# Patient Record
Sex: FEMALE | Race: BLACK OR AFRICAN AMERICAN | NOT HISPANIC OR LATINO | Employment: FULL TIME | URBAN - METROPOLITAN AREA
[De-identification: names, ages, dates, MRNs, and addresses within clinical notes are randomized per-mention and may not be internally consistent; named-entity substitution may affect disease eponyms.]

---

## 2017-01-06 ENCOUNTER — OFFICE VISIT (OUTPATIENT)
Dept: OBSTETRICS AND GYNECOLOGY | Facility: CLINIC | Age: 42
End: 2017-01-06
Payer: COMMERCIAL

## 2017-01-06 VITALS
SYSTOLIC BLOOD PRESSURE: 142 MMHG | DIASTOLIC BLOOD PRESSURE: 90 MMHG | BODY MASS INDEX: 36.67 KG/M2 | WEIGHT: 228.19 LBS | HEIGHT: 66 IN

## 2017-01-06 DIAGNOSIS — D50.0 IRON DEFICIENCY ANEMIA DUE TO CHRONIC BLOOD LOSS: ICD-10-CM

## 2017-01-06 DIAGNOSIS — Z30.09 STERILIZATION CONSULT: ICD-10-CM

## 2017-01-06 DIAGNOSIS — N92.0 MENORRHAGIA WITH REGULAR CYCLE: Primary | ICD-10-CM

## 2017-01-06 PROCEDURE — 99999 PR PBB SHADOW E&M-EST. PATIENT-LVL III: CPT | Mod: PBBFAC,,, | Performed by: OBSTETRICS & GYNECOLOGY

## 2017-01-06 PROCEDURE — 3077F SYST BP >= 140 MM HG: CPT | Mod: S$GLB,,, | Performed by: OBSTETRICS & GYNECOLOGY

## 2017-01-06 PROCEDURE — 3080F DIAST BP >= 90 MM HG: CPT | Mod: S$GLB,,, | Performed by: OBSTETRICS & GYNECOLOGY

## 2017-01-06 PROCEDURE — 88305 TISSUE EXAM BY PATHOLOGIST: CPT | Mod: 26,,, | Performed by: PATHOLOGY

## 2017-01-06 PROCEDURE — 1159F MED LIST DOCD IN RCRD: CPT | Mod: S$GLB,,, | Performed by: OBSTETRICS & GYNECOLOGY

## 2017-01-06 PROCEDURE — 88305 TISSUE EXAM BY PATHOLOGIST: CPT | Performed by: PATHOLOGY

## 2017-01-06 PROCEDURE — 99213 OFFICE O/P EST LOW 20 MIN: CPT | Mod: 25,S$GLB,, | Performed by: OBSTETRICS & GYNECOLOGY

## 2017-01-06 PROCEDURE — 58100 BIOPSY OF UTERUS LINING: CPT | Mod: S$GLB,,, | Performed by: OBSTETRICS & GYNECOLOGY

## 2017-01-06 NOTE — MR AVS SNAPSHOT
[facility]  [address]  [place] [address]  Phone: [phone]  Fax: [phone]                  Adeel Gordon   2017 2:30 PM   Office Visit    Description:  Female : 1975   Provider:  Gloria Martínez MD   Department:  [facility]           Reason for Visit     Sterilization           Diagnoses this Visit        Comments    Menorrhagia with regular cycle    -  Primary     Iron deficiency anemia due to chronic blood loss                To Do List           Future Appointments        Provider Department Dept Phone    2017 2:00 PM ONLH US1 [facility] [phone]      Goals (5 Years of Data)     None      Follow-Up and Disposition     Release results to MyChart [facility]     [facility] Nurse Care Line -  Assistance  Registered nurses in the [facility] provide clinical advisement, health education, appointment booking, and other advisory services.  Call for this free service at [phone].             Medications           Message regarding Medications     Verify the changes and/or additions to your medication regime listed below are the same as discussed with your clinician today.  If any of these changes or additions are incorrect, please notify your healthcare provider.             Verify that the below list of medications is an accurate representation of the medications you are currently taking.  If none reported, the list may be blank. If incorrect, please contact your healthcare provider. Carry this list with you in case of emergency.           Current Medications     amlodipine-benazepril 5-10 mg (LOTREL) 5-10 mg per capsule Take 1 capsule by mouth once daily.    ferrous sulfate 325 mg (65 mg iron) Tab tablet Take 1 tablet (325 mg total) by mouth 2 (two) times daily.           Clinical Reference Information           Vital Signs - Last Recorded  Most recent update: 2017  2:31 PM by  "Aleksey Howell MA    BP Ht Wt LMP BMI    (!) 142/90 5' 6" (1.676 m) 103.5 kg (228 lb 2.8 oz) 01/02/2017 (Exact Date) 36.83 kg/m2      Blood Pressure          Most Recent Value    BP  (!)  142/90      Allergies as of 1/6/2017     No Known Allergies      Immunizations Administered on Date of Encounter - 1/6/2017     None      Orders Placed During Today's Visit      Normal Orders This Visit    Tissue Specimen To Pathology, Obstetrics/Gynecology     Future Labs/Procedures Expected by Expires    US Pelvis Complete Non OB  1/6/2017 1/6/2018      Smoking Cessation     If you would like to quit smoking:   You may be eligible for free services if you are a [place] resident and started smoking cigarettes before September 1, 1988.  Call the Smoking Cessation Trust (SCT) toll free at (684) 313-9892 or (060) [phone].   Call 0-523-QUIT-NOW if you do not meet the above criteria.            "

## 2017-01-06 NOTE — PROGRESS NOTES
HPI:  41 y.o. female  presents today with complaint of longstanding menorrhagia.  She has regular menses each month that are very heavy, with clots and flooding.  She has to change protection every 2-3 hours on heavy days.  She also has anemia, and feels weak and run down.  No pain.  She also desires sterilization for contraception and is sure she does not desire any further pregnancies.  No other complaints.  Recently had annual exam with Lorie Olvera.      REVIEW OF SYSTEMS:  GENERAL:  No fever, chills, or weight loss  ABDOMEN:  Normal appetite, no weight loss or abdominal pain  URINARY:  No flank pain, dysuria, or hematuria  REPRODUCTIVE:  No abnormal vaginal discharge  BREASTS:  No tenderness, masses, or nipple discharge noted of breasts    PHYSICAL EXAM:    APPEARANCE:  Well nourished, well developed, in no acute distress  ABDOMEN:  Soft, no tenderness or masses, no distension noted  PELVIC:  VULVA:  No lesions.  Normal female genitalia  URETHRAL MEATUS:  Normal size and location.  No lesions.  No prolapse  URETHRA:  No masses, tenderness, prolapse, or scarring  VAGINA:  No lesions or discharge.  No significant cystocoele or rectocoele  CERVIX:  No lesions.  Normal diameter, no cervical motion tenderness.  UTERUS:  10 week size, regular shape, mobile, non-tender, normal position, good support  ADNEXA:  No masses or tenderness  ANUS AND PERINEUM:  No lesions.  No external hemorrhoids      PROCEDURE NOTE:  ENDOMETRIAL BIOPSPY    PRE ENDOMETRIAL BIOPSY COUNSELING:  The patient was informed of the risk of bleeding, infection, uterine perforation and pain and that the test will rule-out endometrial cancer with accuracy greater than 95%. She was counseled on the alternatives to endometrial biopsy and agrees to proceed.    TIME OUT PERFORMED.  The cervix was visualized with a speculum.  A single tooth tenaculum was placed on the anterior lip of the cervix prior to the biopsy.  A sterile endometrial pipelle was  passed without difficulty to a depth of 8 cm.  Moderate amount of endometrial tissue was obtained.    The specimen was placed in formalyn and sent to Pathology for histology evaluation. The patient tolerated the procedure well    POST ENDOMETRIAL BIOPSY COUNSELING:  Manage post biopsy cramping with NSAIDs or Tylenol.  Expect spotting or light bleeding for a few days.  Report bleeding heavier than a period, fever > 101.0 F, worsening pain or a foul smelling vaginal discharge.    Counseling lasted approximately 15 minutes and all her questions were answered    ASSESSMENT:  1. Menorrhagia with regular cycle  US Pelvis Complete Non OB    Tissue Specimen To Pathology, Obstetrics/Gynecology   2. Iron deficiency anemia due to chronic blood loss           PLAN:  Patient counseled regarding treatment options for her situation.  She desires to proceed with Novasure ablation and Lap BTL.  Will proceed with scheduling this, as long as uterine size on ultrasound is within required parameters.    Discussed with the patient and all questioned fully answered. She will call me if any problems arise.    The patient indicates understanding of these issues and agrees with the plan.

## 2017-01-11 ENCOUNTER — TELEPHONE (OUTPATIENT)
Dept: RADIOLOGY | Facility: HOSPITAL | Age: 42
End: 2017-01-11

## 2017-01-12 ENCOUNTER — HOSPITAL ENCOUNTER (OUTPATIENT)
Dept: RADIOLOGY | Facility: HOSPITAL | Age: 42
Discharge: HOME | End: 2017-01-12
Attending: OBSTETRICS & GYNECOLOGY
Payer: COMMERCIAL

## 2017-01-12 DIAGNOSIS — N92.0 MENORRHAGIA WITH REGULAR CYCLE: ICD-10-CM

## 2017-01-12 PROCEDURE — 76856 US EXAM PELVIC COMPLETE: CPT | Mod: 26,,, | Performed by: RADIOLOGY

## 2017-01-12 PROCEDURE — 76856 US EXAM PELVIC COMPLETE: CPT | Mod: TC

## 2017-01-13 ENCOUNTER — TELEPHONE (OUTPATIENT)
Dept: OBSTETRICS AND GYNECOLOGY | Facility: CLINIC | Age: 42
End: 2017-01-13

## 2017-01-13 NOTE — TELEPHONE ENCOUNTER
Please notify patient that her EMB was negative and pelvic ultrasound showed small fibroids.  I would recommend that she proceed with Novasure ablation and Lap BTL, as we discussed.  Please schedule.

## 2017-01-18 NOTE — TELEPHONE ENCOUNTER
Patient notified of negative EMB and US results.  Patient states she will call back when she is ready to schedule her Novasure Ablation and Lap BTL.  She is waiting for a day off from work to be scheduled.

## 2017-01-25 ENCOUNTER — TELEPHONE (OUTPATIENT)
Dept: OBSTETRICS AND GYNECOLOGY | Facility: CLINIC | Age: 42
End: 2017-01-25

## 2017-01-25 DIAGNOSIS — N92.0 MENORRHAGIA WITH REGULAR CYCLE: Primary | ICD-10-CM

## 2017-01-25 DIAGNOSIS — Z30.2 ENCOUNTER FOR FEMALE STERILIZATION PROCEDURE: ICD-10-CM

## 2017-02-07 ENCOUNTER — TELEPHONE (OUTPATIENT)
Dept: OBSTETRICS AND GYNECOLOGY | Facility: CLINIC | Age: 42
End: 2017-02-07

## 2017-02-07 NOTE — TELEPHONE ENCOUNTER
Patient would like to talk to you, she is scheduled for her pre op on 02/23/2017 at 200pm,  and she would like a TL also.  tf

## 2017-02-07 NOTE — TELEPHONE ENCOUNTER
----- Message from Margaux Gee sent at 1/25/2017  8:19 AM CST -----  Contact: pt  Pt calling to speak to Angely in regards to scheduling surgery for tubal ligation....please adv/call pt back at [phone]///thx jw

## 2017-02-10 ENCOUNTER — CLINICAL SUPPORT (OUTPATIENT)
Dept: CARDIOLOGY | Facility: CLINIC | Age: 42
End: 2017-02-10
Payer: COMMERCIAL

## 2017-02-10 ENCOUNTER — HOSPITAL ENCOUNTER (OUTPATIENT)
Dept: PREADMISSION TESTING | Facility: HOSPITAL | Age: 42
Discharge: HOME | End: 2017-02-10
Attending: OBSTETRICS & GYNECOLOGY
Payer: COMMERCIAL

## 2017-02-10 ENCOUNTER — OFFICE VISIT (OUTPATIENT)
Dept: OBSTETRICS AND GYNECOLOGY | Facility: CLINIC | Age: 42
End: 2017-02-10
Payer: COMMERCIAL

## 2017-02-10 VITALS
DIASTOLIC BLOOD PRESSURE: 88 MMHG | WEIGHT: 221.56 LBS | BODY MASS INDEX: 35.61 KG/M2 | SYSTOLIC BLOOD PRESSURE: 130 MMHG | HEIGHT: 66 IN

## 2017-02-10 VITALS — BODY MASS INDEX: 35.36 KG/M2 | HEIGHT: 66 IN | WEIGHT: 220 LBS

## 2017-02-10 DIAGNOSIS — Z01.818 PREOP EXAMINATION: Primary | ICD-10-CM

## 2017-02-10 DIAGNOSIS — Z01.818 PRE-OP TESTING: ICD-10-CM

## 2017-02-10 DIAGNOSIS — Z01.818 PRE-OP TESTING: Primary | ICD-10-CM

## 2017-02-10 PROCEDURE — 93000 ELECTROCARDIOGRAM COMPLETE: CPT | Mod: S$GLB,,, | Performed by: NUCLEAR MEDICINE

## 2017-02-10 PROCEDURE — 99499 UNLISTED E&M SERVICE: CPT | Mod: S$GLB,,, | Performed by: OBSTETRICS & GYNECOLOGY

## 2017-02-10 PROCEDURE — 99999 PR PBB SHADOW E&M-EST. PATIENT-LVL II: CPT | Mod: PBBFAC,,, | Performed by: OBSTETRICS & GYNECOLOGY

## 2017-02-10 NOTE — PROGRESS NOTES
Patient presents today for preoperative visit.  She is scheduled for D&C, hysteroscopy, lap BTL, and Novasure ablation on 2/28/17.  Procedure was explained to the patient and procedure specific consent form reviewed with the patient and signed by her.  All questions were answered to the patient's satisfaction.  Patient seems to understand the procedure, as well as risks and benefits associated with it.  Will plan to proceed with surgery as planned.

## 2017-02-10 NOTE — DISCHARGE INSTRUCTIONS
Pre op instructions reviewed with patient per phone:    To confirm, Your surgeon has instructed you:  Surgery is scheduled 2/28/17 at 1:00pm.      Please report to [facility] 1st floor main lobby by 11:30am Pre admit nurse will call day prior to verify arrival time.      INSTRUCTIONS IMPORTANT!!!  ¨ Do not eat, drink, or smoke after 12 midnight. You may have water or apple juice until 3 hours prior to surgery. OK to brush teeth, no gum, candy or mints!    ¨ Take only these medicines with a small swallow of water-morning of surgery.  Lotrel    ____  Do not wear makeup, including mascara.  ____  No powder, lotions or creams to surgical area.  ____  Please remove all jewelry, including piercings and leave at home.  ____  No money or valuables needed. Please leave at home.  ____  Please bring identification and insurance information to hospital.  ____  If going home the same day, arrange for a ride home. You will not be able to   drive if Anesthesia was used.  ____  Children, under 12 years old, must remain in the waiting room with an adult.  They are not allowed in patient areas.  ____  Wear loose fitting clothing. Allow for dressings, bandages.  ____  Stop Aspirin, Ibuprofen, Motrin and Aleve at least 5-7 days before surgery, unless otherwise instructed by your doctor, or the nurse.   You MAY use Tylenol/acetaminophen until day of surgery.  ____  If you take diabetic medication, do not take am of surgery unless instructed by   Doctor.  ____ Stop taking any Fish Oil supplement or any Vitamins that contain Vitamin E at least 5 days prior to surgery.          Bathing Instructions-- The night before surgery and the morning prior to coming to the hospital:   -Do not shave the surgical area.   -Shower and wash your hair and body as usual with your regular soap and shampoo.   -Rinse your hair and body completely.   -Use one packet of hibiclens to wash the surgical site (using your hand) gently for 5  minutes.  Do not scrub you skin too hard.   -Do not use hibiclens on your head, face, or genitals.   -Do not wash with regular soap after you use the hibiclens.   -Rinse your body thoroughly.   -Dry with clean, soft towel.  Do not use lotion, cream, deodorant, or powders on   the surgical site.    Use antibacterial soap in place of hibiclens if your surgery is on the head, face or genitals.         Surgical Site Infection    Prevention of surgical site infections:     -Keep incisions clean and dry.   -Do not soak/submerge incisions in water until completely healed.   -Do not apply lotions, powders, creams, or deodorants to site.   -Always make sure hands are cleaned with antibacterial soap/ alcohol-based   prior to touching the surgical site.  (This includes doctors, nurses, staff, and yourself.)    Signs and symptoms:   -Redness and pain around the area where you had surgery   -Drainage of cloudy fluid from your surgical wound   -Fever over 100.4  I have read or had read and explained to me, and understand the above information.

## 2017-02-10 NOTE — IP AVS SNAPSHOT
[facility]  [id number] [place] [address] [address] [place] [address]           Patient Discharge Instructions    Our goal is to set you up for success. This packet includes information on your condition, medications, and your home care. It will help you to care for yourself so you don't get sicker.     Please ask your nurse if you have any questions.        There are many details to remember when preparing for your surgery. Here is what you will need to do, please ask your nurse if there are more specific instructions and if you have any questions:    1. 24 hours before procedure Do not smoke or drink alcoholic beverages 24 hours prior to your procedure    2. Eating before procedure Do not eat or drink anything 8 hours before your procedure - this includes gum, mints, and candy.     3. Day of procedure Please remove all jewelry for the procedure. If you wear contact lenses, dentures, hearing aids or glasses, bring a container to put them in during your surgery and give to a family member for safekeeping.  If your doctor has scheduled you for an overnight stay, bring a small overnight bag with any personal items that you need.    4. After procedure Make arrangements in advance for transportation home by a responsible adult. It is not safe to drive a vehicle during the 24 hours following surgery.     PLEASE NOTE: You may be contacted the day before your surgery to confirm your surgery date and arrival time. The Surgery schedule has many variables which may affect the time of your surgery case. Family members should be available if your surgery time changes.                [facility]  Unless otherwise directed by your provider, please contact [facility], our nurse care line that is available for 24/7 assistance.     [phone] (toll-free)    Registered nurses in the [facility] provide clinical advisement, health education, appointment booking, and other advisory services.                     ** Verify the list of medication(s) below is accurate and up to date. Carry this with you in case of emergency. If your medications have changed, please notify your healthcare provider.             Medication List      TAKE these medications        Additional Info                      amlodipine-benazepril 5-10 mg 5-10 mg per capsule   Commonly known as:  LOTREL   Quantity:  30 capsule   Refills:  6   Dose:  1 capsule    Instructions:  Take 1 capsule by mouth once daily.     Begin Date    AM    Noon    PM    Bedtime       ferrous sulfate 325 mg (65 mg iron) Tab tablet   Quantity:  60 tablet   Refills:  3   Dose:  325 mg    Instructions:  Take 1 tablet (325 mg total) by mouth 2 (two) times daily.     Begin Date    AM    Noon    PM    Bedtime                  Please bring to all follow up appointments:    1. A copy of your discharge instructions.  2. All medicines you are currently taking in their original bottles.  3. Identification and insurance card.    Please arrive 15 minutes ahead of scheduled appointment time.    Please call 24 hours in advance if you must reschedule your appointment and/or time.        Your Scheduled Appointments     Feb 10, 2017  3:20 PM CST   Non-Fasting Lab with LABORATORY, [address] [facility] ([address] [id number] [phone]            Mar 30, 2017  1:30 PM CDT   Post OP with Gloria Martínez MD   [facility] ([address] [id number] [phone]              Your Future Surgeries/Procedures     Feb 28, 2017   Surgery with Gloria Martínez MD   [facility])    [id number] [place] [id number] [phone]                  Discharge Instructions       Pre op instructions reviewed with patient per phone:    To confirm, Your surgeon has instructed you:  Surgery is scheduled 2/28/17 at  1:00pm.      Please report to [facility] 1st floor main lobby by 11:30am Pre admit nurse will call day prior to verify arrival time.      INSTRUCTIONS IMPORTANT!!!  ¨ Do not eat, drink, or smoke after 12 midnight. You may have water or apple juice until 3 hours prior to surgery. OK to brush teeth, no gum, candy or mints!    ¨ Take only these medicines with a small swallow of water-morning of surgery.  Lotrel    ____  Do not wear makeup, including mascara.  ____  No powder, lotions or creams to surgical area.  ____  Please remove all jewelry, including piercings and leave at home.  ____  No money or valuables needed. Please leave at home.  ____  Please bring identification and insurance information to hospital.  ____  If going home the same day, arrange for a ride home. You will not be able to   drive if Anesthesia was used.  ____  Children, under 12 years old, must remain in the waiting room with an adult.  They are not allowed in patient areas.  ____  Wear loose fitting clothing. Allow for dressings, bandages.  ____  Stop Aspirin, Ibuprofen, Motrin and Aleve at least 5-7 days before surgery, unless otherwise instructed by your doctor, or the nurse.   You MAY use Tylenol/acetaminophen until day of surgery.  ____  If you take diabetic medication, do not take am of surgery unless instructed by   Doctor.  ____ Stop taking any Fish Oil supplement or any Vitamins that contain Vitamin E at least 5 days prior to surgery.          Bathing Instructions-- The night before surgery and the morning prior to coming to the hospital:   -Do not shave the surgical area.   -Shower and wash your hair and body as usual with your regular soap and shampoo.   -Rinse your hair and body completely.   -Use one packet of hibiclens to wash the surgical site (using your hand) gently for 5 minutes.  Do not scrub you skin too hard.   -Do not use hibiclens on your head, face, or genitals.   -Do not wash with regular soap  "after you use the hibiclens.   -Rinse your body thoroughly.   -Dry with clean, soft towel.  Do not use lotion, cream, deodorant, or powders on   the surgical site.    Use antibacterial soap in place of hibiclens if your surgery is on the head, face or genitals.         Surgical Site Infection    Prevention of surgical site infections:     -Keep incisions clean and dry.   -Do not soak/submerge incisions in water until completely healed.   -Do not apply lotions, powders, creams, or deodorants to site.   -Always make sure hands are cleaned with antibacterial soap/ alcohol-based   prior to touching the surgical site.  (This includes doctors, nurses, staff, and yourself.)    Signs and symptoms:   -Redness and pain around the area where you had surgery   -Drainage of cloudy fluid from your surgical wound   -Fever over 100.4  I have read or had read and explained to me, and understand the above information.          Discharge References/Attachments     ABLATION, ENDOMETRIAL (ENGLISH)    TUBAL STERILIZATION PROCEDURE, YOUR LAPAROSCOPIC (ENGLISH)    ANESTHESIA: GENERAL ANESTHESIA (ENGLISH)    POSTSURGICAL DEEP BREATHING, DISCHARGE INSTRUCTIONS (ENGLISH)        Admission Information     Date & Time Provider Department CSN    2/10/2017  2:30 PM Gloria Martínez MD [facility] [id number]      Care Providers     Provider Role Specialty Primary office phone    Gloria Martínez MD Attending Provider Obstetrics [phone]      Your Vitals Were     Height Weight Last Period BMI       5' 6" (1.676 m) 99.8 kg (220 lb) 01/29/2017 (Exact Date) 35.51 kg/m2       Recent Lab Values     No lab values to display.      Allergies as of 2/10/2017     No Known Allergies      Advance Directives     An advance directive is a document which, in the event you are no longer able to make decisions for yourself, tells your healthcare team what kind of treatment you do or do not want to receive, or who you would " like to make those decisions for you.  If you do not currently have an advance directive, [facility] encourages you to create one.  For more information call:  [phone]), [phone] ([phone]),  or log on to [email or web address].        Smoking Cessation     If you would like to quit smoking:   You may be eligible for free services if you are a [place] resident and started smoking cigarettes before September 1, 1988.  Call the Smoking Cessation Trust (SCT) toll free at (518) 238-1023 or (024) 340-2739.   Call 8-592-QUIT-NOW if you do not meet the above criteria.            Language Assistance Services     ATTENTION: Language assistance services are available, free of charge. Please call [phone].      ATENCIÓN: Si habla jaqueline, tiene a toscano disposición servicios gratuitos de asistencia lingüística. Llame al [phone].     CHÚ Ý: N?u b?n nói Ti?ng Vi?t, có các d?ch v? h? tr? ngôn ng? mi?n phí dành cho b?n. G?i s? [phone].         [facility] complies with applicable Federal civil rights laws and does not discriminate on the basis of race, color, national origin, age, disability, or sex.

## 2017-02-21 ENCOUNTER — TELEPHONE (OUTPATIENT)
Dept: OBSTETRICS AND GYNECOLOGY | Facility: CLINIC | Age: 42
End: 2017-02-21

## 2017-02-21 NOTE — TELEPHONE ENCOUNTER
----- Message from Camille Rojas sent at 2/21/2017  1:17 PM CST -----  Contact: pt  Pt requests nurse to call her at [phone] (xaeu) regarding a letter for surgery for her employer.

## 2017-02-23 ENCOUNTER — PATIENT MESSAGE (OUTPATIENT)
Dept: OBSTETRICS AND GYNECOLOGY | Facility: CLINIC | Age: 42
End: 2017-02-23

## 2017-02-27 ENCOUNTER — TELEPHONE (OUTPATIENT)
Dept: OBSTETRICS AND GYNECOLOGY | Facility: CLINIC | Age: 42
End: 2017-02-27

## 2017-02-27 ENCOUNTER — ANESTHESIA EVENT (OUTPATIENT)
Dept: SURGERY | Facility: HOSPITAL | Age: 42
End: 2017-02-27

## 2017-02-27 NOTE — TELEPHONE ENCOUNTER
----- Message from Nithya Hanley sent at 2/27/2017  9:33 AM CST -----  Patient is scheduled for surgery tomorrow.  She said she left messages all last week and no one called her back.  She needs a letter for her employer stating that the surgery she is having is necessary or she will not be able to take off.  She would like for you to fax it to her at [phone].  Call her at [phone].                                                  stubbs

## 2017-02-27 NOTE — TELEPHONE ENCOUNTER
Spoke to patient and let her know that I had attempted to reach her several times last week leaving messages and that I sent a patient portal message as well.  Patient stated that she needs a letter sent to her employer stating that she is having surgery tomorrow and type of surgery, that it is medically necessary and that she will need time off for recouping.  I told patient this message would be forwarded to Dr. Martínez and that I would fax it today as soon as possible.

## 2017-02-28 ENCOUNTER — SURGERY (OUTPATIENT)
Age: 42
End: 2017-02-28

## 2017-02-28 ENCOUNTER — ANESTHESIA (OUTPATIENT)
Dept: SURGERY | Facility: HOSPITAL | Age: 42
End: 2017-02-28

## 2017-02-28 ENCOUNTER — HOSPITAL ENCOUNTER (OUTPATIENT)
Facility: HOSPITAL | Age: 42
Discharge: HOME | End: 2017-02-28
Attending: OBSTETRICS & GYNECOLOGY | Admitting: OBSTETRICS & GYNECOLOGY

## 2017-02-28 VITALS
WEIGHT: 220 LBS | SYSTOLIC BLOOD PRESSURE: 137 MMHG | RESPIRATION RATE: 23 BRPM | BODY MASS INDEX: 35.36 KG/M2 | HEART RATE: 83 BPM | HEIGHT: 66 IN | TEMPERATURE: 98 F | OXYGEN SATURATION: 96 % | DIASTOLIC BLOOD PRESSURE: 90 MMHG

## 2017-02-28 DIAGNOSIS — N92.0 MENORRHAGIA WITH REGULAR CYCLE: Primary | ICD-10-CM

## 2017-02-28 DIAGNOSIS — D50.0 IRON DEFICIENCY ANEMIA DUE TO CHRONIC BLOOD LOSS: ICD-10-CM

## 2017-02-28 DIAGNOSIS — N92.0 MENORRHAGIA: ICD-10-CM

## 2017-02-28 DIAGNOSIS — G89.18 POST-OP PAIN: ICD-10-CM

## 2017-02-28 LAB
B-HCG UR QL: NEGATIVE
CTP QC/QA: YES

## 2017-02-28 PROCEDURE — 37000009 HC ANESTHESIA EA ADD 15 MINS: Performed by: OBSTETRICS & GYNECOLOGY

## 2017-02-28 PROCEDURE — 27201423 OPTIME MED/SURG SUP & DEVICES STERILE SUPPLY: Performed by: OBSTETRICS & GYNECOLOGY

## 2017-02-28 PROCEDURE — 36000709 HC OR TIME LEV III EA ADD 15 MIN: Performed by: OBSTETRICS & GYNECOLOGY

## 2017-02-28 PROCEDURE — 58563 HYSTEROSCOPY ABLATION: CPT | Mod: ,,, | Performed by: OBSTETRICS & GYNECOLOGY

## 2017-02-28 PROCEDURE — 58671 LAPAROSCOPY TUBAL BLOCK: CPT | Mod: 51,,, | Performed by: OBSTETRICS & GYNECOLOGY

## 2017-02-28 PROCEDURE — 25000003 PHARM REV CODE 250: Performed by: NURSE ANESTHETIST, CERTIFIED REGISTERED

## 2017-02-28 PROCEDURE — 27800903 OPTIME MED/SURG SUP & DEVICES OTHER IMPLANTS: Performed by: OBSTETRICS & GYNECOLOGY

## 2017-02-28 PROCEDURE — 88305 TISSUE EXAM BY PATHOLOGIST: CPT | Mod: 26,,, | Performed by: PATHOLOGY

## 2017-02-28 PROCEDURE — 71000015 HC POSTOP RECOV 1ST HR: Performed by: OBSTETRICS & GYNECOLOGY

## 2017-02-28 PROCEDURE — 37000008 HC ANESTHESIA 1ST 15 MINUTES: Performed by: OBSTETRICS & GYNECOLOGY

## 2017-02-28 PROCEDURE — 63600175 PHARM REV CODE 636 W HCPCS: Performed by: OBSTETRICS & GYNECOLOGY

## 2017-02-28 PROCEDURE — 63600175 PHARM REV CODE 636 W HCPCS: Performed by: NURSE ANESTHETIST, CERTIFIED REGISTERED

## 2017-02-28 PROCEDURE — 36000708 HC OR TIME LEV III 1ST 15 MIN: Performed by: OBSTETRICS & GYNECOLOGY

## 2017-02-28 PROCEDURE — 81025 URINE PREGNANCY TEST: CPT | Performed by: OBSTETRICS & GYNECOLOGY

## 2017-02-28 PROCEDURE — 71000039 HC RECOVERY, EACH ADD'L HOUR: Performed by: OBSTETRICS & GYNECOLOGY

## 2017-02-28 PROCEDURE — 63600175 PHARM REV CODE 636 W HCPCS: Performed by: ANESTHESIOLOGY

## 2017-02-28 PROCEDURE — 88305 TISSUE EXAM BY PATHOLOGIST: CPT | Performed by: PATHOLOGY

## 2017-02-28 PROCEDURE — 71000033 HC RECOVERY, INTIAL HOUR: Performed by: OBSTETRICS & GYNECOLOGY

## 2017-02-28 DEVICE — RING FALOPIAN RING: Type: IMPLANTABLE DEVICE | Site: FALLOPIAN TUBE | Status: FUNCTIONAL

## 2017-02-28 RX ORDER — SODIUM CHLORIDE, SODIUM LACTATE, POTASSIUM CHLORIDE, CALCIUM CHLORIDE 600; 310; 30; 20 MG/100ML; MG/100ML; MG/100ML; MG/100ML
INJECTION, SOLUTION INTRAVENOUS CONTINUOUS
Status: DISCONTINUED | OUTPATIENT
Start: 2017-02-28 | End: 2017-02-28 | Stop reason: HOSPADM

## 2017-02-28 RX ORDER — ACETAMINOPHEN 10 MG/ML
1000 INJECTION, SOLUTION INTRAVENOUS ONCE
Status: DISCONTINUED | OUTPATIENT
Start: 2017-02-28 | End: 2017-02-28 | Stop reason: HOSPADM

## 2017-02-28 RX ORDER — PROPOFOL 10 MG/ML
VIAL (ML) INTRAVENOUS
Status: DISCONTINUED | OUTPATIENT
Start: 2017-02-28 | End: 2017-02-28

## 2017-02-28 RX ORDER — FENTANYL CITRATE 50 UG/ML
INJECTION, SOLUTION INTRAMUSCULAR; INTRAVENOUS
Status: DISCONTINUED | OUTPATIENT
Start: 2017-02-28 | End: 2017-02-28

## 2017-02-28 RX ORDER — ROCURONIUM BROMIDE 10 MG/ML
INJECTION, SOLUTION INTRAVENOUS
Status: DISCONTINUED | OUTPATIENT
Start: 2017-02-28 | End: 2017-02-28

## 2017-02-28 RX ORDER — MIDAZOLAM HYDROCHLORIDE 1 MG/ML
INJECTION, SOLUTION INTRAMUSCULAR; INTRAVENOUS
Status: DISCONTINUED | OUTPATIENT
Start: 2017-02-28 | End: 2017-02-28

## 2017-02-28 RX ORDER — IBUPROFEN 800 MG/1
800 TABLET ORAL EVERY 6 HOURS PRN
Qty: 30 TABLET | Refills: 1 | Status: SHIPPED | OUTPATIENT
Start: 2017-02-28 | End: 2018-02-28

## 2017-02-28 RX ORDER — ONDANSETRON 2 MG/ML
4 INJECTION INTRAMUSCULAR; INTRAVENOUS DAILY PRN
Status: DISCONTINUED | OUTPATIENT
Start: 2017-02-28 | End: 2017-02-28 | Stop reason: HOSPADM

## 2017-02-28 RX ORDER — SODIUM CHLORIDE 9 MG/ML
3 INJECTION, SOLUTION INTRAMUSCULAR; INTRAVENOUS; SUBCUTANEOUS EVERY 8 HOURS
Status: DISCONTINUED | OUTPATIENT
Start: 2017-02-28 | End: 2017-02-28 | Stop reason: HOSPADM

## 2017-02-28 RX ORDER — SODIUM CHLORIDE, SODIUM LACTATE, POTASSIUM CHLORIDE, CALCIUM CHLORIDE 600; 310; 30; 20 MG/100ML; MG/100ML; MG/100ML; MG/100ML
INJECTION, SOLUTION INTRAVENOUS CONTINUOUS PRN
Status: DISCONTINUED | OUTPATIENT
Start: 2017-02-28 | End: 2017-02-28

## 2017-02-28 RX ORDER — MORPHINE SULFATE 10 MG/ML
2 INJECTION INTRAMUSCULAR; INTRAVENOUS; SUBCUTANEOUS EVERY 5 MIN PRN
Status: DISCONTINUED | OUTPATIENT
Start: 2017-02-28 | End: 2017-02-28 | Stop reason: HOSPADM

## 2017-02-28 RX ORDER — HYDROCODONE BITARTRATE AND ACETAMINOPHEN 5; 325 MG/1; MG/1
1 TABLET ORAL EVERY 6 HOURS PRN
Qty: 30 TABLET | Refills: 0 | Status: SHIPPED | OUTPATIENT
Start: 2017-02-28 | End: 2017-03-10

## 2017-02-28 RX ORDER — ACETAMINOPHEN 10 MG/ML
1000 INJECTION, SOLUTION INTRAVENOUS
Status: COMPLETED | OUTPATIENT
Start: 2017-02-28 | End: 2017-02-28

## 2017-02-28 RX ORDER — HYDROMORPHONE HYDROCHLORIDE 2 MG/ML
0.2 INJECTION, SOLUTION INTRAMUSCULAR; INTRAVENOUS; SUBCUTANEOUS EVERY 5 MIN PRN
Status: DISCONTINUED | OUTPATIENT
Start: 2017-02-28 | End: 2017-02-28 | Stop reason: HOSPADM

## 2017-02-28 RX ORDER — LIDOCAINE HYDROCHLORIDE 10 MG/ML
INJECTION INFILTRATION; PERINEURAL
Status: DISCONTINUED | OUTPATIENT
Start: 2017-02-28 | End: 2017-02-28

## 2017-02-28 RX ORDER — SODIUM CHLORIDE 9 MG/ML
3 INJECTION, SOLUTION INTRAMUSCULAR; INTRAVENOUS; SUBCUTANEOUS
Status: DISCONTINUED | OUTPATIENT
Start: 2017-02-28 | End: 2017-02-28 | Stop reason: HOSPADM

## 2017-02-28 RX ADMIN — PROPOFOL 200 MG: 10 INJECTION, EMULSION INTRAVENOUS at 01:02

## 2017-02-28 RX ADMIN — MIDAZOLAM HYDROCHLORIDE 2 MG: 1 INJECTION, SOLUTION INTRAMUSCULAR; INTRAVENOUS at 01:02

## 2017-02-28 RX ADMIN — ACETAMINOPHEN 1000 MG: 10 INJECTION, SOLUTION INTRAVENOUS at 12:02

## 2017-02-28 RX ADMIN — LIDOCAINE HYDROCHLORIDE 80 MG: 10 INJECTION, SOLUTION INFILTRATION; PERINEURAL at 01:02

## 2017-02-28 RX ADMIN — MORPHINE SULFATE 2 MG: 10 INJECTION, SOLUTION INTRAMUSCULAR; INTRAVENOUS at 03:02

## 2017-02-28 RX ADMIN — FENTANYL CITRATE 100 MCG: 50 INJECTION, SOLUTION INTRAMUSCULAR; INTRAVENOUS at 01:02

## 2017-02-28 RX ADMIN — ROCURONIUM BROMIDE 50 MG: 10 INJECTION, SOLUTION INTRAVENOUS at 01:02

## 2017-02-28 RX ADMIN — SODIUM CHLORIDE, SODIUM LACTATE, POTASSIUM CHLORIDE, AND CALCIUM CHLORIDE: 600; 310; 30; 20 INJECTION, SOLUTION INTRAVENOUS at 01:02

## 2017-02-28 NOTE — H&P
42 y.o. female  with longstanding menorrhagia and anemia.  Patient also desires BTL for permanent sterilization.  Ultrasound shows 10 cm uterus with 2 small fibroids.      Patient Active Problem List   Diagnosis    Hypertension    Obesity, Class II, BMI 35-39.9, with comorbidity    Menorrhagia with regular cycle    Iron deficiency anemia due to chronic blood loss       Review of patient's allergies indicates:  No Known Allergies    No current facility-administered medications for this encounter.      Current Outpatient Prescriptions   Medication Sig Dispense Refill    amlodipine-benazepril 5-10 mg (LOTREL) 5-10 mg per capsule Take 1 capsule by mouth once daily. 30 capsule 6    ferrous sulfate 325 mg (65 mg iron) Tab tablet Take 1 tablet (325 mg total) by mouth 2 (two) times daily. 60 tablet 3       Past Medical History:   Diagnosis Date    Depression     not taking meds     Hypertension     Obesity     Tobacco use disorder        Past Surgical History:   Procedure Laterality Date    VAGINAL DELIVERY      x 3       Family History   Problem Relation Age of Onset    Kidney disease Son     Cancer Father      THROAT    Hypertension Father        OB History    Para Term  AB SAB TAB Ectopic Multiple Living   3 3 3       3      # Outcome Date GA Lbr Brett/2nd Weight Sex Delivery Anes PTL Lv   3 Term      Vag-Spont   Y   2 Term      Vag-Spont   Y   1 Term      Vag-Spont   Y              Review of Systems:  Constitutional:  Negative for fatigue and unexpected weight change  Breasts:  Negative for masses, tenderness, or nipple discharge  Gastrointestinal:  Negative for nausea, abdominal pain, and abdominal distension  Genitourinary:  Negative for dysuria, frequency, or urgency  Gyn:  Negative for abnormal pelvic pain, or vaginal discharge    Physical Exam:    LMP 2017 (Exact Date)    General - no acute distress  HEENT - WNL  Lungs - normal respiratory effort  Heart - RRR  Abdomen - soft,  nontender and nondistended  Ext - nontender  Pelvic - deferred    ASSESSMENT:  Menorrhagia, anemia, desires BTL      PLAN:  Proceed with lap BTL, D&C, hysteroscopy, and Novasure ablation as planned.    The patient indicates understanding of these issues and agrees with the plan.

## 2017-02-28 NOTE — ANESTHESIA POSTPROCEDURE EVALUATION
"Anesthesia Post Evaluation    Patient: Adeel Gordon    Procedure(s) Performed: Procedure(s) (LRB):  ABLATION ENDOMETRIAL THERMAL - NOVASURE (N/A)  [id number] (Bilateral)    Final Anesthesia Type: general  Patient location during evaluation: PACU  Patient participation: Yes- Able to Participate  Level of consciousness: awake and alert  Post-procedure vital signs: reviewed and stable  Pain management: adequate  Airway patency: patent  PONV status at discharge: No PONV  Anesthetic complications: no      Cardiovascular status: blood pressure returned to baseline  Respiratory status: unassisted  Hydration status: euvolemic  Follow-up not needed.        Visit Vitals    BP (!) 143/95    Pulse 89    Temp 36.7 °C (98.1 °F) (Temporal)    Resp 14    Ht 5' 6" (1.676 m)    Wt 99.8 kg (220 lb)    LMP 01/29/2017 (Exact Date)    SpO2 98%    Breastfeeding No    BMI 35.51 kg/m2       Pain/Stefan Score: Pain Assessment Performed: Yes (2/28/2017 12:24 PM)  Presence of Pain: complains of pain/discomfort (2/28/2017 12:24 PM)  Pain Rating Prior to Med Admin: 4 (2/28/2017 12:43 PM)      "

## 2017-02-28 NOTE — TRANSFER OF CARE
"Anesthesia Transfer of Care Note    Patient: Adeel Gordon    Procedure(s) Performed: Procedure(s) (LRB):  ABLATION ENDOMETRIAL THERMAL - NOVASURE (N/A)  [id number] (Bilateral)    Patient location: PACU    Anesthesia Type: general    Transport from OR: Transported from OR on room air with adequate spontaneous ventilation    Post pain: adequate analgesia    Post assessment: no apparent anesthetic complications    Post vital signs: stable    Level of consciousness: awake    Nausea/Vomiting: no nausea/vomiting    Complications: none          Last vitals:   Visit Vitals    BP (!) 141/100 (BP Location: Right arm, Patient Position: Lying, BP Method: Automatic)    Pulse 78    Temp 37.1 °C (98.7 °F) (Oral)    Resp 18    Ht 5' 6" (1.676 m)    Wt 99.8 kg (220 lb)    LMP 01/29/2017 (Exact Date)    SpO2 97%    Breastfeeding No    BMI 35.51 kg/m2     "

## 2017-02-28 NOTE — IP AVS SNAPSHOT
[address] [place] [address]           Patient Discharge Instructions     Our goal is to set you up for success. This packet includes information on your condition, medications, and your home care. It will help you to care for yourself so you don't get sicker and need to go back to the hospital.     Please ask your nurse if you have any questions.        There are many details to remember when preparing to leave the hospital. Here is what you will need to do:    1. Take your medicine. If you are prescribed medications, review your Medication List in the following pages. You may have new medications to  at the pharmacy and others that you'll need to stop taking. Review the instructions for how and when to take your medications. Talk with your doctor or nurses if you are unsure of what to do.     2. Go to your follow-up appointments. Specific follow-up information is listed in the following pages. Your may be contacted by a transition nurse or clinical provider about future appointments. Be sure we have all of the phone numbers to reach you, if needed. Please contact your provider's office if you are unable to make an appointment.     3. Watch for warning signs. Your doctor or nurse will give you detailed warning signs to watch for and when to call for assistance. These instructions may also include educational information about your condition. If you experience any of warning signs to your health, call your doctor.               ** Verify the list of medication(s) below is accurate and up to date. Carry this with you in case of emergency. If your medications have changed, please notify your healthcare provider.             Medication List      START taking these medications        Additional Info                      hydrocodone-acetaminophen 5-325mg 5-325 mg per tablet   Commonly known as:  NORCO   Quantity:  30 tablet   Refills:  0   Dose:  1 tablet     Instructions:  Take 1 tablet by mouth every 6 (six) hours as needed for Pain.     Begin Date    AM    Noon    PM    Bedtime       ibuprofen 800 MG tablet   Commonly known as:  ADVIL,MOTRIN   Quantity:  30 tablet   Refills:  1   Dose:  800 mg    Instructions:  Take 1 tablet (800 mg total) by mouth every 6 (six) hours as needed for Pain.     Begin Date    AM    Noon    PM    Bedtime         CONTINUE taking these medications        Additional Info                      amlodipine-benazepril 5-10 mg 5-10 mg per capsule   Commonly known as:  LOTREL   Quantity:  30 capsule   Refills:  6   Dose:  1 capsule    Instructions:  Take 1 capsule by mouth once daily.     Begin Date    AM    Noon    PM    Bedtime       ferrous sulfate 325 mg (65 mg iron) Tab tablet   Quantity:  60 tablet   Refills:  3   Dose:  325 mg    Instructions:  Take 1 tablet (325 mg total) by mouth 2 (two) times daily.     Begin Date    AM    Noon    PM    Bedtime            Where to Get Your Medications      These medications were sent to [facility] [address] [place] [address] [address] [facility]  [address] [facility] [address]     Phone:  [phone]     hydrocodone-acetaminophen 5-325mg 5-325 mg per tablet    ibuprofen 800 MG tablet                  Please bring to all follow up appointments:    1. A copy of your discharge instructions.  2. All medicines you are currently taking in their original bottles.  3. Identification and insurance card.    Please arrive 15 minutes ahead of scheduled appointment time.    Please call 24 hours in advance if you must reschedule your appointment and/or time.        Your Scheduled Appointments     Mar 30, 2017  1:30 PM CDT   Post OP with Gloria Martínez MD   [facility]    [address] [facility] [address] [phone]              Follow-up Information     Follow up with Gloria Martínez MD In 4 weeks.    Specialties:  Obstetrics, Obstetrics and Gynecology     Why:  Postop check    Contact information:    [address] [place] [address]  [phone]          Discharge Instructions     Future Orders    Activity as tolerated     Call MD for:  difficulty breathing or increased cough     Call MD for:  persistent dizziness, light-headedness, or visual disturbances     Call MD for:  persistent nausea and vomiting or diarrhea     Call MD for:  redness, tenderness, or signs of infection (pain, swelling, redness, odor or green/yellow discharge around incision site)     Call MD for:  severe uncontrolled pain     Call MD for:  temperature >100.4     Diet general     Questions:    Total calories:      Fat restriction, if any:      Protein restriction, if any:      Na restriction, if any:      Fluid restriction:      Additional restrictions:      Other restrictions (specify):     Comments:    Pelvic rest x 2 weeks        Discharge Instructions         Discharge Instruction for Laparoscopic Fallopian Tube Ligation  Your doctor did a sterilization procedure to prevent any future pregnancies. This is a permanent form of birth control. Several different methods of surgical sterilization can be used to block the fallopian tubes. They all stop the egg from entering the womb (uterus) and sperm from traveling up to fertilize the egg. You had a laparoscopic procedure. The incisions on your abdomen may be tender or sore. You may also have pain in your upper back or shoulders. This is from the gas used to enlarge the abdomen to allow your doctor to see inside your pelvis and do the procedure. This pain usually goes away in a day or two.  Here's what you can do to speed your recovery after surgery.   Home care  · Take it easy. Stay quiet and rest for 2 days.  · Return to your normal activities after 48 hours. You may also return to work at that time.  · Eat a normal diet.  · If needed, take an over-the-counter pain reliever for pain.  · Don't lift anything heavier than 10 pounds  for 1 week after the procedure.  · Don't drive for at least 24 hours after the procedure and until pain is minimal without taking opioids if prescribed  · Don't have sex for 2 weeks after surgery.  Follow-up care  Make a follow-up appointment as directed by our staff.     When to call your healthcare provider  Call your healthcare provider right away if you have any of the following:  · Fever above 100.4°F (38°C) or higher, or as directed by your healthcare provider  · Chills  · Dizziness or fainting  · Abdominal pain and swelling that get worse  · Nausea and vomiting  · Signs of infection. These include drainage, pus, warmth, or redness at your incision site.  · Sudden chest pain or shortness of breath   Date Last Reviewed: 5/19/2015 © 2000-2016 [employer]. [address] [place] [address], [place] [address]. All rights reserved. This information is not intended as a substitute for professional medical care. Always follow your healthcare professional's instructions.        Endometrial Ablation  Endometrial ablation is an outpatient surgery that can reduce or stop heavy menstrual bleeding. Ablation destroys the lining of the uterus. This surgery is for women who do not want to have any more children and who have not yet entered menopause. It should not be used by women with endometrial hyperplasia or cancer of the uterus.  Treatment takes less than an hour, and you can go home later that day.  Preparing for surgery  · You may be given medicine by mouth or injection for a few weeks or months before your ablation. This thins the lining of the uterus and reduces bleeding.  · Your health care provider may recommend other procedures to check the inside of your uterus before the ablation is done.   · The day before surgery, you may be given medicine or a special substance (laminaria) may be put into your cervix (the opening to the uterus). This widens the opening.  · To help prevent problems with anesthesia, do not  eat or drink anything 10 hours before surgery.  Your surgery     Destroying the lining with heat, freezing, or electric current prevents the lining from growing back.      · Youll be given anesthesia so you stay comfortable and relaxed and feel no pain during surgery.  · Then, your uterus may be filled with fluid. This puts pressure on the lining to help reduce bleeding. It also allows your health care provider to see inside your uterus.  · Next your health care provider puts a small telescope-like instrument through the cervix. This scope may be connected to a video monitor. This helps your health care provider see and control the ablation process. At the end of the scope, a device using heat, freezing, or electric current destroys the uterine lining. Instead of the scope, your health care provider may use a device that both expands and destroys the uterine lining. After being inserted into your uterus, it also uses heat or other energy to destroy the lining. Your health care provider will choose the device thats best for you.  Your recovery  · You may have cramping or aching in your abdomen after surgery. Your health care provider can give you pain medicine.  · You may also have a bloody or watery discharge or bleeding for days or weeks. Use sanitary pads, not tampons.  · Dont have sexual intercourse or play active sports for 2 weeks after surgery.  · You can likely return to work in 2 days.  · Ask your health care provider about using contraception after an ablation.  · Your health care provider will see you in about 6 weeks to be sure youre healing well.  Call your health care provider if you have any of the following after surgery:  · Persistent or increased abdominal pain  · Shortness of breath  · Heavy vaginal bleeding  · Fever over 100.4°F (38°C) or chills  · Nausea  · Frequent urination for 24 hours   Date Last Reviewed: 5/10/2015  © 9818-0540 [employer]. [address] [place] [address]  [place] [address]. All rights reserved. This information is not intended as a substitute for professional medical care. Always follow your healthcare professional's instructions.        General Information:    1.  Do not drink alcoholic beverages including beer for 24 hours or as long as you are on pain medication..  2.  Do not drive a motor vehicle, operate machinery or power tools, or signs legal papers for 24 hours or as long as you are on pain medication.   3.  You may experience light-headedness, dizziness, and sleepiness following surgery. Please do not stay alone. A responsible adult should be with you for this 24 hour period.  4.  Go home and rest.    5. Progress slowly to a normal diet unless instructed.  Otherwise, begin with liquids such as soft drinks, then soup and crackers working up to solid foods. Drink plenty of nonalcoholic fluids.  6.  Certain anesthetics and pain medications produce nausea and vomiting in certain       individuals. If nausea becomes a problem at home, call you doctor.    7. A nurse will be calling you sometime after surgery. Do not be alarmed. This is our way of finding out how you are doing.    8. Several times every hour while you are awake, take 2-3 deep breaths and cough. If you had stomach surgery hold a pillow or rolled towel firmly against your stomach before you cough. This will help with any pain the cough might cause.  9. Several times every hour while you are awake, pump and flex your feet 5-6 times and do foot circles. This will help prevent blood clots.    10.Call your doctor for severe pain, bleeding, fever, or signs or symptoms of infection (pain, swelling, redness, foul odor, drainage).    11.You can contact your doctor anytime by [phone] for the [facility] (at [facility]) or [phone] for the [facility] on [address].   [email or web address] is another way to contact your doctor if you are an active participant online with [facility].        Acetaminophen; Hydrocodone tablets or capsules  What is this medicine?  ACETAMINOPHEN; HYDROCODONE (a set a MARIA L alana fen; melany droe KOE done) is a pain reliever. It is used to treat moderate to severe pain.  How should I use this medicine?  Take this medicine by mouth with a glass of water. Follow the directions on the prescription label. You can take it with or without food. If it upsets your stomach, take it with food. Do not take your medicine more often than directed.  A special MedGuide will be given to you by the pharmacist with each prescription and refill. Be sure to read this information carefully each time.  Talk to your pediatrician regarding the use of this medicine in children. Special care may be needed.  What side effects may I notice from receiving this medicine?  Side effects that you should report to your doctor or health care professional as soon as possible:  · allergic reactions like skin rash, itching or hives, swelling of the face, lips, or tongue  · breathing problems  · confusion  · redness, blistering, peeling or loosening of the skin, including inside the mouth  · signs and symptoms of low blood pressure like dizziness; feeling faint or lightheaded, falls; unusually weak or tired  · trouble passing urine or change in the amount of urine  · yellowing of the eyes or skin  Side effects that usually do not require medical attention (report to your doctor or health care professional if they continue or are bothersome):  · constipation  · dry mouth  · nausea, vomiting  · tiredness  What may interact with this medicine?  This medicine may interact with the following medications:  · alcohol  · antiviral medicines for HIV or AIDS  · atropine  · antihistamines for allergy, cough and cold  · certain antibiotics like erythromycin, clarithromycin  · certain medicines for anxiety or sleep  · certain medicines for bladder problems like oxybutynin, tolterodine  · certain medicines for  depression like amitriptyline, fluoxetine, sertraline  · certain medicines for fungal infections like ketoconazole and itraconazole  · certain medicines for Parkinson's disease like benztropine, trihexyphenidyl  · certain medicines for seizures like carbamazepine, phenobarbital, phenytoin, primidone  · certain medicines for stomach problems like dicyclomine, hyoscyamine  · certain medicines for travel sickness like scopolamine  · general anesthetics like halothane, isoflurane, methoxyflurane, propofol  · ipratropium  · local anesthetics like lidocaine, pramoxine, tetracaine  · MAOIs like Carbex, Eldepryl, Marplan, Nardil, and Parnate  · medicines that relax muscles for surgery  · other medicines with acetaminophen  · other narcotic medicines for pain or cough  · phenothiazines like chlorpromazine, mesoridazine, prochlorperazine, thioridazine  · rifampin  What if I miss a dose?  If you miss a dose, take it as soon as you can. If it is almost time for your next dose, take only that dose. Do not take double or extra doses.  Where should I keep my medicine?  Keep out of the reach of children. This medicine can be abused. Keep your medicine in a safe place to protect it from theft. Do not share this medicine with anyone. Selling or giving away this medicine is dangerous and against the law.  This medicine may cause accidental overdose and death if it taken by other adults, children, or pets. Mix any unused medicine with a substance like cat litter or coffee grounds. Then throw the medicine away in a sealed container like a sealed bag or a coffee can with a lid. Do not use the medicine after the expiration date.  Store at room temperature between 15 and 30 degrees C (59 and 86 degrees F).  What should I tell my health care provider before I take this medicine?  They need to know if you have any of these conditions:  · brain tumor  · Crohn's disease, inflammatory bowel disease, or ulcerative colitis  · drug abuse or  addiction  · head injury  · heart or circulation problems  · if you often drink alcohol  · kidney disease or problems going to the bathroom  · liver disease  · lung disease, asthma, or breathing problems  · an unusual or allergic reaction to acetaminophen, hydrocodone, other opioid analgesics, other medicines, foods, dyes, or preservatives  · pregnant or trying to get pregnant  · breast-feeding  What should I watch for while using this medicine?  Tell your doctor or health care professional if your pain does not go away, if it gets worse, or if you have new or a different type of pain. You may develop tolerance to the medicine. Tolerance means that you will need a higher dose of the medicine for pain relief. Tolerance is normal and is expected if you take the medicine for a long time.  Do not suddenly stop taking your medicine because you may develop a severe reaction. Your body becomes used to the medicine. This does NOT mean you are addicted. Addiction is a behavior related to getting and using a drug for a non-medical reason. If you have pain, you have a medical reason to take pain medicine. Your doctor will tell you how much medicine to take. If your doctor wants you to stop the medicine, the dose will be slowly lowered over time to avoid any side effects.  There are different types of narcotic medicines (opiates). If you take more than one type at the same time or if you are taking another medicine that also causes drowsiness, you may have more side effects. Give your health care provider a list of all medicines you use. Your doctor will tell you how much medicine to take. Do not take more medicine than directed. Call emergency for help if you have problems breathing or unusual sleepiness.  Do not take other medicines that contain acetaminophen with this medicine. Always read labels carefully. If you have questions, ask your doctor or pharmacist.  If you take too much acetaminophen get medical help right away.  Too much acetaminophen can be very dangerous and cause liver damage. Even if you do not have symptoms, it is important to get help right away.  You may get drowsy or dizzy. Do not drive, use machinery, or do anything that needs mental alertness until you know how this medicine affects you. Do not stand or sit up quickly, especially if you are an older patient. This reduces the risk of dizzy or fainting spells. Alcohol may interfere with the effect of this medicine. Avoid alcoholic drinks.  The medicine will cause constipation. Try to have a bowel movement at least every 2 to 3 days. If you do not have a bowel movement for 3 days, call your doctor or health care professional.  Your mouth may get dry. Chewing sugarless gum or sucking hard candy, and drinking plenty of water may help. Contact your doctor if the problem does not go away or is severe.  Date Last Reviewed:   NOTE:This sheet is a summary. It may not cover all possible information. If you have questions about this medicine, talk to your doctor, pharmacist, or health care provider. Copyright© 2016 [employer]            Primary Diagnosis     Your primary diagnosis was:  Heavy Periods      Admission Information     Date & Time Provider Department CSN    2/28/2017 11:01 AM Gloria Martínez MD [facility] [id number]      Care Providers     Provider Role Specialty Primary office phone    Gloria Martínez MD Attending Provider Obstetrics [phone]    Gloria Martínez MD Surgeon  Obstetrics [phone]      Your Vitals Were     BP                   143/99           Recent Lab Values     No lab values to display.      Pending Labs     Order Current Status    Specimen to Pathology - Surgery Collected (02/28/17 1430)      Allergies as of 2/28/2017     No Known Allergies      [facility]     [facility] Nurse Care Line - 24/7 Assistance  Unless otherwise directed by your provider, please contact [facility], our  nurse care line that is available for 24/7 assistance.     Registered nurses in the [facility] provide clinical advisement, health education, appointment booking, and other advisory services.  Call for this free service at [phone].        Advance Directives     An advance directive is a document which, in the event you are no longer able to make decisions for yourself, tells your healthcare team what kind of treatment you do or do not want to receive, or who you would like to make those decisions for you.  If you do not currently have an advance directive, [facility] encourages you to create one.  For more information call:  [phone]), [phone] ([phone]),  or log on to [email or web address].        Smoking Cessation     If you would like to quit smoking:   You may be eligible for free services if you are a [place] resident and started smoking cigarettes before September 1, 1988.  Call the Smoking Cessation Trust (SCT) toll free at (169) 861-6690 or (543) 622-2916.   Call 2-008-QUIT-NOW if you do not meet the above criteria.            Language Assistance Services     ATTENTION: Language assistance services are available, free of charge. Please call [phone].      ATENCIÓN: Si habla español, tiene a toscano disposición servicios gratuitos de asistencia lingüística. Llame al [phone].     [facility] Ý: N?u b?n nói Ti?ng Vi?t, có các d?ch v? h? tr? ngôn ng? mi?n phí dành cho b?n. G?i s? [phone].         [facility] complies with applicable Federal civil rights laws and does not discriminate on the basis of race, color, national origin, age, disability, or sex.

## 2017-02-28 NOTE — DISCHARGE INSTRUCTIONS
Discharge Instruction for Laparoscopic Fallopian Tube Ligation  Your doctor did a sterilization procedure to prevent any future pregnancies. This is a permanent form of birth control. Several different methods of surgical sterilization can be used to block the fallopian tubes. They all stop the egg from entering the womb (uterus) and sperm from traveling up to fertilize the egg. You had a laparoscopic procedure. The incisions on your abdomen may be tender or sore. You may also have pain in your upper back or shoulders. This is from the gas used to enlarge the abdomen to allow your doctor to see inside your pelvis and do the procedure. This pain usually goes away in a day or two.  Here's what you can do to speed your recovery after surgery.   Home care  · Take it easy. Stay quiet and rest for 2 days.  · Return to your normal activities after 48 hours. You may also return to work at that time.  · Eat a normal diet.  · If needed, take an over-the-counter pain reliever for pain.  · Don't lift anything heavier than 10 pounds for 1 week after the procedure.  · Don't drive for at least 24 hours after the procedure and until pain is minimal without taking opioids if prescribed  · Don't have sex for 2 weeks after surgery.  Follow-up care  Make a follow-up appointment as directed by our staff.     When to call your healthcare provider  Call your healthcare provider right away if you have any of the following:  · Fever above 100.4°F (38°C) or higher, or as directed by your healthcare provider  · Chills  · Dizziness or fainting  · Abdominal pain and swelling that get worse  · Nausea and vomiting  · Signs of infection. These include drainage, pus, warmth, or redness at your incision site.  · Sudden chest pain or shortness of breath   Date Last Reviewed: 5/19/2015  © 4231-1834 [employer]. [address] [place] [address], [place] [address]. All rights reserved. This information is not intended as a substitute for  professional medical care. Always follow your healthcare professional's instructions.        Endometrial Ablation  Endometrial ablation is an outpatient surgery that can reduce or stop heavy menstrual bleeding. Ablation destroys the lining of the uterus. This surgery is for women who do not want to have any more children and who have not yet entered menopause. It should not be used by women with endometrial hyperplasia or cancer of the uterus.  Treatment takes less than an hour, and you can go home later that day.  Preparing for surgery  · You may be given medicine by mouth or injection for a few weeks or months before your ablation. This thins the lining of the uterus and reduces bleeding.  · Your health care provider may recommend other procedures to check the inside of your uterus before the ablation is done.   · The day before surgery, you may be given medicine or a special substance (laminaria) may be put into your cervix (the opening to the uterus). This widens the opening.  · To help prevent problems with anesthesia, do not eat or drink anything 10 hours before surgery.  Your surgery     Destroying the lining with heat, freezing, or electric current prevents the lining from growing back.      · Youll be given anesthesia so you stay comfortable and relaxed and feel no pain during surgery.  · Then, your uterus may be filled with fluid. This puts pressure on the lining to help reduce bleeding. It also allows your health care provider to see inside your uterus.  · Next your health care provider puts a small telescope-like instrument through the cervix. This scope may be connected to a video monitor. This helps your health care provider see and control the ablation process. At the end of the scope, a device using heat, freezing, or electric current destroys the uterine lining. Instead of the scope, your health care provider may use a device that both expands and destroys the uterine lining. After being inserted  into your uterus, it also uses heat or other energy to destroy the lining. Your health care provider will choose the device thats best for you.  Your recovery  · You may have cramping or aching in your abdomen after surgery. Your health care provider can give you pain medicine.  · You may also have a bloody or watery discharge or bleeding for days or weeks. Use sanitary pads, not tampons.  · Dont have sexual intercourse or play active sports for 2 weeks after surgery.  · You can likely return to work in 2 days.  · Ask your health care provider about using contraception after an ablation.  · Your health care provider will see you in about 6 weeks to be sure youre healing well.  Call your health care provider if you have any of the following after surgery:  · Persistent or increased abdominal pain  · Shortness of breath  · Heavy vaginal bleeding  · Fever over 100.4°F (38°C) or chills  · Nausea  · Frequent urination for 24 hours   Date Last Reviewed: 5/10/2015  © 2780-8155 [employer]. [address] [place] [address]. All rights reserved. This information is not intended as a substitute for professional medical care. Always follow your healthcare professional's instructions.        General Information:    1.  Do not drink alcoholic beverages including beer for 24 hours or as long as you are on pain medication..  2.  Do not drive a motor vehicle, operate machinery or power tools, or signs legal papers for 24 hours or as long as you are on pain medication.   3.  You may experience light-headedness, dizziness, and sleepiness following surgery. Please do not stay alone. A responsible adult should be with you for this 24 hour period.  4.  Go home and rest.    5. Progress slowly to a normal diet unless instructed.  Otherwise, begin with liquids such as soft drinks, then soup and crackers working up to solid foods. Drink plenty of nonalcoholic fluids.  6.  Certain anesthetics and pain medications  produce nausea and vomiting in certain       individuals. If nausea becomes a problem at home, call you doctor.    7. A nurse will be calling you sometime after surgery. Do not be alarmed. This is our way of finding out how you are doing.    8. Several times every hour while you are awake, take 2-3 deep breaths and cough. If you had stomach surgery hold a pillow or rolled towel firmly against your stomach before you cough. This will help with any pain the cough might cause.  9. Several times every hour while you are awake, pump and flex your feet 5-6 times and do foot circles. This will help prevent blood clots.    10.Call your doctor for severe pain, bleeding, fever, or signs or symptoms of infection (pain, swelling, redness, foul odor, drainage).    11.You can contact your doctor anytime by [phone] for the [facility] (at [facility]) or [phone] for the [facility] on [facility].   [email or web address] is another way to contact your doctor if you are an active participant online with [employer].        Acetaminophen; Hydrocodone tablets or capsules  What is this medicine?  ACETAMINOPHEN; HYDROCODONE (a set a MARIA L alana fen; melany droe KOE done) is a pain reliever. It is used to treat moderate to severe pain.  How should I use this medicine?  Take this medicine by mouth with a glass of water. Follow the directions on the prescription label. You can take it with or without food. If it upsets your stomach, take it with food. Do not take your medicine more often than directed.  A special MedGuide will be given to you by the pharmacist with each prescription and refill. Be sure to read this information carefully each time.  Talk to your pediatrician regarding the use of this medicine in children. Special care may be needed.  What side effects may I notice from receiving this medicine?  Side effects that you should report to your doctor or health care professional as soon as possible:  · allergic  reactions like skin rash, itching or hives, swelling of the face, lips, or tongue  · breathing problems  · confusion  · redness, blistering, peeling or loosening of the skin, including inside the mouth  · signs and symptoms of low blood pressure like dizziness; feeling faint or lightheaded, falls; unusually weak or tired  · trouble passing urine or change in the amount of urine  · yellowing of the eyes or skin  Side effects that usually do not require medical attention (report to your doctor or health care professional if they continue or are bothersome):  · constipation  · dry mouth  · nausea, vomiting  · tiredness  What may interact with this medicine?  This medicine may interact with the following medications:  · alcohol  · antiviral medicines for HIV or AIDS  · atropine  · antihistamines for allergy, cough and cold  · certain antibiotics like erythromycin, clarithromycin  · certain medicines for anxiety or sleep  · certain medicines for bladder problems like oxybutynin, tolterodine  · certain medicines for depression like amitriptyline, fluoxetine, sertraline  · certain medicines for fungal infections like ketoconazole and itraconazole  · certain medicines for Parkinson's disease like benztropine, trihexyphenidyl  · certain medicines for seizures like carbamazepine, phenobarbital, phenytoin, primidone  · certain medicines for stomach problems like dicyclomine, hyoscyamine  · certain medicines for travel sickness like scopolamine  · general anesthetics like halothane, isoflurane, methoxyflurane, propofol  · ipratropium  · local anesthetics like lidocaine, pramoxine, tetracaine  · MAOIs like Carbex, Eldepryl, Marplan, Nardil, and Parnate  · medicines that relax muscles for surgery  · other medicines with acetaminophen  · other narcotic medicines for pain or cough  · phenothiazines like chlorpromazine, mesoridazine, prochlorperazine, thioridazine  · rifampin  What if I miss a dose?  If you miss a dose, take it as  soon as you can. If it is almost time for your next dose, take only that dose. Do not take double or extra doses.  Where should I keep my medicine?  Keep out of the reach of children. This medicine can be abused. Keep your medicine in a safe place to protect it from theft. Do not share this medicine with anyone. Selling or giving away this medicine is dangerous and against the law.  This medicine may cause accidental overdose and death if it taken by other adults, children, or pets. Mix any unused medicine with a substance like cat litter or coffee grounds. Then throw the medicine away in a sealed container like a sealed bag or a coffee can with a lid. Do not use the medicine after the expiration date.  Store at room temperature between 15 and 30 degrees C (59 and 86 degrees F).  What should I tell my health care provider before I take this medicine?  They need to know if you have any of these conditions:  · brain tumor  · Crohn's disease, inflammatory bowel disease, or ulcerative colitis  · drug abuse or addiction  · head injury  · heart or circulation problems  · if you often drink alcohol  · kidney disease or problems going to the bathroom  · liver disease  · lung disease, asthma, or breathing problems  · an unusual or allergic reaction to acetaminophen, hydrocodone, other opioid analgesics, other medicines, foods, dyes, or preservatives  · pregnant or trying to get pregnant  · breast-feeding  What should I watch for while using this medicine?  Tell your doctor or health care professional if your pain does not go away, if it gets worse, or if you have new or a different type of pain. You may develop tolerance to the medicine. Tolerance means that you will need a higher dose of the medicine for pain relief. Tolerance is normal and is expected if you take the medicine for a long time.  Do not suddenly stop taking your medicine because you may develop a severe reaction. Your body becomes used to the medicine. This  does NOT mean you are addicted. Addiction is a behavior related to getting and using a drug for a non-medical reason. If you have pain, you have a medical reason to take pain medicine. Your doctor will tell you how much medicine to take. If your doctor wants you to stop the medicine, the dose will be slowly lowered over time to avoid any side effects.  There are different types of narcotic medicines (opiates). If you take more than one type at the same time or if you are taking another medicine that also causes drowsiness, you may have more side effects. Give your health care provider a list of all medicines you use. Your doctor will tell you how much medicine to take. Do not take more medicine than directed. Call emergency for help if you have problems breathing or unusual sleepiness.  Do not take other medicines that contain acetaminophen with this medicine. Always read labels carefully. If you have questions, ask your doctor or pharmacist.  If you take too much acetaminophen get medical help right away. Too much acetaminophen can be very dangerous and cause liver damage. Even if you do not have symptoms, it is important to get help right away.  You may get drowsy or dizzy. Do not drive, use machinery, or do anything that needs mental alertness until you know how this medicine affects you. Do not stand or sit up quickly, especially if you are an older patient. This reduces the risk of dizzy or fainting spells. Alcohol may interfere with the effect of this medicine. Avoid alcoholic drinks.  The medicine will cause constipation. Try to have a bowel movement at least every 2 to 3 days. If you do not have a bowel movement for 3 days, call your doctor or health care professional.  Your mouth may get dry. Chewing sugarless gum or sucking hard candy, and drinking plenty of water may help. Contact your doctor if the problem does not go away or is severe.  Date Last Reviewed:   NOTE:This sheet is a summary. It may not  cover all possible information. If you have questions about this medicine, talk to your doctor, pharmacist, or health care provider. Copyright© 2016 [employer]

## 2017-02-28 NOTE — OP NOTE
OPERATIVE NOTE      PREOPERATIVE DIAGNOSIS:    1.  Desires Permanent Sterility  2.  Menorrhagia    POSTOPERATIVE DIAGNOSIS:  Same    OPERATIVE PROCEDURE:    1.  Laparoscopic Bilateral Tubal Ligation with Fallope Rings  2.  Dilation and Curettage  3.  Hysteroscopy  4.  Novasure endometrial ablation      SURGEON:  Gloria Martínez MD    ASSISTANT:  Angely Shultz CST    ANESTHESIA:  General Endotracheal    ESTIMATED BLOOD LOSS:  50 ml    FINDINGS:  Normal uterus, tubes, and ovaries.  Normal pelvis.  Normal appearing endometrial cavity.    COMPLICATIONS:  None    PROCEDURE IN DETAIL:    The procedure was explained to the patient and informed consent was obtained.  The patient was brought to the operating room where general anesthesia was administered.  A cid catheter was placed as well as a Zumi uterine manipulator, and she was prepped and draped in the usual sterile manner.  A time out was performed.   While tenting up on the abdominal skin with towel clips, a veress needle was placed through the umbilicus into the peritoneal cavity.  A saline drop test was noted to be normal.  The abdomen was insufflated with carbon dioxide to reach a pressure of 15 mm of mercury.  The veress needle was then removed.  A small intraumbilical stab incision was made with the scalpel.  A 5 mm trocar and sleeve were placed without difficulty.  Laparoscopic confirmation of location was achieved.  The upper abdomen was visualized and appeared normal.  The pelvis was visualized with the findings noted above.  An 8 mm suprapubic incision was made with the scalpel, and an 8 mm trocar and sleeve were placed under direct visualization.  The patient was placed in the trendelenburg position.  The fallopian tubes were identified and followed out to the fimbriated ends.  A fallope ring applicator was then applied to the left fallopian tube, approximately 3 cm from the cornua.  A good knuckle of tube was noted to be within the fallope ring, and good  blanching was noted.  The same procedure was done on the right fallopian tube.  Again good blanching of the tube was noted and a good knuckle of tube was noted to be within the ring.  The pelvis was inspected again with no abnormalities noted.  At this time the procedure was concluded.  The ports were removed without difficulty.  The skin incisions were closed with 4-0 vicryl suture in a subcuticular manner.  All instruments were removed without difficulty.  All counts were correct x 3.      At this time the patient was placed in the dorsal lithotomy position for the D&C/Novasure.  A weighted speculum was placed in the vagina and the anterior lip of the cervix was grasped with a single tooth tenaculum.  The uterus was sounded to 9 cm.  Cervical length was noted to be 2.5 cm.  The cervix was dilated to a number 8 Hegar dilator without difficulty.  A 5 mm diagnostic hysteroscope was then gently advanced to the uterine fundus with normal saline used as the distending medium.  The entire endometrial cavity was well visualized with no abnormalities noted.  The hysteroscope was then removed.  A sharp curettage was then performed with a moderate amount of tissue removed.  The tissue removed was sent to pathology.  At this time a Novasure ablation catheter was inserted into the uterus.  Uterine width was noted to be 4.3 cm.  The Novasure ablation cycle was then completed without difficulty.  Following the ablation, the endometrial cavity was visualized again with the hysteroscope.  The entire endometrial cavity appeared well cauterized.    At this time all instruments were removed, and the patient was awakened from anesthesia and brought to the recovery room in stable condition.  The patient tolerated the procedure well.  All counts were correct x 3.

## 2017-02-28 NOTE — ANESTHESIA PREPROCEDURE EVALUATION
02/28/2017  Adeel Gordon is a 42 y.o., female.    [facility] Anesthesia Evaluation    I have reviewed the Patient Summary Reports.    I have reviewed the Nursing Notes.   I have reviewed the Medications.     Review of Systems  Anesthesia Hx:  No previous Anesthesia  Neg history of prior surgery. Denies Family Hx of Anesthesia complications.   Denies Personal Hx of Anesthesia complications.   Social:  Smoker    Hematology/Oncology:     Oncology Normal     EENT/Dental:EENT/Dental Normal   Cardiovascular:   Hypertension ECG has been reviewed.    Pulmonary:  Pulmonary Normal    Hepatic/GI:  Hepatic/GI Normal    Musculoskeletal:  Musculoskeletal Normal    Neurological:  Neurology Normal    Endocrine:  Endocrine Normal    Dermatological:  Skin Normal    Psych:   Psychiatric History depression          Physical Exam  General:  Obesity    Airway/Jaw/Neck:  Airway Findings: Mouth Opening: Normal Tongue: Normal  General Airway Assessment: Adult  Mallampati: II  TM Distance: Normal, at least 6 cm          Chest/Lungs:  Chest/Lungs Findings: Normal Respiratory Rate     Heart/Vascular:  Heart Findings: Rate: Normal             Anesthesia Plan  Type of Anesthesia, risks & benefits discussed:  Anesthesia Type:  general  Patient's Preference:   Intra-op Monitoring Plan:   Intra-op Monitoring Plan Comments:   Post Op Pain Control Plan:   Post Op Pain Control Plan Comments:   Induction:   IV  Beta Blocker:  Patient is not currently on a Beta-Blocker (No further documentation required).       Informed Consent: Patient understands risks and agrees with Anesthesia plan.  Questions answered. Anesthesia consent signed with patient.  ASA Score: 2     Day of Surgery Review of History & Physical:    H&P update referred to the surgeon.         Ready For Surgery From Anesthesia Perspective.

## 2017-03-15 ENCOUNTER — TELEPHONE (OUTPATIENT)
Dept: OBSTETRICS AND GYNECOLOGY | Facility: CLINIC | Age: 42
End: 2017-03-15

## 2017-03-15 NOTE — TELEPHONE ENCOUNTER
----- Message from Margaux Esposito sent at 3/15/2017  9:06 AM CDT -----  Contact: Pt  Pt is requesting to speak to the nurse regarding some issues that she's having. Pt had surgery on 02/28. Pls call pt back at [phone].

## 2017-03-15 NOTE — TELEPHONE ENCOUNTER
Patient calls today asking if her stitches were dissolvable because she can still see at stitch.  Patient is advised and reassured that her stitches are dissolvable but it is not unusual or problematic to see a stitch sticking out of the incision for a little longer.  Patient verbalized understanding.

## 2017-03-23 ENCOUNTER — TELEPHONE (OUTPATIENT)
Dept: OBSTETRICS AND GYNECOLOGY | Facility: CLINIC | Age: 42
End: 2017-03-23

## 2017-03-23 NOTE — TELEPHONE ENCOUNTER
Spoke to patient and let her know since she is coming in for post op she should be following up with Dr. Martínez and not DAISY Navarro.  Patient stated the only week she is off in April is 4-7 and Dr. Martínez is out that week.  I will check with Dr. Martínez to see what she wants me to do.

## 2017-03-24 ENCOUNTER — TELEPHONE (OUTPATIENT)
Dept: OBSTETRICS AND GYNECOLOGY | Facility: CLINIC | Age: 42
End: 2017-03-24

## 2017-03-24 NOTE — TELEPHONE ENCOUNTER
----- Message from Paula Ervin sent at 3/24/2017 10:38 AM CDT -----  Contact: Patient   Patient request a call back at [phone], Regards to her appointment.    Thanks  Td

## 2017-03-24 NOTE — TELEPHONE ENCOUNTER
Patient stated that she has already received a call and talk to someone. I didn't see anything in her chart where she had spoken with someone within the office .

## 2017-06-06 ENCOUNTER — OFFICE VISIT (OUTPATIENT)
Dept: INTERNAL MEDICINE | Facility: CLINIC | Age: 42
End: 2017-06-06

## 2017-06-06 ENCOUNTER — HOSPITAL ENCOUNTER (OUTPATIENT)
Dept: RADIOLOGY | Facility: HOSPITAL | Age: 42
Discharge: HOME | End: 2017-06-06
Attending: FAMILY MEDICINE

## 2017-06-06 VITALS
WEIGHT: 196.63 LBS | BODY MASS INDEX: 31.6 KG/M2 | SYSTOLIC BLOOD PRESSURE: 150 MMHG | TEMPERATURE: 99 F | HEIGHT: 66 IN | DIASTOLIC BLOOD PRESSURE: 95 MMHG

## 2017-06-06 DIAGNOSIS — I10 BENIGN ESSENTIAL HYPERTENSION: ICD-10-CM

## 2017-06-06 DIAGNOSIS — K21.9 GASTRO-ESOPHAGEAL REFLUX DISEASE WITHOUT ESOPHAGITIS: Chronic | ICD-10-CM

## 2017-06-06 DIAGNOSIS — D50.0 IRON DEFICIENCY ANEMIA DUE TO CHRONIC BLOOD LOSS: ICD-10-CM

## 2017-06-06 DIAGNOSIS — R10.84 ABDOMINAL PAIN, GENERALIZED: Primary | ICD-10-CM

## 2017-06-06 DIAGNOSIS — R10.84 ABDOMINAL PAIN, GENERALIZED: ICD-10-CM

## 2017-06-06 PROCEDURE — 99213 OFFICE O/P EST LOW 20 MIN: CPT | Mod: PBBFAC,25,PO | Performed by: FAMILY MEDICINE

## 2017-06-06 PROCEDURE — 99214 OFFICE O/P EST MOD 30 MIN: CPT | Mod: S$PBB,,, | Performed by: FAMILY MEDICINE

## 2017-06-06 PROCEDURE — 74000 XR ABDOMEN AP 1 VIEW: CPT | Mod: TC,PO

## 2017-06-06 PROCEDURE — 74000 XR ABDOMEN AP 1 VIEW: CPT | Mod: 26,,, | Performed by: RADIOLOGY

## 2017-06-06 PROCEDURE — 99999 PR PBB SHADOW E&M-EST. PATIENT-LVL III: CPT | Mod: PBBFAC,,, | Performed by: FAMILY MEDICINE

## 2017-06-06 RX ORDER — AMLODIPINE AND BENAZEPRIL HYDROCHLORIDE 10; 20 MG/1; MG/1
1 CAPSULE ORAL DAILY
Qty: 90 CAPSULE | Refills: 3 | Status: SHIPPED | OUTPATIENT
Start: 2017-06-06 | End: 2018-06-14 | Stop reason: SDUPTHER

## 2017-06-06 RX ORDER — PANTOPRAZOLE SODIUM 40 MG/1
40 TABLET, DELAYED RELEASE ORAL DAILY
Qty: 30 TABLET | Refills: 0 | Status: SHIPPED | OUTPATIENT
Start: 2017-06-06 | End: 2017-09-20

## 2017-06-06 NOTE — ASSESSMENT & PLAN NOTE
This problem is NEW TO ME. Based on the report of the patient and information available to me at present, this condition does not require further work-up at this point. We will re-assess if the condition worsens or fails to improve as expected. Totally unrelated to her current complaints of abdominal pain, she also reports chronic indigestion characterized as burning and bloating sensation in the epigastric region with radiation upward. It is EXACERBATED by certain foods and recumbent positioning. She has not tried taking any antacids or medications for gastroesophageal reflux. She says that this problem has been going on for months.

## 2017-06-06 NOTE — ASSESSMENT & PLAN NOTE
This problem is NEW TO ME.  Based on the report of the patient and information available to me at present, this condition appears to be POORLY CONTROLLED. She has chronic hypertension, asymptomatic, without angina or angina equivalent symptoms. She reports that her home blood pressure readings are consistently greater than 140 systolic and consistently greater than 90 diastolic. She reports compliance with her amlodipine/benazepril 5/10 mg daily, and she tolerates the medication well without adverse effect.

## 2017-06-06 NOTE — ASSESSMENT & PLAN NOTE
This problem is NEW TO ME. Based on the report of the patient and information available to me at present, this condition appears to be COMPLICATED and REQUIRES FURTHER WORK-UP.

## 2017-06-06 NOTE — ASSESSMENT & PLAN NOTE
This problem is NEW TO ME.  Based on the report of the patient and information available to me at present, this condition appears to be STABLE/COMPENSATED. It appears that the source of her chronic blood loss was heavy frequent irregular menstrual cycles, but these have essentially ceased since her endometrial ablation.

## 2017-06-06 NOTE — ASSESSMENT & PLAN NOTE
This problem is NEW TO ME. Based on the report of the patient and information available to me at present, this condition REQUIRES FURTHER WORK-UP, EVALUATION, and TREATMENT.  QUALITY described as crampy. ONSET was over the last 1 to 2 days. ASSOCIATED SYMPTOMS include abdominal bloating and belching. LOCATION is generalized. SEVERITY described as MODERATE. TIMING described as intermittent. She cannot identify EXACERBATING or ALLEVIATING FACTORS. She denies possibility of pregnancy, having had bilateral to ligation and endometrial ablation performed February 28, 2017. She denies fever, vomiting, dark tarry stool, or blood in her stool. She reports history of chronic constipation, although she also says she typically has a bowel movement most every day. Last bowel movement was yesterday and it was somewhat loose but not watery. Her abdomen has MILD diffuse tenderness throughout, more so in the epigastric region and upper quadrants, but without focal tenderness and without guarding or rebound tenderness. Abdomen is MODERATELY distended. Bowel sounds are present. I discussed the differential diagnosis with her, and she agreed to evaluate this further with abdominal x-ray and treat it conservatively with bland diet, ensuring adequate fluid hydration, and return for reevaluation tomorrow. She understands that if this problem worsens or if she develops NEW PROBLEMS, she needs to seek prompt medical attention for reevaluation, either at urgent care or emergency department.

## 2017-06-06 NOTE — PATIENT INSTRUCTIONS
Abdominal Pain    Abdominal pain is pain in the stomach or belly area. Everyone has this pain from time to time. In many cases it goes away on its own. But abdominal pain can sometimes be due to a serious problem, such as appendicitis. So its important to know when to seek help.  Causes of abdominal pain  There are many possible causes of abdominal pain. Common causes in adults include:  · Constipation, diarrhea, or gas  · Stomach acid flowing back up into the esophagus (acid reflux or heartburn)  · Severe acid reflux, called GERD (gastroesophageal reflux disease)  · A sore in the lining of the stomach or small intestine (peptic ulcer)  · Inflammation of the gallbladder, liver, or pancreas  · Gallstones or kidney stones  · Appendicitis   · Intestinal blockage   · An internal organ pushing through a muscle or other tissue (hernia)  · Urinary tract infections  · In women, menstrual cramps, fibroids, or endometriosis  · Inflammation or infection of the intestines  Diagnosing the cause of abdominal pain  Your healthcare provider will do a physical exam help find the cause of your pain. If needed, tests will be ordered. Belly pain has many possible causes. So it can be hard to find the reason for your pain. Giving details about your pain can help. Tell your provider where and when you feel the pain, and what makes it better or worse. Also let your provider know if you have other symptoms such as:  · Fever  · Tiredness  · Upset stomach (nausea)  · Vomiting  · Changes in bathroom habits  Treating abdominal pain  Some causes of pain need emergency medical treatment right away. These include appendicitis or a bowel blockage. Other problems can be treated with rest, fluids, or medicines. Your healthcare provider can give you specific instructions for treatment or self-care based on what is causing your pain.  If you have vomiting or diarrhea, sip water or other clear fluids. When you are ready to eat solid foods again,  start with small amounts of easy-to-digest, low-fat foods. These include apple sauce, toast, or crackers.   When to seek medical care  Call 911 or go to the hospital right away if you:  · Cant pass stool and are vomiting  · Are vomiting blood or have bloody diarrhea or black, tarry diarrhea  · Have chest, neck, or shoulder pain  · Feel like you might pass out  · Have pain in your shoulder blades with nausea  · Have sudden, severe belly pain  · Have new, severe pain unlike any you have felt before  · Have a belly that is rigid, hard, and tender to touch  Call your healthcare provider if you have:  · Pain for more than 5 days  · Bloating for more than 2 days  · Diarrhea for more than 5 days  · A fever of 100.4°F (38.0°C) or higher, or as directed by your provider  · Pain that gets worse  · Weight loss for no reason  · Continued lack of appetite  · Blood in your stool  How to prevent abdominal pain  Here are some tips to help prevent abdominal pain:  · Eat smaller amounts of food at one time.  · Avoid greasy, fried, or other high-fat foods.  · Avoid foods that give you gas.  · Exercise regularly.  · Drink plenty of fluids.  To help prevent GERD symptoms:  · Quit smoking.  · Reduce alcohol and certain foods that increase stomach acid.  · Avoid aspirin and over-the-counter pain and fever medicines (NSAIDS or nonsteroidal anti-inflammatory drugs), if possible  · Lose extra weight.  · Finish eating at least 2 hours before you go to bed or lie down.  · Raise the head of your bed.  Date Last Reviewed: 7/1/2016  © 5074-6212 [employer]. [address] [place] [address], [place] [address]. All rights reserved. This information is not intended as a substitute for professional medical care. Always follow your healthcare professional's instructions.        Unknown Causes of Abdominal Pain (Female)    The exact cause of your abdominal (stomach) pain is not clear. This does not mean that this is something to worry about.  Everyone likes to know the exact cause of the problem, but sometimes with abdominal pain, there is no clear-cut cause, and this could be a good thing. The good news is that your symptoms can be treated, and you will feel better.   Your condition does not seem serious now; however, sometimes the signs of a serious problem may take more time to appear. For this reason, it is important for you to watch for any new symptoms, problems, or worsening of your condition.  Over the next few days, the abdominal pain may come and go, or be continuous. Other common symptoms can include nausea and vomiting. Sometimes it can be difficult to tell if you feel nauseous, you may just feel bad and not associate that feeling with nausea. Constipation, diarrhea, and a fever may go along with the pain.  The pain may continue even if treated correctly over the following days. Depending on how things go, sometimes the cause can become clear and may require further or different treatment. Additional evaluations, medications, or tests may also be needed.  Home care  Your healthcare provider may prescribe medicine for pain, symptoms, or an infection.  Follow the healthcare provider's instructions for taking these medicines.  General care  · Rest as much as you can until your next exam. No strenuous activities.  · Try to find positions that ease discomfort. A small pillow placed on the abdomen may help relieve pain.  · Something warm on your abdomen (such as a heating pad) may help, but be careful not to burn yourself.  Diet  · Do not force yourself to eat, especially if having cramps, vomiting, or diarrhea.  · Water is important so you do not get dehydrated. Soup may also be good. Sports drinks may also help, especially if they are not too acidic. Make sure you don't drink sugary drinks as this can make things worse. Take liquids in small amounts. Do not guzzle them.  · Caffeine sometimes makes the pain and cramping worse.  · Avoid dairy  products if you have vomiting or diarrhea.  · Don't eat large amounts at a time. Wait a few minutes between bites.  · Eat a diet low in fiber (called a low-residue diet). Foods allowed include refined breads, white rice, fruit and vegetable juices without pulp, tender meats. These foods will pass more easily through the intestine.  · Avoid whole-grain foods, whole fruits and vegetables, meats, seeds and nuts, fried or fatty foods, dairy, alcohol and spicy foods until your symptoms go away.  Follow-up care  Follow up with your healthcare provider, or as advised, if your pain does not begin to improve in the next 24 hours.  Call 911  Call 911 if any of these occur:  · Trouble breathing  · Confusion  · Fainting or loss of consciousness  · Rapid heart rate  · Seizure  When to seek medical advice  Call your healthcare provider right away if any of these occur:  · Pain gets worse or moves to the right lower abdomen  · New or worsening vomiting or diarrhea  · Swelling of the abdomen  · Unable to pass stool for more than 3 days  · Fever of 100.4ºF (38ºC) or higher, or as directed by your healthcare provider.  · Blood in vomit or bowel movements (dark red or black color)  · Jaundice (yellow color of eyes and skin)  · Weakness, dizziness  · Chest, arm, back, neck or jaw pain  · Unexpected vaginal bleeding or missed period  · Can't keep down liquids or water and are getting dehydrated  Date Last Reviewed: 12/30/2015  © 6282-1711 [employer]. [address] [place] [address], [place] [address]. All rights reserved. This information is not intended as a substitute for professional medical care. Always follow your healthcare professional's instructions.

## 2017-06-06 NOTE — PROGRESS NOTES
"NOTE: Documentation entered by me for this encounter was done in part using speech-recognition technology. Although I have made an effort to ensure accuracy, malapropisms may exist and should be interpreted in context.  -DAISY Leigh MD    Patient ID: Adeel Gordon is a 42 y.o. female.    CHIEF COMPLAINT   Hypertension and Abdominal Pain      HISTORY OF PRESENT ILLNESS:   NOTE: Additional description of problem(s) or condition(s) may be included in a separate paragraph labeled "NARRATIVE HISTORY OF PRESENT ILLNESS".  -DAISY Leigh MD    Abdominal pain, generalized  This problem is NEW TO ME. Based on the report of the patient and information available to me at present, this condition REQUIRES FURTHER WORK-UP, EVALUATION, and TREATMENT.  QUALITY described as crampy. ONSET was over the last 1 to 2 days. ASSOCIATED SYMPTOMS include abdominal bloating and belching. LOCATION is generalized. SEVERITY described as MODERATE. TIMING described as intermittent. She cannot identify EXACERBATING or ALLEVIATING FACTORS. She denies possibility of pregnancy, having had bilateral to ligation and endometrial ablation performed February 28, 2017. She denies fever, vomiting, dark tarry stool, or blood in her stool. She reports history of chronic constipation, although she also says she typically has a bowel movement most every day. Last bowel movement was yesterday and it was somewhat loose but not watery. Her abdomen has MILD diffuse tenderness throughout, more so in the epigastric region and upper quadrants, but without focal tenderness and without guarding or rebound tenderness. Abdomen is MODERATELY distended. Bowel sounds are present. I discussed the differential diagnosis with her, and she agreed to evaluate this further with abdominal x-ray and treat it conservatively with bland diet, ensuring adequate fluid hydration, and return for reevaluation tomorrow. She understands that if this problem worsens or if she " develops NEW PROBLEMS, she needs to seek prompt medical attention for reevaluation, either at urgent care or emergency department.     Benign essential hypertension  This problem is NEW TO ME.  Based on the report of the patient and information available to me at present, this condition appears to be POORLY CONTROLLED. She has chronic hypertension, asymptomatic, without angina or angina equivalent symptoms. She reports that her home blood pressure readings are consistently greater than 140 systolic and consistently greater than 90 diastolic. She reports compliance with her amlodipine/benazepril 5/10 mg daily, and she tolerates the medication well without adverse effect.     Gastro-esophageal reflux disease without esophagitis  This problem is NEW TO ME. Based on the report of the patient and information available to me at present, this condition does not require further work-up at this point. We will re-assess if the condition worsens or fails to improve as expected. Totally unrelated to her current complaints of abdominal pain, she also reports chronic indigestion characterized as burning and bloating sensation in the epigastric region with radiation upward. It is EXACERBATED by certain foods and recumbent positioning. She has not tried taking any antacids or medications for gastroesophageal reflux. She says that this problem has been going on for months.    Iron deficiency anemia due to chronic blood loss  This problem is NEW TO ME.  Based on the report of the patient and information available to me at present, this condition appears to be STABLE/COMPENSATED. It appears that the source of her chronic blood loss was heavy frequent irregular menstrual cycles, but these have essentially ceased since her endometrial ablation.      REVIEW OF SYSTEMS:   CONSTITUTIONAL: No fever reported.  CARDIOVASCULAR: No chest pain reported.  PULMONARY: No trouble breathing reported.  PSYCHIATRIC: No mood instability reported.  "  GENITOURINARY: No dysuria or hematuria reported.   GASTROINTESTINAL: No hematemesis or melena reported.     PHYSICAL EXAM:   CONSTITUTIONAL: Vital signs (BP, P, T, RR, et al) noted. No apparent distress. Does not appear acutely ill or septic. Appears adequately hydrated.  HEENT: External ENT grossly unremarkable. Hearing grossly intact. Oropharynx moist.  NECK: Neck supple without meningismus. Trachea midline.  PULM: Lungs clear. Breathing unlabored.  CV: Heart regular. No jugular venous distention. No carotid bruit.  GI: Her abdomen has MILD diffuse tenderness throughout, more so in the epigastric region and upper quadrants, but without focal tenderness and without guarding or rebound tenderness. Abdomen is MODERATELY distended. Bowel sounds are present. No appreciable organomegaly or intra-abdominal mass.  DERM: Skin warm and moist with normal turgor.   NEURO: Strength is reasonably symmetric without gross focal motor deficits or gross deficits of cranial nerve III-XII.  PSYCH: Alert and oriented x 3. Mood is grossly neutral. Affect appropriate. Judgment and insight not grossly compromised.  MSK: Grossly normal stance and gait.     ASSESSMENT:       1. Abdominal pain, generalized    2. Benign essential hypertension    3. Iron deficiency anemia due to chronic blood loss    4. Gastro-esophageal reflux disease without esophagitis           PLAN:   NOTE: Unless specified otherwise, the PLAN for a listed ASSESSMENT is to continue present treatment as prescribed. The planned follow-up and additional "Patient Instructions" may also be found in the After Visit Summary printed and provided to the patient.    Abdominal pain, generalized  -     X-Ray Abdomen AP 1 View; Future; Expected date: 06/06/2017    Benign essential hypertension  -     amlodipine-benazepril 10-20mg (LOTREL) 10-20 mg per capsule; Take 1 capsule by mouth once daily.  Dispense: 90 capsule; Refill: 3    Iron deficiency anemia due to chronic " blood loss    Gastro-esophageal reflux disease without esophagitis  -     pantoprazole (PROTONIX) 40 MG tablet; Take 1 tablet (40 mg total) by mouth once daily.  Dispense: 30 tablet; Refill: 0    RETURN FOR REEVALUATION: Tomorrow    Medications Discontinued During This Encounter   Medication Reason    amlodipine-benazepril 5-10 mg (LOTREL) 5-10 mg per capsule Dose adjustment

## 2017-06-07 ENCOUNTER — OFFICE VISIT (OUTPATIENT)
Dept: INTERNAL MEDICINE | Facility: CLINIC | Age: 42
End: 2017-06-07

## 2017-06-07 VITALS
DIASTOLIC BLOOD PRESSURE: 88 MMHG | TEMPERATURE: 98 F | OXYGEN SATURATION: 97 % | HEIGHT: 66 IN | BODY MASS INDEX: 34.58 KG/M2 | SYSTOLIC BLOOD PRESSURE: 124 MMHG | WEIGHT: 215.19 LBS | HEART RATE: 92 BPM

## 2017-06-07 DIAGNOSIS — K21.9 GASTRO-ESOPHAGEAL REFLUX DISEASE WITHOUT ESOPHAGITIS: Chronic | ICD-10-CM

## 2017-06-07 DIAGNOSIS — I10 BENIGN ESSENTIAL HYPERTENSION: ICD-10-CM

## 2017-06-07 DIAGNOSIS — R10.84 ABDOMINAL PAIN, GENERALIZED: Primary | ICD-10-CM

## 2017-06-07 PROCEDURE — 99999 PR PBB SHADOW E&M-EST. PATIENT-LVL III: CPT | Mod: PBBFAC,,, | Performed by: FAMILY MEDICINE

## 2017-06-07 PROCEDURE — 99213 OFFICE O/P EST LOW 20 MIN: CPT | Mod: PBBFAC,PO | Performed by: FAMILY MEDICINE

## 2017-06-07 PROCEDURE — 99213 OFFICE O/P EST LOW 20 MIN: CPT | Mod: S$PBB,,, | Performed by: FAMILY MEDICINE

## 2017-06-07 NOTE — PROGRESS NOTES
NOTE: Documentation entered by me for this encounter was done in part using speech-recognition technology. Although I have made an effort to ensure accuracy, malapropisms may exist and should be interpreted in context.  GELACIO Leigh MD    Patient ID: Adeel Gordon is a 42 y.o. female.    CHIEF COMPLAINT   Follow-up  blood pressure, etc.    CURRENT MEDICATION LIST REVIEWED AND UPDATED:     Current Outpatient Prescriptions:     amlodipine-benazepril 10-20mg (LOTREL) 10-20 mg per capsule, Take 1 capsule by mouth once daily., Disp: 90 capsule, Rfl: 3    ferrous sulfate 325 mg (65 mg iron) Tab tablet, Take 1 tablet (325 mg total) by mouth 2 (two) times daily., Disp: 60 tablet, Rfl: 3    ibuprofen (ADVIL,MOTRIN) 800 MG tablet, Take 1 tablet (800 mg total) by mouth every 6 (six) hours as needed for Pain., Disp: 30 tablet, Rfl: 1    pantoprazole (PROTONIX) 40 MG tablet, Take 1 tablet (40 mg total) by mouth once daily., Disp: 30 tablet, Rfl: 0    MEDICAL HISTORY REVIEWED AND UPDATED:   She  has a past medical history of Depression; Hypertension; Obesity; and Tobacco use disorder.    SURGICAL HISTORY REVIEWED AND UPDATED:   She  has a past surgical history that includes Vaginal delivery; Tubal ligation (02/28/2017); and Endometrial ablation (02/28/2017).    SOCIAL HISTORY REVIEWED AND UPDATED:   She  reports that she has been smoking.  She has a 10.00 pack-year smoking history. She has never used smokeless tobacco. She reports that she does not drink alcohol or use drugs.    HISTORY OF PRESENT ILLNESS:      The following condition(s) were addressed during this encounter. The listed order is one of convenience and does not necessarily reflect the chronology of the appointment, nor the relative importance of a condition. It is possible that additional DESCRIPTION/STATUS details about condition(s) may be found elsewhere in the documentation for today's encounter.    PROBLEM/CONDITION: She returns for  reevaluation of her previously reported g eneralized abdominal pain. She looks dramatically better today, and she says that she feels dramatically better today, at least 60% improved. She says that she is eating modest amounts of food, able to drink ample fluids without difficulty. She reports no nausea or vomiting. She has not had a bowel movement since yesterday, but she is passing gas, and she says that her abdomen feels much less distended and she is not experiencing the bloating sensation nearly as bad. I reviewed her abdominal x-ray results with her, reflecting nonspecific bowel gas pattern. I explained to her that at this point I could not rule out an early ileus, and I emphasized importance of her treating her digestive tract very gently, ensuring adequate fluid intake, and consuming low-fat, easy to digest foods (bananas, rice, applesauce, toast, crackers, chicken noodle soup, etc.). I told her to ex pect continued gradual improvement over the next few days, at which time she could slowly start reintroducing normal foods. I told her that if I did not hear from her, I would assume she was continuing to get better and this problem will have completely resolved by early next week. If not, she is to return for reevaluation.     PROBLEM/CONDITION: Her hypertension is much improved on the higher dose amlodipine/benazepril, which she is t olerating without adverse effect.     PROBLEM/CONDITION: She has not yet started the pantoprazole, but she says that she has not had her previously described gastroesophageal reflux disease symptoms since her last appointment here.     PROBLEM/CONDITION: She is due for a number of age-appropriate health maintenance interventions. She does not have insurance and she says that these are presently cost prohibitive. She agreed to return w hen she is able and willing to permit me to address these issues.    No other complaints or concerns reported.      REVIEW OF SYSTEMS:  "  CONSTITUTIONAL: No fever reported.  CARDIOVASCULAR: No chest pain reported.  PULMONARY: No trouble breathing reported.  PSYCHIATRIC: No mood instability reported.     PHYSICAL EXAM:   CONSTITUTIONAL: Vital signs (BP, P, T, RR, et al) noted. No apparent distress. Does not appear acutely ill or septic. Appears adequately hydrated.  HEENT: External ENT grossly unremarkable. Hearing grossly intact. Oropharynx moist.  PULM: Lungs clear. Breathing unlabored.  CV: Heart regular. No jugular venous distention. No carotid bruit.  ABDOMEN: Soft with MINIMAL diffuse tenderness, but without focal tenderness, guarding, or rebound tenderness. Bowel sounds normal. No appreciable organomegaly or abdominal mass on palpation. Abdominal aorta nonpalpable. No abdominal bruit.   DERM: Skin warm and moist with normal turgor.  NEURO: Strength is grossly normal and reasonably symmetric in major muscle groups bilaterally. There are no gross focal motor deficits or gross deficits of cranial nerves III-XII.  PSYCHIATRIC: Alert and oriented x 3. Mood is grossly neutral. Affect appropriate. Judgment and insight not grossly compromised.     ASSESSMENT:     1. Abdominal pain, generalized    2. Benign essential hypertension    3. Gastro-esophageal reflux disease without esophagitis        PLAN:   NOTE: Unless specified otherwise, the PLAN for a listed ASSESSMENT is to continue present treatment as prescribed. The planned follow-up and additional "Patient Instructions" may also be found in the After Visit Summary printed and provided to the patient.    Abdominal pain, generalized    Benign essential hypertension    Gastro-esophageal reflux disease without esophagitis        There are no discontinued medications.     "

## 2017-06-07 NOTE — LETTER
June 7, 2017       [facility]  [address] [facility] [place] [address]  Phone: [phone]  Fax: [phone]   Patient: Adeel Gordon   MR Number: [id number]   YOB: 1975   Date of Visit: 6/7/2017     TO WHOM IT MAY CONCERN:     Adeel was seen in my office yesterday and today for treatment of an illness.    Assuming she continues to improve, Adeel is anticipated to be able to return to work on Monday, June 12, 2017.    Please extend to her all due courtesies and considerations and excuse her absences.    Sincerely,     DAISY Leigh MD

## 2017-09-19 ENCOUNTER — NURSE TRIAGE (OUTPATIENT)
Dept: ADMINISTRATIVE | Facility: CLINIC | Age: 42
End: 2017-09-19

## 2017-09-19 NOTE — TELEPHONE ENCOUNTER
"    Reason for Disposition   Taking a medicine that could cause dizziness (e.g., blood pressure medications, diuretics)    Protocols used: ST DIZZINESS-A-OH    Pt c/o "eyes going crusseed, blurred vision,dizzy and wanted to fait once Sunday and  once today." pt states she does not feel like that now. States when it came it lasted about 5 minutes and she felt weak afterwards. Care advice given. Please call pt to advise.   "

## 2017-09-20 ENCOUNTER — OFFICE VISIT (OUTPATIENT)
Dept: INTERNAL MEDICINE | Facility: CLINIC | Age: 42
End: 2017-09-20

## 2017-09-20 VITALS
TEMPERATURE: 98 F | DIASTOLIC BLOOD PRESSURE: 88 MMHG | BODY MASS INDEX: 35.29 KG/M2 | HEIGHT: 66 IN | HEART RATE: 95 BPM | SYSTOLIC BLOOD PRESSURE: 126 MMHG | OXYGEN SATURATION: 96 % | WEIGHT: 219.56 LBS

## 2017-09-20 DIAGNOSIS — H53.9 VISUAL DISTURBANCE: Primary | ICD-10-CM

## 2017-09-20 PROCEDURE — 99999 PR PBB SHADOW E&M-EST. PATIENT-LVL IV: CPT | Mod: PBBFAC,,, | Performed by: FAMILY MEDICINE

## 2017-09-20 PROCEDURE — 99213 OFFICE O/P EST LOW 20 MIN: CPT | Mod: S$PBB,,, | Performed by: FAMILY MEDICINE

## 2017-09-20 PROCEDURE — 99214 OFFICE O/P EST MOD 30 MIN: CPT | Mod: PBBFAC,PO | Performed by: FAMILY MEDICINE

## 2017-09-20 NOTE — PROGRESS NOTES
"HEALTH MAINTENANCE REVIEW  Health Maintenance   Topic Date Due    Pneumococcal PPSV23 (Medium Risk) (1) 02/23/1993    Mammogram  10/08/2016    Influenza Vaccine  08/01/2017    Pap Smear with HPV Cotest  10/07/2018    TETANUS VACCINE  11/10/2026    Lipid Panel  Completed        HEALTH MAINTENANCE INTERVENTIONS - DUE OR DUE SOON  Health Maintenance Due   Topic Date Due    Pneumococcal PPSV23 (Medium Risk) (1) 02/23/1993    Mammogram  10/08/2016    Influenza Vaccine  08/01/2017       FUTURE APPOINTMENTS  No future appointments.    CHIEF COMPLAINT  Blurred Vision and Headache      HISTORY OF PRESENT ILLNESS  PROBLEM/CONDITION: New complaint is vision problems. QUALITY described as "seeing triple." She says that she is seeing 3 of everything. She says that this is so if she looks with both eyes, or if she covers one eye and just looks with one eye. She reports no recent head injuries. ONSET was over the last few days. She reports no other ASSOCIATED SYMPTOMS. I explained to her that I had no explanation for her symptoms, and she agreed to accept my referral to optometry for further evaluation and treatment.    No other complaints or concerns reported.    Problem List Items Addressed This Visit     Visual disturbance - Primary    Current Assessment & Plan     Intermittent "seeing triple..three of everything"         Relevant Orders    Ambulatory referral to Optometry          REVIEW OF SYSTEMS  CONSTITUTIONAL: No fever reported.  CARDIOVASCULAR: No chest pain reported.  PULMONARY: No trouble breathing reported.   NEUROLOGIC: No seizures or syncope reported.     PHYSICAL EXAM  Vitals:    09/20/17 1116   BP: 126/88   BP Location: Right arm   Patient Position: Sitting   BP Method: Large (Manual)   Pulse: 95   Temp: 97.5 °F (36.4 °C)   TempSrc: Tympanic   SpO2: 96%   Weight: 99.6 kg (219 lb 9.3 oz)   Height: 5' 6" (1.676 m)     CONSTITUTIONAL: Vital signs noted. No apparent distress. Does not appear acutely " ill or septic. Appears adequately hydrated.  EYES: Pupils equal and reactive. Extraocular movements intact. Sclerae anicteric. Lids and conjunctiva unremarkable. Cornea and anterior chamber are unremarkable.   ENT: External ENT unremarkable. Oropharynx moist without lesion, exudate or inflammation. Posterior oropharynx symmetric with midline uvula. Lips and tongue unremarkable. Nasal mucosa pink. Ear canals unremarkable. Tympanic membranes normal. Hearing grossly intact.  PULM: Lungs clear. Breathing unlabored.  CV: Heart regular.  GI: Abdomen soft and nontender.  DERM: Warm and moist.  PSYCHIATRIC: Alert and oriented x 3. Mood is grossly neutral. Affect appropriate. Judgment and insight not grossly compromised.     PAST MEDICAL HISTORY, FAMILY HISTORY, SOCIAL HISTORY, CURRENT MEDICATION LIST, and ALLERGY LIST reviewed by me (DAISY Leigh MD) and are updated consistent with the patient's report.    ASSESSMENT and PLAN  Visual disturbance  -     Ambulatory referral to Optometry        Medication List with Changes/Refills   Current Medications    AMLODIPINE-BENAZEPRIL 10-20MG (LOTREL) 10-20 MG PER CAPSULE    Take 1 capsule by mouth once daily.    FERROUS SULFATE 325 MG (65 MG IRON) TAB TABLET    Take 1 tablet (325 mg total) by mouth 2 (two) times daily.    IBUPROFEN (ADVIL,MOTRIN) 800 MG TABLET    Take 1 tablet (800 mg total) by mouth every 6 (six) hours as needed for Pain.   Changed and/or Refilled Medications    Modified Medication Previous Medication    FERROUS GLUCONATE (FERGON) 324 MG TABLET ferrous gluconate (FERGON) 325 MG Tab       TAKE ONE TABLET BY MOUTH TWICE DAILY WITH MEALS    Take 1 tablet (325 mg total) by mouth 2 (two) times daily with meals.   Discontinued Medications    PANTOPRAZOLE (PROTONIX) 40 MG TABLET    Take 1 tablet (40 mg total) by mouth once daily.       Return if symptoms worsen or fail to improve.    ABOUT THIS DOCUMENTATION:  · The order of the conditions listed in the HPI is  "one of convenience and does not necessarily reflect the chronology of the appointment, nor the relative importance of a condition. It is possible that additional description or status details about condition(s) may be found elsewhere in the documentation for today's encounter.  · Documentation entered by me for this encounter was done in part using speech-recognition technology. Although I have made an effort to ensure accuracy, "sound like" errors may exist and should be interpreted in context.                        -DAISY Leigh MD    There are no Patient Instructions on file for this visit.     "

## 2017-10-20 DIAGNOSIS — N92.0 MENORRHAGIA WITH REGULAR CYCLE: ICD-10-CM

## 2017-10-23 RX ORDER — FERROUS GLUCONATE 324(38)MG
TABLET ORAL
Qty: 60 TABLET | Refills: 12 | Status: SHIPPED | OUTPATIENT
Start: 2017-10-23 | End: 2018-07-13

## 2018-06-14 DIAGNOSIS — I10 BENIGN ESSENTIAL HYPERTENSION: ICD-10-CM

## 2018-06-15 RX ORDER — AMLODIPINE AND BENAZEPRIL HYDROCHLORIDE 10; 20 MG/1; MG/1
1 CAPSULE ORAL DAILY
Qty: 90 CAPSULE | Refills: 3 | Status: SHIPPED | OUTPATIENT
Start: 2018-06-15 | End: 2018-07-13 | Stop reason: SDUPTHER

## 2018-06-15 NOTE — TELEPHONE ENCOUNTER
I send eRx refill of the requested medications. An office visit is required prior to additional refills.    TASK: Contact the patient and have her schedule a timely follow-up appointment with me, anytime prior to needing another refill.

## 2018-06-19 NOTE — TELEPHONE ENCOUNTER
Notified patient she must be seen in office prior to additional refills. Patient verbalized understanding and informed me she would call back to schedule. Ensured she had [phone] desk number. Patient thanked me and ended call.

## 2018-07-13 ENCOUNTER — TELEPHONE (OUTPATIENT)
Dept: PULMONOLOGY | Facility: CLINIC | Age: 43
End: 2018-07-13

## 2018-07-13 ENCOUNTER — LAB VISIT (OUTPATIENT)
Dept: LAB | Facility: HOSPITAL | Age: 43
End: 2018-07-13
Attending: FAMILY MEDICINE
Payer: COMMERCIAL

## 2018-07-13 ENCOUNTER — OFFICE VISIT (OUTPATIENT)
Dept: INTERNAL MEDICINE | Facility: CLINIC | Age: 43
End: 2018-07-13
Payer: COMMERCIAL

## 2018-07-13 VITALS
HEART RATE: 100 BPM | SYSTOLIC BLOOD PRESSURE: 144 MMHG | HEIGHT: 66 IN | TEMPERATURE: 98 F | BODY MASS INDEX: 38.02 KG/M2 | OXYGEN SATURATION: 97 % | WEIGHT: 236.56 LBS | DIASTOLIC BLOOD PRESSURE: 96 MMHG

## 2018-07-13 DIAGNOSIS — F17.290 NICOTINE DEPENDENCE, OTHER TOBACCO PRODUCT, UNCOMPLICATED: ICD-10-CM

## 2018-07-13 DIAGNOSIS — Z12.39 SCREENING FOR BREAST CANCER: ICD-10-CM

## 2018-07-13 DIAGNOSIS — D50.0 IRON DEFICIENCY ANEMIA DUE TO CHRONIC BLOOD LOSS: ICD-10-CM

## 2018-07-13 DIAGNOSIS — E66.01 CLASS 2 SEVERE OBESITY DUE TO EXCESS CALORIES WITH SERIOUS COMORBIDITY AND BODY MASS INDEX (BMI) OF 38.0 TO 38.9 IN ADULT: Chronic | ICD-10-CM

## 2018-07-13 DIAGNOSIS — I10 BENIGN ESSENTIAL HYPERTENSION: ICD-10-CM

## 2018-07-13 DIAGNOSIS — Z23 NEED FOR PNEUMOCOCCAL VACCINATION: ICD-10-CM

## 2018-07-13 DIAGNOSIS — R63.5 WEIGHT GAIN: Chronic | ICD-10-CM

## 2018-07-13 DIAGNOSIS — K21.9 GASTRO-ESOPHAGEAL REFLUX DISEASE WITHOUT ESOPHAGITIS: Chronic | ICD-10-CM

## 2018-07-13 DIAGNOSIS — Z98.890 HISTORY OF ENDOMETRIAL ABLATION: Chronic | ICD-10-CM

## 2018-07-13 DIAGNOSIS — D50.0 IRON DEFICIENCY ANEMIA DUE TO CHRONIC BLOOD LOSS: Primary | ICD-10-CM

## 2018-07-13 DIAGNOSIS — R53.83 OTHER FATIGUE: ICD-10-CM

## 2018-07-13 DIAGNOSIS — Z98.51 S/P TUBAL LIGATION: Chronic | ICD-10-CM

## 2018-07-13 DIAGNOSIS — Z12.4 CERVICAL CANCER SCREENING: ICD-10-CM

## 2018-07-13 PROBLEM — N92.0 MENORRHAGIA WITH REGULAR CYCLE: Status: RESOLVED | Noted: 2017-01-06 | Resolved: 2018-07-13

## 2018-07-13 PROBLEM — R10.84 ABDOMINAL PAIN, GENERALIZED: Status: RESOLVED | Noted: 2017-06-06 | Resolved: 2018-07-13

## 2018-07-13 PROBLEM — H53.9 VISUAL DISTURBANCE: Status: RESOLVED | Noted: 2017-09-20 | Resolved: 2018-07-13

## 2018-07-13 LAB
ALBUMIN SERPL BCP-MCNC: 3.8 G/DL
ALP SERPL-CCNC: 64 U/L
ALT SERPL W/O P-5'-P-CCNC: 12 U/L
ANION GAP SERPL CALC-SCNC: 10 MMOL/L
ANION GAP SERPL CALC-SCNC: 10 MMOL/L
AST SERPL-CCNC: 14 U/L
BASOPHILS # BLD AUTO: 0.07 K/UL
BASOPHILS NFR BLD: 0.7 %
BILIRUB SERPL-MCNC: 0.5 MG/DL
BUN SERPL-MCNC: 8 MG/DL
BUN SERPL-MCNC: 8 MG/DL
CALCIUM SERPL-MCNC: 9.7 MG/DL
CALCIUM SERPL-MCNC: 9.7 MG/DL
CHLORIDE SERPL-SCNC: 109 MMOL/L
CHLORIDE SERPL-SCNC: 109 MMOL/L
CHOLEST SERPL-MCNC: 174 MG/DL
CHOLEST/HDLC SERPL: 5.1 {RATIO}
CO2 SERPL-SCNC: 21 MMOL/L
CO2 SERPL-SCNC: 21 MMOL/L
CREAT SERPL-MCNC: 0.8 MG/DL
CREAT SERPL-MCNC: 0.8 MG/DL
DIFFERENTIAL METHOD: ABNORMAL
EOSINOPHIL # BLD AUTO: 0.2 K/UL
EOSINOPHIL NFR BLD: 2.2 %
ERYTHROCYTE [DISTWIDTH] IN BLOOD BY AUTOMATED COUNT: 14.8 %
EST. GFR  (AFRICAN AMERICAN): >60 ML/MIN/1.73 M^2
EST. GFR  (AFRICAN AMERICAN): >60 ML/MIN/1.73 M^2
EST. GFR  (NON AFRICAN AMERICAN): >60 ML/MIN/1.73 M^2
EST. GFR  (NON AFRICAN AMERICAN): >60 ML/MIN/1.73 M^2
ESTIMATED AVG GLUCOSE: 108 MG/DL
FERRITIN SERPL-MCNC: 66 NG/ML
GLUCOSE SERPL-MCNC: 75 MG/DL
GLUCOSE SERPL-MCNC: 75 MG/DL
HBA1C MFR BLD HPLC: 5.4 %
HCT VFR BLD AUTO: 43.5 %
HDLC SERPL-MCNC: 34 MG/DL
HDLC SERPL: 19.5 %
HGB BLD-MCNC: 14 G/DL
IMM GRANULOCYTES # BLD AUTO: 0.03 K/UL
IMM GRANULOCYTES NFR BLD AUTO: 0.3 %
IRON SERPL-MCNC: 105 UG/DL
LDLC SERPL CALC-MCNC: 101.4 MG/DL
LYMPHOCYTES # BLD AUTO: 2.7 K/UL
LYMPHOCYTES NFR BLD: 28 %
MCH RBC QN AUTO: 32.1 PG
MCHC RBC AUTO-ENTMCNC: 32.2 G/DL
MCV RBC AUTO: 100 FL
MONOCYTES # BLD AUTO: 0.7 K/UL
MONOCYTES NFR BLD: 7.1 %
NEUTROPHILS # BLD AUTO: 5.8 K/UL
NEUTROPHILS NFR BLD: 61.7 %
NONHDLC SERPL-MCNC: 140 MG/DL
NRBC BLD-RTO: 0 /100 WBC
PLATELET # BLD AUTO: 312 K/UL
PMV BLD AUTO: 9.4 FL
POTASSIUM SERPL-SCNC: 4.2 MMOL/L
POTASSIUM SERPL-SCNC: 4.2 MMOL/L
PROT SERPL-MCNC: 7 G/DL
RBC # BLD AUTO: 4.36 M/UL
SATURATED IRON: 31 %
SODIUM SERPL-SCNC: 140 MMOL/L
SODIUM SERPL-SCNC: 140 MMOL/L
TOTAL IRON BINDING CAPACITY: 336 UG/DL
TRANSFERRIN SERPL-MCNC: 227 MG/DL
TRIGL SERPL-MCNC: 193 MG/DL
TSH SERPL DL<=0.005 MIU/L-ACNC: 1.05 UIU/ML
WBC # BLD AUTO: 9.46 K/UL

## 2018-07-13 PROCEDURE — 99214 OFFICE O/P EST MOD 30 MIN: CPT | Mod: S$GLB,,, | Performed by: FAMILY MEDICINE

## 2018-07-13 PROCEDURE — 85025 COMPLETE CBC W/AUTO DIFF WBC: CPT

## 2018-07-13 PROCEDURE — 80053 COMPREHEN METABOLIC PANEL: CPT

## 2018-07-13 PROCEDURE — 99999 PR PBB SHADOW E&M-EST. PATIENT-LVL IV: CPT | Mod: PBBFAC,,, | Performed by: FAMILY MEDICINE

## 2018-07-13 PROCEDURE — 3077F SYST BP >= 140 MM HG: CPT | Mod: CPTII,S$GLB,, | Performed by: FAMILY MEDICINE

## 2018-07-13 PROCEDURE — 80061 LIPID PANEL: CPT

## 2018-07-13 PROCEDURE — 3080F DIAST BP >= 90 MM HG: CPT | Mod: CPTII,S$GLB,, | Performed by: FAMILY MEDICINE

## 2018-07-13 PROCEDURE — 82728 ASSAY OF FERRITIN: CPT

## 2018-07-13 PROCEDURE — 84443 ASSAY THYROID STIM HORMONE: CPT

## 2018-07-13 PROCEDURE — 83540 ASSAY OF IRON: CPT

## 2018-07-13 PROCEDURE — 36415 COLL VENOUS BLD VENIPUNCTURE: CPT | Mod: PO

## 2018-07-13 PROCEDURE — 3008F BODY MASS INDEX DOCD: CPT | Mod: CPTII,S$GLB,, | Performed by: FAMILY MEDICINE

## 2018-07-13 PROCEDURE — 83036 HEMOGLOBIN GLYCOSYLATED A1C: CPT

## 2018-07-13 RX ORDER — PANTOPRAZOLE SODIUM 40 MG/1
40 TABLET, DELAYED RELEASE ORAL DAILY
Qty: 90 TABLET | Refills: 3 | Status: SHIPPED | OUTPATIENT
Start: 2018-07-13 | End: 2019-09-13

## 2018-07-13 RX ORDER — CHLORTHALIDONE 25 MG/1
25 TABLET ORAL DAILY
Qty: 30 TABLET | Refills: 1 | Status: SHIPPED | OUTPATIENT
Start: 2018-07-13 | End: 2018-08-17 | Stop reason: SDUPTHER

## 2018-07-13 RX ORDER — AMLODIPINE AND BENAZEPRIL HYDROCHLORIDE 10; 20 MG/1; MG/1
1 CAPSULE ORAL DAILY
Qty: 90 CAPSULE | Refills: 3 | Status: SHIPPED | OUTPATIENT
Start: 2018-07-13 | End: 2019-08-13 | Stop reason: SDUPTHER

## 2018-07-13 NOTE — PATIENT INSTRUCTIONS
[email or web address]    You can get help with [facility] and [facility]:  by email at  [email or web address]  or by phone at [phone]    The [facility] Patient Support Team is available Monday through Friday from 9 a.m. until 5 p.m.  (After hours, you can leave a message requesting assistance.)    Additional Important Information  · [employer] is NOT to be used for urgent needs.  · Although we try to respond to messages within 2-3 business days, a response can take longer, depending on circumstances.  · For medical emergencies, dial 911.      Quitting tobacco can be difficult, and I want you to have the best chance possible for success.  Call the [place] Tobacco Quitline (1-800-QUIT-NOW) to partner with your personal Quit . The [place] Tobacco Quitline has trained, dedicated Quit Coaches who have helped thousands of people quit using tobacco. Your Quit  will be there to support you through the quit process. You can call anytime, 24 hours a day, 7 days a week.

## 2018-07-13 NOTE — PROGRESS NOTES
CHIEF COMPLAINT  Annual Exam      HISTORY OF PRESENT ILLNESS    Problem List Items Addressed This Visit        Cardiac/Vascular    Benign essential hypertension  ASSESSMENT: This condition appears to be POORLY CONTROLLED and ASYMPTOMATIC.      Relevant Medications    amlodipine-benazepril 10-20mg (LOTREL) 10-20 mg per capsule    chlorthalidone (HYGROTEN) 25 MG Tab    Other Relevant Orders    BASIC METABOLIC PANEL (Completed)       Renal/    S/P tubal ligation (Chronic)    History of endometrial ablation (Chronic)    Relevant Orders    Ambulatory referral to Gynecology       Oncology    Iron deficiency anemia due to chronic blood loss - Primary  ASSESSMENT: This condition appears to be STABLE, due for f/u lab work. No bleeding problems reported.    Relevant Orders    Iron and TIBC (Completed)    FERRITIN (Completed)    CBC W/ AUTO DIFFERENTIAL (Completed)       Endocrine    Weight gain (Chronic)    Current Assessment & Plan     --------------------------------------------------------------------------------  RELEVANT DATA REVIEWED:  Wt Readings from Last 6 Encounters:   07/13/18 107.3 kg (236 lb 8.9 oz)   09/20/17 99.6 kg (219 lb 9.3 oz)   06/07/17 97.6 kg (215 lb 2.7 oz)   06/06/17 89.2 kg (196 lb 10.4 oz)   02/28/17 99.8 kg (220 lb)   02/10/17 99.8 kg (220 lb)     BMI Readings from Last 6 Encounters:   07/13/18 38.18 kg/m²   09/20/17 35.44 kg/m²   06/07/17 34.73 kg/m²   06/06/17 31.74 kg/m²   02/28/17 35.51 kg/m²   02/10/17 35.51 kg/m²   This problem is NEW TO ME. This problem REQUIRES FURTHER WORK-UP.   --------------------------------------------------------------------------------             GI    Gastro-esophageal reflux disease without esophagitis (Chronic)  ASSESSMENT: This condition appears to be CONTROLLED and STABLE.    Relevant Medications    pantoprazole (PROTONIX) 40 MG tablet       Other    Nicotine dependence, other tobacco product, uncomplicated    Overview     Smokes cigars and e-cigarettes.          Current Assessment & Plan     She is currently at the preparation stage of smoking cessation (getting ready to quit). Smoking cessation encouraged.         Other fatigue    Current Assessment & Plan     STOP-BANG questionnaire to assess risk for obstructive sleep apnea (KRISS)  · Snoring: Do you snore loudly? ANSWER: YES  · Tired: Do you often feel tired, fatigued, or sleepy during daytime? ANSWER: YES (Fatigue moreso than hypersomnia; [place] Sleepiness Scale score = 6.)  · Observed: Has anyone observed you stop breathing during your sleep? ANSWER: NO  · Pressure: Do you have or are you being treated for high blood pressure? ANSWER: YES  · BMI: BMI more than 35 kg/m2? ANSWER: YES (BMI = Body mass index is 38.18 kg/m².)   · Age: Age over 50 yr old? ANSWER: NO (Age = 43 y.o.)  · Neck circumference: Neck circumference greater than 40 cm? ANSWER: YES (Neck circumference = 43.5 cm, Mallampati class 3 oropharynx.  · Gender: Gender male? ANSWER: NO (Gender = female)    STOP-BANG Score = 5 (HIGH risk of KRISS)    ADDITIONAL RELEVANT HISTORY: reports non-restorative sleep, reports waking up gasping/choking, reports frequent tension-type headaches and reports involuntary weight-gain           Relevant Orders    Home Sleep Studies    Class 2 severe obesity due to excess calories with serious comorbidity and body mass index (BMI) of 38.0 to 38.9 in adult (Chronic)    Current Assessment & Plan     --------------------------------------------------------------------------------  RELEVANT DATA REVIEWED:  Wt Readings from Last 6 Encounters:   07/13/18 107.3 kg (236 lb 8.9 oz)   09/20/17 99.6 kg (219 lb 9.3 oz)   06/07/17 97.6 kg (215 lb 2.7 oz)   06/06/17 89.2 kg (196 lb 10.4 oz)   02/28/17 99.8 kg (220 lb)   02/10/17 99.8 kg (220 lb)     BMI Readings from Last 6 Encounters:   07/13/18 38.18 kg/m²   09/20/17 35.44 kg/m²   06/07/17 34.73 kg/m²   06/06/17 31.74 kg/m²   02/28/17 35.51 kg/m²   02/10/17 35.51 kg/m²      Lab Results    Component Value Date    CHOL 170 11/10/2016    TRIG 186 (H) 11/10/2016    HDL 32 (L) 11/10/2016    LDLCALC 100.8 11/10/2016    NONHDLCHOL 138 11/10/2016    AST 17 10/07/2015    ALT 12 10/07/2015       This condition appears to be WORSE. Therapeutic lifestyle changes encouraged.  --------------------------------------------------------------------------------          Relevant Orders    TSH (Completed)    Lipid panel (Completed)    COMPREHENSIVE METABOLIC PANEL (Completed)    Hemoglobin A1c (Completed)      Other Visit Diagnoses     Screening for breast cancer        Relevant Orders    Mammo Digital Screening Bilat with CAD    Cervical cancer screening        Relevant Orders    Ambulatory referral to Gynecology    Need for pneumococcal vaccination        Relevant Orders    Pneumococcal Polysaccharide Vaccine (23 Valent) (SQ/IM)          PAST MEDICAL HISTORY, FAMILY HISTORY and SOCIAL HISTORY reviewed by me (DAISY Leigh MD) and are updated consistent with the patient's report.    CURRENT MEDICATION LIST, and ALLERGY LIST reviewed by me (DAISY Leigh MD) and are updated consistent with the patient's report as follows:    Outpatient Medications Prior to Visit   Medication Sig Dispense Refill    ferrous sulfate 325 mg (65 mg iron) Tab tablet Take 1 tablet (325 mg total) by mouth 2 (two) times daily. 60 tablet 3    amlodipine-benazepril 10-20mg (LOTREL) 10-20 mg per capsule Take 1 capsule by mouth once daily. 90 capsule 3    ferrous gluconate (FERGON) 324 MG tablet TAKE ONE TABLET BY MOUTH TWICE DAILY WITH MEALS 60 tablet 12     No facility-administered medications prior to visit.        Allergies as of 07/13/2018    (No Known Allergies)       REVIEW OF SYSTEMS  CONSTITUTIONAL: No fever reported.  CARDIOVASCULAR: No chest pain reported.  PULMONARY: No trouble breathing reported.     PHYSICAL EXAM  Vitals:    07/13/18 0843   BP: (!) 144/96   BP Location: Right arm   Patient Position: Sitting   BP Method:  "Large (Manual)   Pulse: 100   Temp: 97.6 °F (36.4 °C)   TempSrc: Tympanic   SpO2: 97%   Weight: 107.3 kg (236 lb 8.9 oz)   Height: 5' 6" (1.676 m)     CONSTITUTIONAL: Vital signs noted. No apparent distress. Does not appear acutely ill or septic. Appears adequately hydrated.  PULM: Lungs clear. Breathing unlabored.  CV: Heart regular.     ASSESSMENT and PLAN  Iron deficiency anemia due to chronic blood loss  -     Iron and TIBC; Future; Expected date: 07/13/2018  -     FERRITIN; Future; Expected date: 07/13/2018  -     CBC W/ AUTO DIFFERENTIAL; Future; Expected date: 07/13/2018    Class 2 severe obesity due to excess calories with serious comorbidity and body mass index (BMI) of 38.0 to 38.9 in adult  -     TSH; Future; Expected date: 07/13/2018  -     Lipid panel; Future; Expected date: 07/13/2018  -     COMPREHENSIVE METABOLIC PANEL; Future; Expected date: 07/13/2018  -     Hemoglobin A1c; Future; Expected date: 07/13/2018    Benign essential hypertension  -     amlodipine-benazepril 10-20mg (LOTREL) 10-20 mg per capsule; Take 1 capsule by mouth once daily.  Dispense: 90 capsule; Refill: 3  -     chlorthalidone (HYGROTEN) 25 MG Tab; Take 1 tablet (25 mg total) by mouth once daily.  Dispense: 30 tablet; Refill: 1  -     BASIC METABOLIC PANEL; Future; Expected date: 08/13/2018    Gastro-esophageal reflux disease without esophagitis  -     pantoprazole (PROTONIX) 40 MG tablet; Take 1 tablet (40 mg total) by mouth once daily.  Dispense: 90 tablet; Refill: 3    S/P tubal ligation    History of endometrial ablation  -     Ambulatory referral to Gynecology    Weight gain    Nicotine dependence, other tobacco product, uncomplicated    Other fatigue  -     Home Sleep Studies; Future    Screening for breast cancer  -     Mammo Digital Screening Bilat with CAD; Future; Expected date: 07/13/2018    Cervical cancer screening  -     Ambulatory referral to Gynecology    Need for pneumococcal vaccination  -     Pneumococcal " Polysaccharide Vaccine (23 Valent) (SQ/IM)        PRESCRIPTION MEDICATION MANAGEMENT  Except as noted below, CURRENT MEDICATIONS are to remain unchanged from that listed above.    Medications Ordered This Encounter      amlodipine-benazepril 10-20mg (LOTREL) 10-20 mg per capsule          Sig: Take 1 capsule by mouth once daily.          Dispense:  90 capsule          Refill:  3          This REPLACES any prior prescription for this drug. DISCONTINUE any prior prescription for this drug.      chlorthalidone (HYGROTEN) 25 MG Tab          Sig: Take 1 tablet (25 mg total) by mouth once daily.          Dispense:  30 tablet          Refill:  1      pantoprazole (PROTONIX) 40 MG tablet          Sig: Take 1 tablet (40 mg total) by mouth once daily.          Dispense:  90 tablet          Refill:  3  Medications Discontinued During This Encounter   Medication Reason    ferrous gluconate (FERGON) 324 MG tablet Patient no longer taking    amlodipine-benazepril 10-20mg (LOTREL) 10-20 mg per capsule Reorder       Follow-up in about 1 month (around 8/13/2018) for review test results and discuss treatment plan, re-evaluate problem(s) discussed today.    Patient Instructions   [email or web address]    You can get help with [facility] and [facility]:  by email at  [email or web address]  or by phone at [phone]    The [facility] Patient Support Team is available Monday through Friday from 9 a.m. until 5 p.m.  (After hours, you can leave a message requesting assistance.)    Additional Important Information  · [employer] is NOT to be used for urgent needs.  · Although we try to respond to messages within 2-3 business days, a response can take longer, depending on circumstances.  · For medical emergencies, dial 911.      Quitting tobacco can be difficult, and I want you to have the best chance possible for success.  Call the [place] Tobacco Quitline (4-965-QUITNOW) to partner with your personal Quit . The [place]  "Tobacco Quitline has trained, dedicated Quit Coaches who have helped thousands of people quit using tobacco. Your Quit  will be there to support you through the quit process. You can call anytime, 24 hours a day, 7 days a week.      ABOUT THIS DOCUMENTATION:  · The order of the conditions listed in the HPI is one of convenience and does not necessarily reflect the chronology of the appointment, nor the relative importance of a condition.  · Documentation entered by me for this encounter was done in part using speech-recognition technology. Although I have made an effort to ensure accuracy, "sound like" errors may exist and should be interpreted in context.                        -DAISY Leigh MD     "

## 2018-07-13 NOTE — ASSESSMENT & PLAN NOTE
STOP-BANG questionnaire to assess risk for obstructive sleep apnea (KRISS)  · Snoring: Do you snore loudly? ANSWER: YES  · Tired: Do you often feel tired, fatigued, or sleepy during daytime? ANSWER: YES (Fatigue moreso than hypersomnia; [place] Sleepiness Scale score = 6.)  · Observed: Has anyone observed you stop breathing during your sleep? ANSWER: NO  · Pressure: Do you have or are you being treated for high blood pressure? ANSWER: YES  · BMI: BMI more than 35 kg/m2? ANSWER: YES (BMI = Body mass index is 38.18 kg/m².)   · Age: Age over 50 yr old? ANSWER: NO (Age = 43 y.o.)  · Neck circumference: Neck circumference greater than 40 cm? ANSWER: YES (Neck circumference = 43.5 cm, Mallampati class 3 oropharynx.  · Gender: Gender male? ANSWER: NO (Gender = female)    STOP-BANG Score = 5 (HIGH risk of KRISS)    ADDITIONAL RELEVANT HISTORY: reports non-restorative sleep, reports waking up gasping/choking, reports frequent tension-type headaches and reports involuntary weight-gain

## 2018-07-13 NOTE — ASSESSMENT & PLAN NOTE
--------------------------------------------------------------------------------  RELEVANT DATA REVIEWED:  Wt Readings from Last 6 Encounters:   07/13/18 107.3 kg (236 lb 8.9 oz)   09/20/17 99.6 kg (219 lb 9.3 oz)   06/07/17 97.6 kg (215 lb 2.7 oz)   06/06/17 89.2 kg (196 lb 10.4 oz)   02/28/17 99.8 kg (220 lb)   02/10/17 99.8 kg (220 lb)     BMI Readings from Last 6 Encounters:   07/13/18 38.18 kg/m²   09/20/17 35.44 kg/m²   06/07/17 34.73 kg/m²   06/06/17 31.74 kg/m²   02/28/17 35.51 kg/m²   02/10/17 35.51 kg/m²      Lab Results   Component Value Date    CHOL 170 11/10/2016    TRIG 186 (H) 11/10/2016    HDL 32 (L) 11/10/2016    LDLCALC 100.8 11/10/2016    NONHDLCHOL 138 11/10/2016    AST 17 10/07/2015    ALT 12 10/07/2015       This condition appears to be WORSE. Therapeutic lifestyle changes encouraged.  --------------------------------------------------------------------------------

## 2018-07-13 NOTE — ASSESSMENT & PLAN NOTE
--------------------------------------------------------------------------------  RELEVANT DATA REVIEWED:  Wt Readings from Last 6 Encounters:   07/13/18 107.3 kg (236 lb 8.9 oz)   09/20/17 99.6 kg (219 lb 9.3 oz)   06/07/17 97.6 kg (215 lb 2.7 oz)   06/06/17 89.2 kg (196 lb 10.4 oz)   02/28/17 99.8 kg (220 lb)   02/10/17 99.8 kg (220 lb)     BMI Readings from Last 6 Encounters:   07/13/18 38.18 kg/m²   09/20/17 35.44 kg/m²   06/07/17 34.73 kg/m²   06/06/17 31.74 kg/m²   02/28/17 35.51 kg/m²   02/10/17 35.51 kg/m²   This problem is NEW TO ME. This problem REQUIRES FURTHER WORK-UP.   --------------------------------------------------------------------------------

## 2018-07-25 ENCOUNTER — PATIENT MESSAGE (OUTPATIENT)
Dept: INTERNAL MEDICINE | Facility: CLINIC | Age: 43
End: 2018-07-25

## 2018-08-17 ENCOUNTER — OFFICE VISIT (OUTPATIENT)
Dept: OBSTETRICS AND GYNECOLOGY | Facility: CLINIC | Age: 43
End: 2018-08-17
Payer: COMMERCIAL

## 2018-08-17 ENCOUNTER — OFFICE VISIT (OUTPATIENT)
Dept: INTERNAL MEDICINE | Facility: CLINIC | Age: 43
End: 2018-08-17
Payer: COMMERCIAL

## 2018-08-17 ENCOUNTER — HOSPITAL ENCOUNTER (OUTPATIENT)
Dept: RADIOLOGY | Facility: HOSPITAL | Age: 43
Discharge: HOME | End: 2018-08-17
Attending: FAMILY MEDICINE
Payer: COMMERCIAL

## 2018-08-17 VITALS
DIASTOLIC BLOOD PRESSURE: 86 MMHG | HEART RATE: 87 BPM | WEIGHT: 232.13 LBS | DIASTOLIC BLOOD PRESSURE: 86 MMHG | OXYGEN SATURATION: 100 % | SYSTOLIC BLOOD PRESSURE: 124 MMHG | BODY MASS INDEX: 37.29 KG/M2 | HEIGHT: 66 IN | TEMPERATURE: 97 F | WEIGHT: 232.06 LBS | BODY MASS INDEX: 37.31 KG/M2 | SYSTOLIC BLOOD PRESSURE: 124 MMHG | HEIGHT: 66 IN

## 2018-08-17 DIAGNOSIS — I10 BENIGN ESSENTIAL HYPERTENSION: Primary | ICD-10-CM

## 2018-08-17 DIAGNOSIS — D50.0 IRON DEFICIENCY ANEMIA DUE TO CHRONIC BLOOD LOSS: ICD-10-CM

## 2018-08-17 DIAGNOSIS — K21.9 GASTRO-ESOPHAGEAL REFLUX DISEASE WITHOUT ESOPHAGITIS: Chronic | ICD-10-CM

## 2018-08-17 DIAGNOSIS — Z01.419 ENCOUNTER FOR GYNECOLOGICAL EXAMINATION WITHOUT ABNORMAL FINDING: Primary | ICD-10-CM

## 2018-08-17 DIAGNOSIS — Z79.899 ENCOUNTER FOR LONG-TERM CURRENT USE OF MEDICATION: ICD-10-CM

## 2018-08-17 DIAGNOSIS — R53.83 OTHER FATIGUE: ICD-10-CM

## 2018-08-17 DIAGNOSIS — Z12.39 SCREENING FOR BREAST CANCER: ICD-10-CM

## 2018-08-17 DIAGNOSIS — E66.01 CLASS 2 SEVERE OBESITY DUE TO EXCESS CALORIES WITH SERIOUS COMORBIDITY AND BODY MASS INDEX (BMI) OF 38.0 TO 38.9 IN ADULT: Chronic | ICD-10-CM

## 2018-08-17 PROCEDURE — 3074F SYST BP LT 130 MM HG: CPT | Mod: CPTII,S$GLB,, | Performed by: FAMILY MEDICINE

## 2018-08-17 PROCEDURE — 77067 SCR MAMMO BI INCL CAD: CPT | Mod: 26,,, | Performed by: RADIOLOGY

## 2018-08-17 PROCEDURE — 99214 OFFICE O/P EST MOD 30 MIN: CPT | Mod: S$GLB,,, | Performed by: FAMILY MEDICINE

## 2018-08-17 PROCEDURE — 77063 BREAST TOMOSYNTHESIS BI: CPT | Mod: 26,,, | Performed by: RADIOLOGY

## 2018-08-17 PROCEDURE — 77063 BREAST TOMOSYNTHESIS BI: CPT | Mod: TC,PO

## 2018-08-17 PROCEDURE — 3079F DIAST BP 80-89 MM HG: CPT | Mod: CPTII,S$GLB,, | Performed by: FAMILY MEDICINE

## 2018-08-17 PROCEDURE — 99999 PR PBB SHADOW E&M-EST. PATIENT-LVL III: CPT | Mod: PBBFAC,,, | Performed by: FAMILY MEDICINE

## 2018-08-17 PROCEDURE — 3079F DIAST BP 80-89 MM HG: CPT | Mod: CPTII,S$GLB,, | Performed by: NURSE PRACTITIONER

## 2018-08-17 PROCEDURE — 3074F SYST BP LT 130 MM HG: CPT | Mod: CPTII,S$GLB,, | Performed by: NURSE PRACTITIONER

## 2018-08-17 PROCEDURE — 99999 PR PBB SHADOW E&M-EST. PATIENT-LVL IV: CPT | Mod: PBBFAC,,, | Performed by: NURSE PRACTITIONER

## 2018-08-17 PROCEDURE — 88175 CYTOPATH C/V AUTO FLUID REDO: CPT

## 2018-08-17 PROCEDURE — 3008F BODY MASS INDEX DOCD: CPT | Mod: CPTII,S$GLB,, | Performed by: FAMILY MEDICINE

## 2018-08-17 PROCEDURE — 99396 PREV VISIT EST AGE 40-64: CPT | Mod: S$GLB,,, | Performed by: NURSE PRACTITIONER

## 2018-08-17 RX ORDER — AMLODIPINE AND BENAZEPRIL HYDROCHLORIDE 10; 20 MG/1; MG/1
1 CAPSULE ORAL
COMMUNITY
End: 2018-08-17 | Stop reason: SDUPTHER

## 2018-08-17 RX ORDER — CHLORTHALIDONE 25 MG/1
25 TABLET ORAL DAILY
Qty: 90 TABLET | Refills: 3 | Status: SHIPPED | OUTPATIENT
Start: 2018-08-17 | End: 2019-09-13

## 2018-08-17 NOTE — PATIENT INSTRUCTIONS
You can get help with [facility]:  · online at [email or web address]  · by email at  [email or web address]  · or by phone at [phone]      [facility] has a very good comprehensive weight loss program and bariatric surgery program. To learn more:    COMPREHENSIVE WEIGHT LOSS PROGRAM - Phone [phone]     [email or web address]    BARIATRIC SURGERY PROGRAM - Phone [phone]     [email or web address]    You should be able to schedule your initial appointment without a formal referral from me. If you are told that you need a referral from your primary care physician, just let me know, and I'll be happy to provide this.

## 2018-08-17 NOTE — PATIENT INSTRUCTIONS

## 2018-08-17 NOTE — LETTER
August 17, 2018      DAISY Leigh MD  [address] [facility] [address]           [facility]  [address]  [place] [address]  Phone: [phone]  Fax: [phone]          Patient: Adeel Gordon   MR Number: [id number]   YOB: 1975   Date of Visit: 8/17/2018       Dear Dr. DAISY Leigh:    Thank you for referring Adeel Gordon to me for evaluation. Attached you will find relevant portions of my assessment and plan of care.    If you have questions, please do not hesitate to call me. I look forward to following Adeel Gordon along with you.    Sincerely,    Lorie Olvera, NP    Enclosure  CC:  No Recipients    If you would like to receive this communication electronically, please contact [email or web address] or [phone] to request more information on [employer] access.    For providers and/or their staff who would like to refer a patient to [facility], please contact us through our one-stop-shop provider referral line, [facility] , at [phone].    If you feel you have received this communication in error or would no longer like to receive these types of communications, please e-mail [email or web address]

## 2018-08-17 NOTE — PROGRESS NOTES
CHIEF COMPLAINT  Follow-up      HISTORY OF PRESENT ILLNESS    PROBLEM/CONDITION:  Hypertension appears well-controlled. No angina or angina equivalent symptoms reported. She says she is tolerating her current medications well without adverse effect.    PROBLEM/CONDITION: Gastroesophageal reflux disease appears well-controlled.    PROBLEM/CONDITION: Obesity is stable. She knowledges she has made no significant therapeutic lifestyle changes.    PROBLEM/CONDITION: I referred her to gynecology for further evaluation of bleeding after endometrial ablation possibly contributing to her iron deficiency anemia. It appears that this appointment was today, and as of this dictation I'm awaiting their recommendations.    PROBLEM/CONDITION: Fatigue is unchanged and she has not yet had home sleep study performed. I empathize importance of having this done and I told her to contact the sleep clinic with any questions.    MEDICATION TOLERANCE: Appears to be tolerating current treatment well without adverse side-effect reported.    No other complaints or concerns reported.    Problem List Items Addressed This Visit        Cardiac/Vascular    Benign essential hypertension - Primary    Relevant Medications    chlorthalidone (HYGROTEN) 25 MG Tab    Other Relevant Orders    Basic metabolic panel    Basic metabolic panel       Oncology    Iron deficiency anemia due to chronic blood loss       GI    Gastro-esophageal reflux disease without esophagitis (Chronic)       Other    Other fatigue    Class 2 severe obesity due to excess calories with serious comorbidity and body mass index (BMI) of 38.0 to 38.9 in adult (Chronic)      Other Visit Diagnoses     Encounter for long-term current use of medication        Relevant Orders    Basic metabolic panel    Basic metabolic panel          PAST MEDICAL HISTORY, FAMILY HISTORY and SOCIAL HISTORY reviewed by me (DAISY Leigh MD) and are updated consistent with the patient's report.    CURRENT  "MEDICATION LIST, and ALLERGY LIST reviewed by me (DAISY Leigh MD) and are updated consistent with the patient's report as follows:    REVIEW OF SYSTEMS  CONSTITUTIONAL: No fever reported.  CARDIOVASCULAR: No chest pain reported.  PULMONARY: No trouble breathing reported.     PHYSICAL EXAM  Vitals:    08/17/18 1052   BP: 124/86   Pulse: 87   Temp: 97.2 °F (36.2 °C)   TempSrc: Tympanic   SpO2: 100%   Weight: 105.3 kg (232 lb 2.3 oz)   Height: 5' 6" (1.676 m)     CONSTITUTIONAL: Vital signs noted. No apparent distress. Does not appear acutely ill or septic. Appears adequately hydrated.  HEENT: External ENT grossly unremarkable. Hearing grossly intact. Oropharynx moist.  PULM: Lungs clear. Breathing unlabored.  HEART: Auscultation reveals regular rate and rhythm without murmur, gallop or rub.  DERM: Skin warm and moist with normal turgor.  NEURO: There are no gross focal motor deficits or gross deficits of cranial nerves III-XII.  PSYCHIATRIC: Alert and oriented x 3. Mood is grossly neutral. Affect appropriate. Judgment and insight not grossly compromised.  MUSCULOSKELETAL: Grossly normal stance and gait.     ASSESSMENT and PLAN  Benign essential hypertension  -     chlorthalidone (HYGROTEN) 25 MG Tab; Take 1 tablet (25 mg total) by mouth once daily.  Dispense: 90 tablet; Refill: 3  -     Basic metabolic panel; Future; Expected date: 02/17/2019  -     Basic metabolic panel; Future; Expected date: 11/17/2018    Encounter for long-term current use of medication  -     Basic metabolic panel; Future; Expected date: 02/17/2019  -     Basic metabolic panel; Future; Expected date: 11/17/2018    Iron deficiency anemia due to chronic blood loss    Class 2 severe obesity due to excess calories with serious comorbidity and body mass index (BMI) of 38.0 to 38.9 in adult    Gastro-esophageal reflux disease without esophagitis    Other fatigue        PRESCRIPTION MEDICATION MANAGEMENT  Except as noted below, CURRENT " "MEDICATIONS are to remain unchanged from that listed above.  Medications Ordered This Encounter   Medications    chlorthalidone (HYGROTEN) 25 MG Tab     Sig: Take 1 tablet (25 mg total) by mouth once daily.     Dispense:  90 tablet     Refill:  3     Medications Discontinued During This Encounter   Medication Reason    chlorthalidone (HYGROTEN) 25 MG Tab Reorder       Follow-up in about 10 months (around 6/17/2019) for re-evaluate problem(s) discussed today.    Patient Instructions   You can get help with [facility] and [facility]:  · online at [email or web address]  · by email at  [email or web address]  · or by phone at [phone]      [facility] has a very good comprehensive weight loss program and bariatric surgery program. To learn more:    COMPREHENSIVE WEIGHT LOSS PROGRAM - Phone [phone]     [email or web address]    BARIATRIC SURGERY PROGRAM - Phone [phone]     [email or web address]    You should be able to schedule your initial appointment without a formal referral from me. If you are told that you need a referral from your primary care physician, just let me know, and I'll be happy to provide this.      ABOUT THIS DOCUMENTATION:  · The order of the conditions listed in the HPI is one of convenience and does not necessarily reflect the chronology of the appointment, nor the relative importance of a condition.  · Documentation entered by me for this encounter was done in part using speech-recognition technology. Although I have made an effort to ensure accuracy, "sound like" errors may exist and should be interpreted in context.                        -DAISY Leigh MD  "

## 2018-08-20 ENCOUNTER — PROCEDURE VISIT (OUTPATIENT)
Dept: SLEEP MEDICINE | Facility: CLINIC | Age: 43
End: 2018-08-20
Payer: COMMERCIAL

## 2018-08-20 DIAGNOSIS — R06.83 PRIMARY SNORING: ICD-10-CM

## 2018-08-20 PROCEDURE — 99499 UNLISTED E&M SERVICE: CPT | Mod: S$GLB,,, | Performed by: INTERNAL MEDICINE

## 2018-08-20 PROCEDURE — 95806 SLEEP STUDY UNATT&RESP EFFT: CPT | Mod: S$GLB,,, | Performed by: INTERNAL MEDICINE

## 2018-08-20 NOTE — Clinical Note
Assessment and RecommendationsTwo night protocol was utilized. Data on the 2nd night was adequate 6 hr 56 min.Saturation nazario was 95%. Average heart rate was 84 beats per minute. Snoring was recorded by 50decibels 100% of the time.Apnea-hypopnea index: AHI: 1.2 events per hour total events 8Primary snoring detectedTreatment recommendations:Interventions for primary snoring may be considered including ENT evaluation, weight loss, nasal theravent.If clinical suspicion for sleep disorder breathing obstructive sleep apnea is high then repeat study with in-labpolysomnography.Arrange appointment in [facility] Disorder Clinic for evaluation and testing.

## 2018-08-20 NOTE — PROGRESS NOTES
Assessment and Recommendations  Two night protocol was utilized. Data on the 2nd night was adequate 6 hr 56 min.  Saturation nazario was 95%. Average heart rate was 84 beats per minute. Snoring was recorded by 50  decibels 100% of the time.  Apnea-hypopnea index: AHI: 1.2 events per hour total events 8  Primary snoring detected  Treatment recommendations:  Interventions for primary snoring may be considered including ENT evaluation, weight loss, nasal theravent.  If clinical suspicion for sleep disorder breathing obstructive sleep apnea is high then repeat study with in-lab  polysomnography.  Arrange appointment in [facility] Disorder Clinic for evaluation and testing.

## 2018-08-20 NOTE — PROCEDURES
Home Sleep Studies  Date/Time: 8/20/2018 9:21 AM  Performed by: Cristofer Zheng MD  Authorized by: DAISY Leigh MD       Assessment and Recommendations  Two night protocol was utilized. Data on the 2nd night was adequate 6 hr 56 min.  Saturation nazario was 95%. Average heart rate was 84 beats per minute. Snoring was recorded by 50  decibels 100% of the time.  Apnea-hypopnea index: AHI: 1.2 events per hour total events 8  Primary snoring detected  Treatment recommendations:  Interventions for primary snoring may be considered including ENT evaluation, weight loss, nasal theravent.  If clinical suspicion for sleep disorder breathing obstructive sleep apnea is high then repeat study with in-lab  polysomnography.  Arrange appointment in Sleep Disorder Clinic for evaluation and testing.

## 2018-08-23 ENCOUNTER — PATIENT MESSAGE (OUTPATIENT)
Dept: OBSTETRICS AND GYNECOLOGY | Facility: CLINIC | Age: 43
End: 2018-08-23

## 2018-08-23 NOTE — PROGRESS NOTES
Adeel Araujo.I am happy to report that your recent breast imaging did NOT show evidence of cancer.An annual mammogram is the best test to screen for breast cancer, but it is not perfect, and it can miss some cancers. So, even though your mammogram was normal, if you notice any lump or change in one of your breasts, please schedule an appointment with me for a proper evaluation.Thank you for letting me care for you. I look forward to seeing you again.Sincerely,DAISY Leigh MD

## 2018-08-23 NOTE — PROGRESS NOTES
Hi, Adeel.    I'm proud of you for getting your Pap test done, and I'm happy that it did NOT show evidence of cancer.    To learn more about the prevention and early detection of cervical cancer, visit [email or web address]    Thanks for letting me care for you.    Sincerely,    DAISY Leigh MD

## 2018-09-05 ENCOUNTER — PATIENT MESSAGE (OUTPATIENT)
Dept: INTERNAL MEDICINE | Facility: CLINIC | Age: 43
End: 2018-09-05

## 2019-08-13 ENCOUNTER — TELEPHONE (OUTPATIENT)
Dept: INTERNAL MEDICINE | Facility: CLINIC | Age: 44
End: 2019-08-13

## 2019-08-13 DIAGNOSIS — I10 BENIGN ESSENTIAL HYPERTENSION: ICD-10-CM

## 2019-08-14 RX ORDER — AMLODIPINE AND BENAZEPRIL HYDROCHLORIDE 10; 20 MG/1; MG/1
1 CAPSULE ORAL DAILY
Qty: 90 CAPSULE | Refills: 0 | Status: SHIPPED | OUTPATIENT
Start: 2019-08-14 | End: 2019-09-13 | Stop reason: ALTCHOICE

## 2019-08-14 NOTE — TELEPHONE ENCOUNTER
Please notify her of one time refill given and schedule her for follow-up preventive services appointment any time within the next two months.  Thanks.    Requested Prescriptions     Signed Prescriptions Disp Refills    amlodipine-benazepril 10-20mg (LOTREL) 10-20 mg per capsule 90 capsule 0     Sig: Take 1 capsule by mouth once daily. - APPOINTMENT REQUIRED FOR MORE REFILLS     Authorizing Provider: DAISY ANDERSON

## 2019-09-09 ENCOUNTER — TELEPHONE (OUTPATIENT)
Dept: INTERNAL MEDICINE | Facility: CLINIC | Age: 44
End: 2019-09-09

## 2019-09-09 DIAGNOSIS — Z12.31 ENCOUNTER FOR SCREENING MAMMOGRAM FOR BREAST CANCER: Primary | ICD-10-CM

## 2019-09-09 NOTE — TELEPHONE ENCOUNTER
Spoke with patient and entered the order and scheduled her screening mammogram for this year. She verbalized understanding.

## 2019-09-09 NOTE — TELEPHONE ENCOUNTER
----- Message from Marlon Sutherland sent at 9/9/2019  9:34 AM CDT -----  Contact: self- [phone]  .Type:  Mammogram  Caller is requesting to schedule their annual mammogram appointment.  Order is not listed in EPIC.  Please enter order and contact patient to schedule.  Name of Caller:Adeel Gordon,  Where would they like the mammogram performed?[facility]   Would the patient rather a call back or a response via [facility]? Call back   Best Call Back Number:.[phone] (home)     Additional Information:

## 2019-09-13 ENCOUNTER — HOSPITAL ENCOUNTER (OUTPATIENT)
Dept: RADIOLOGY | Facility: HOSPITAL | Age: 44
Discharge: HOME | End: 2019-09-13
Attending: FAMILY MEDICINE
Payer: MEDICAID

## 2019-09-13 ENCOUNTER — OFFICE VISIT (OUTPATIENT)
Dept: INTERNAL MEDICINE | Facility: CLINIC | Age: 44
End: 2019-09-13
Payer: MEDICAID

## 2019-09-13 VITALS
TEMPERATURE: 99 F | SYSTOLIC BLOOD PRESSURE: 122 MMHG | HEIGHT: 66 IN | DIASTOLIC BLOOD PRESSURE: 70 MMHG | OXYGEN SATURATION: 95 % | HEART RATE: 88 BPM | WEIGHT: 215.63 LBS | BODY MASS INDEX: 34.65 KG/M2

## 2019-09-13 DIAGNOSIS — I10 BENIGN ESSENTIAL HYPERTENSION: Primary | ICD-10-CM

## 2019-09-13 DIAGNOSIS — Z11.3 ROUTINE SCREENING FOR STI (SEXUALLY TRANSMITTED INFECTION): ICD-10-CM

## 2019-09-13 DIAGNOSIS — D50.0 IRON DEFICIENCY ANEMIA DUE TO CHRONIC BLOOD LOSS: ICD-10-CM

## 2019-09-13 DIAGNOSIS — Z12.31 ENCOUNTER FOR SCREENING MAMMOGRAM FOR BREAST CANCER: ICD-10-CM

## 2019-09-13 DIAGNOSIS — Z11.4 SCREENING FOR HIV WITHOUT PRESENCE OF RISK FACTORS: ICD-10-CM

## 2019-09-13 DIAGNOSIS — E78.2 MIXED DYSLIPIDEMIA: ICD-10-CM

## 2019-09-13 DIAGNOSIS — E66.09 CLASS 1 OBESITY DUE TO EXCESS CALORIES WITH SERIOUS COMORBIDITY AND BODY MASS INDEX (BMI) OF 34.0 TO 34.9 IN ADULT: Chronic | ICD-10-CM

## 2019-09-13 DIAGNOSIS — R53.83 OTHER FATIGUE: ICD-10-CM

## 2019-09-13 PROCEDURE — 99999 PR PBB SHADOW E&M-EST. PATIENT-LVL III: CPT | Mod: PBBFAC,,, | Performed by: FAMILY MEDICINE

## 2019-09-13 PROCEDURE — 77067 MAMMO DIGITAL SCREENING BILAT WITH TOMOSYNTHESIS_CAD: ICD-10-PCS | Mod: 26,,, | Performed by: RADIOLOGY

## 2019-09-13 PROCEDURE — 77063 MAMMO DIGITAL SCREENING BILAT WITH TOMOSYNTHESIS_CAD: ICD-10-PCS | Mod: 26,,, | Performed by: RADIOLOGY

## 2019-09-13 PROCEDURE — 99214 PR OFFICE/OUTPT VISIT, EST, LEVL IV, 30-39 MIN: ICD-10-PCS | Mod: S$PBB,,, | Performed by: FAMILY MEDICINE

## 2019-09-13 PROCEDURE — 99214 OFFICE O/P EST MOD 30 MIN: CPT | Mod: S$PBB,,, | Performed by: FAMILY MEDICINE

## 2019-09-13 PROCEDURE — 77063 BREAST TOMOSYNTHESIS BI: CPT | Mod: 26,,, | Performed by: RADIOLOGY

## 2019-09-13 PROCEDURE — 77067 SCR MAMMO BI INCL CAD: CPT | Mod: 26,,, | Performed by: RADIOLOGY

## 2019-09-13 PROCEDURE — 77067 SCR MAMMO BI INCL CAD: CPT | Mod: TC

## 2019-09-13 PROCEDURE — 99213 OFFICE O/P EST LOW 20 MIN: CPT | Mod: PBBFAC | Performed by: FAMILY MEDICINE

## 2019-09-13 PROCEDURE — 99999 PR PBB SHADOW E&M-EST. PATIENT-LVL III: ICD-10-PCS | Mod: PBBFAC,,, | Performed by: FAMILY MEDICINE

## 2019-09-13 RX ORDER — AMLODIPINE AND VALSARTAN 10; 320 MG/1; MG/1
1 TABLET ORAL DAILY
Qty: 90 TABLET | Refills: 3 | Status: SHIPPED | OUTPATIENT
Start: 2019-09-13 | End: 2020-09-23 | Stop reason: SDUPTHER

## 2019-09-13 NOTE — PATIENT INSTRUCTIONS
If you need help with [facility] [facility] and [facility], help is available:  · online at [email or web address]   at the O-bar in the 1st floor lobby of [facility]  · or by phone at [phone]  · by email at  [email or web address]"    Here's a link to a [employer] video on how to send a message to your provider using [facility] My[facility] leeann.  [email or web address]/[email or web address]    Here's a link to a [employer] video on how to schedule an appointment using [facility] leeann.  [email or web address]

## 2019-09-13 NOTE — PROGRESS NOTES
CHIEF COMPLAINT  Follow-up      HISTORY, ASSESSMENT, and PLAN    -Benign essential hypertension        ASSESSMENT:  Controlled.    -Iron deficiency anemia due to chronic blood loss  -Other fatigue        ASSESSMENT:  No bleeding problems reported. She is due for follow-up labs regarding this problem.     -Class 1 obesity due to excess calories with serious comorbidity and body mass index (BMI) of 34.0 to 34.9 in adult        ASSESSMENT:  Improved     -Mixed dyslipidemia        ASSESSMENT:  She is due for follow-up labs regarding this problem.     -Screening for HIV without presence of risk factors   -Routine screening for STI (sexually transmitted infection)       ASSESSMENT: She reports no specific exposure or risk factor or specific relevant symptoms, but she accepted offer for screening.       Orders Placed This Encounter    C. trachomatis/N. gonorrhoeae by AMP DNA [facility]; Urine    Lipid panel    Comprehensive metabolic panel    HIV 1/2 Ag/Ab (4th Gen)    RPR    CBC auto differential    Ferritin    Iron and TIBC    TSH    Hypertension Digital Medicine (HDMP) Enrollment Order    amlodipine-valsartan (EXFORGE)  mg per tablet    [facility] Assign Onboarding Questionnaires       Problem List Items Addressed This Visit        Cardiac/Vascular    Benign essential hypertension - Primary    Relevant Medications    amlodipine-valsartan (EXFORGE)  mg per tablet    Other Relevant Orders    Comprehensive metabolic panel (Completed)    TSH (Completed)    Hypertension Digital Medicine (HDMP) Enrollment Order (Completed)    Hypertension Digital Medicine (HDMP): Assign Onboarding Questionnaires (Completed)       Oncology    Iron deficiency anemia due to chronic blood loss    Relevant Orders    CBC auto differential (Completed)    Ferritin (Completed)    Iron and TIBC (Completed)       Endocrine    Class 1 obesity due to excess calories with serious comorbidity and body mass  index (BMI) of 34.0 to 34.9 in adult (Chronic)       Other    Other fatigue    Relevant Orders    Comprehensive metabolic panel (Completed)    CBC auto differential (Completed)    TSH (Completed)      Other Visit Diagnoses     Mixed dyslipidemia        Relevant Orders    Lipid panel (Completed)    Comprehensive metabolic panel (Completed)    TSH (Completed)    Screening for HIV without presence of risk factors        Relevant Orders    HIV 1/2 Ag/Ab (4th Gen) (Completed)    Routine screening for STI (sexually transmitted infection)        Relevant Orders    C. trachomatis/N. gonorrhoeae by AMP DNA [facility]; Urine (Completed)    RPR (Completed)           Outpatient Medications Prior to Visit   Medication Sig Dispense Refill    amlodipine-benazepril 10-20mg (LOTREL) 10-20 mg per capsule Take 1 capsule by mouth once daily. - APPOINTMENT REQUIRED FOR MORE REFILLS 90 capsule 0    chlorthalidone (HYGROTEN) 25 MG Tab Take 1 tablet (25 mg total) by mouth once daily. 90 tablet 3    ferrous sulfate 325 mg (65 mg iron) Tab tablet Take 1 tablet (325 mg total) by mouth 2 (two) times daily. 60 tablet 3    pantoprazole (PROTONIX) 40 MG tablet Take 1 tablet (40 mg total) by mouth once daily. 90 tablet 3     No facility-administered medications prior to visit.         Medications Ordered This Encounter   Medications    amlodipine-valsartan (EXFORGE)  mg per tablet     Sig: Take 1 tablet by mouth once daily.     Dispense:  90 tablet     Refill:  3     IMPORTANT!  This REPLACES amlodipine-lisinopril. DISCONTINUE any existing prescription for amlodipine-lisinopril.       Medications Discontinued During This Encounter   Medication Reason    pantoprazole (PROTONIX) 40 MG tablet Patient no longer taking    ferrous sulfate 325 mg (65 mg iron) Tab tablet Patient no longer taking    chlorthalidone (HYGROTEN) 25 MG Tab Patient no longer taking    amlodipine-benazepril 10-20mg (LOTREL) 10-20 mg per capsule Alternate therapy     "    Follow up in about 1 month (around 10/13/2019) for review test results and discuss treatment plan, re-evaluate problem(s) discussed today.    REVIEW OF SYSTEMS  CARDIOVASCULAR: No angina or orthopnea reported.   ENDOCRINE: No polyuria or polydipsia reported.     PHYSICAL EXAM  Vitals:    09/13/19 0958 09/13/19 1005   BP: (!) 140/100 122/70   BP Location: Left arm Left arm   Patient Position: Sitting Sitting   BP Method: Large (Manual) Large (Manual)   Pulse: 88    Temp: 98.5 °F (36.9 °C)    TempSrc: Tympanic    SpO2: 95%    Weight: 97.8 kg (215 lb 9.8 oz)    Height: 5' 6" (1.676 m)      CONSTITUTIONAL: Vital signs noted. No apparent distress. Does not appear acutely ill or septic. Appears adequately hydrated.  HEENT: External ENT grossly unremarkable. Hearing grossly intact. Oropharynx moist.  PULM: Lungs clear. Breathing unlabored.  HEART: Auscultation reveals regular rate and rhythm without murmur, gallop or rub.  DERM: Skin warm and moist with normal turgor.  NEURO: There are no gross focal motor deficits or gross deficits of cranial nerves III-XII.  PSYCHIATRIC: Alert and conversant. Mood grossly neutral. Judgment and insight not grossly compromised.     Documentation entered by me for this encounter may have been done in part using speech-recognition technology. Although I have made an effort to ensure accuracy, "sound like" errors may exist and should be interpreted in context. -DAISY Leigh MD.   "

## 2019-09-16 NOTE — PROGRESS NOTES
"Adeel Araujo.      I'm happy to report that these test results are NORMAL or at least ACCEPTABLE.    Want to learn more about your test results and what they mean? It's as simple as 1, 2, 3.     (1) Log in to your [facility] account at [email or web address]     (2) From the "View test results" tab, click on the test you want to know more about.     (3) Click on the "About This Test" link.    We can discuss your test results further at your next appointment.  If you have any questions, be sure to write them down and bring them to your appointment.  If you have any urgent questions in the meantime, please send me a message using [facility], or you can call my office at [phone].    Thanks for letting me care for you, thanks for trusting us with your healthcare needs, and thanks for using [facility].    Sincerely,    DAISY Leigh MD"

## 2019-10-18 ENCOUNTER — OFFICE VISIT (OUTPATIENT)
Dept: INTERNAL MEDICINE | Facility: CLINIC | Age: 44
End: 2019-10-18
Payer: MEDICAID

## 2019-10-18 VITALS
BODY MASS INDEX: 36 KG/M2 | SYSTOLIC BLOOD PRESSURE: 138 MMHG | HEIGHT: 66 IN | WEIGHT: 224 LBS | TEMPERATURE: 98 F | DIASTOLIC BLOOD PRESSURE: 88 MMHG | HEART RATE: 95 BPM | OXYGEN SATURATION: 99 %

## 2019-10-18 DIAGNOSIS — D50.0 IRON DEFICIENCY ANEMIA DUE TO CHRONIC BLOOD LOSS: ICD-10-CM

## 2019-10-18 DIAGNOSIS — I10 BENIGN ESSENTIAL HYPERTENSION: Primary | ICD-10-CM

## 2019-10-18 DIAGNOSIS — E66.01 SEVERE OBESITY WITH BODY MASS INDEX (BMI) OF 36.0 TO 36.9 WITH SERIOUS COMORBIDITY: Chronic | ICD-10-CM

## 2019-10-18 PROCEDURE — 99214 OFFICE O/P EST MOD 30 MIN: CPT | Mod: S$PBB,,, | Performed by: FAMILY MEDICINE

## 2019-10-18 PROCEDURE — 99999 PR PBB SHADOW E&M-EST. PATIENT-LVL III: ICD-10-PCS | Mod: PBBFAC,,, | Performed by: FAMILY MEDICINE

## 2019-10-18 PROCEDURE — 99999 PR PBB SHADOW E&M-EST. PATIENT-LVL III: CPT | Mod: PBBFAC,,, | Performed by: FAMILY MEDICINE

## 2019-10-18 PROCEDURE — 99213 OFFICE O/P EST LOW 20 MIN: CPT | Mod: PBBFAC | Performed by: FAMILY MEDICINE

## 2019-10-18 PROCEDURE — 99214 PR OFFICE/OUTPT VISIT, EST, LEVL IV, 30-39 MIN: ICD-10-PCS | Mod: S$PBB,,, | Performed by: FAMILY MEDICINE

## 2019-10-18 NOTE — PATIENT INSTRUCTIONS
Amlodipine; Benazepril capsules  What is this medicine?  AMLODIPINE; BENAZEPRIL (am HOLA di peen; elizabet AY ze pril) is a combination of two drugs. It is used to treat high blood pressure.  How should I use this medicine?  Take this medicine by mouth with a glass of water. Follow the directions on the prescription label. You can take the capsules with or without food. Take your doses at regular intervals. Do not take your medicine more often then directed. Do not stop taking except on the advice of your doctor or health care professional.  Talk to your pediatrician regarding the use of this medicine in children. Special care may be needed.  What side effects may I notice from receiving this medicine?  Side effects that you should report to your doctor or health care professional as soon as possible:  · allergic reactions like skin rash, itching or hives, swelling of the face, lips, or tongue  · breathing problems  · chest pain  · dry cough  · fast, irregular heartbeat  · redness, blistering, peeling or loosening of the skin, including inside the mouth  · stomach pain  · swelling of your ankles, legs  · trouble passing urine or change in the amount of urine  Side effects that usually do not require medical attention (report to your doctor or health care professional if they continue or are bothersome):  · drowsiness or dizziness  · facial flushing  · headache  What may interact with this medicine?  · diuretics, especially amiloride, triamterene, or spironolactone  · everolimus  · lithium  · medicines for blood pressure  · potassium salts or potassium supplements  · sirolimus  · temsirolimus  What if I miss a dose?  If you miss a dose, take it as soon as you can. If it is almost time for your next dose, take only that dose. Do not take double or extra doses.  Where should I keep my medicine?  Keep out of the reach of children.  Store at room temperature between 15 and 30 degrees C (59 and 86 degrees F). Protect from  moisture. Keep container tightly closed. Throw away any unused medicine after the expiration date.  What should I tell my health care provider before I take this medicine?  They need to know if you have any of these conditions:  · bone marrow disease  · heart or blood vessel disease  · if you are on a special diet, such as a low-salt diet  · immune system disease or disorder, like lupus  · kidney or liver disease  · previous swelling of the tongue, face, or lips with difficulty breathing, difficulty swallowing, hoarseness, or tightening of the throat  · an unusual or allergic reaction to amlodipine, benazepril, other medicines, insect venom, foods, dyes, or preservatives  · pregnant or trying to get pregnant  · breast-feeding  What should I watch for while using this medicine?  Visit your doctor or health care professional for regular checks on your progress. Check your blood pressure as directed. Ask your doctor or health care professional what your blood pressure should be and when you should contact him or her. Call your doctor or health care professional if you notice an irregular or fast heart beat.  You may get drowsy or dizzy. Do not drive, use machinery, or do anything that needs mental alertness until you know how this drug affects you. Do not stand or sit up quickly, especially if you are an older patient. This reduces the risk of dizzy or fainting spells. Alcohol can make you more drowsy and dizzy. Avoid alcoholic drinks.  If you are going to have surgery, tell your doctor or health care professional that you are taking this medicine.  Women should inform their doctor if they wish to become pregnant or think they might be pregnant. There is a potential for serious side effects to an unborn child. Talk to your health care professional or pharmacist for more information.  Check with your doctor or health care professional if you get an attack of severe diarrhea, nausea and vomiting, or if you sweat a lot.  The loss of body fluid can make it dangerous to take this medicine.  A few patients have had strong allergic reactions during desensitization treatment with hymenoptera venom and during some kinds of dialysis. Talk to your doctor if you are going to have either of these procedures.  Avoid salt substitutes unless you are told otherwise by your doctor or health care professional.  Do not treat yourself for coughs, colds, or pain while you are taking this medicine without asking your doctor or health care professional for advice. Some ingredients may increase your blood pressure.  NOTE:This sheet is a summary. It may not cover all possible information. If you have questions about this medicine, talk to your doctor, pharmacist, or health care provider. Copyright© 2017 [employer]        Amlodipine; Valsartan oral tablet  What is this medicine?  AMLODIPINE; VALSARTAN (am HOLA di peen; daniel CHANNING tan) is a combination of a calcium channel blocker and an angiotensin II antagonist. This medicine is used to treat high blood pressure.  How should I use this medicine?  Take this medicine by mouth with a glass of water. Follow the directions on the prescription label. Take with or without food. Take your medicine at regular intervals. Do not take it more often than directed. Do not stop taking except on your doctor's advice.  Talk to your pediatrician regarding the use of this medicine in children. Special care may be needed.  Patients over 65 years old may have a stronger reaction and need a smaller dose.  What side effects may I notice from receiving this medicine?  Side effects that you should report to your doctor or health care professional as soon as possible:  · allergic reactions like skin rash, itching or hives, swelling of the face, lips, or tongue  · breathing problems  · chest pain  · confusion  · fast, irregular heartbeat  · feeling faint or lightheaded, falls  · low blood pressure  · swelling of the hands, ankles,  or feet  · trouble passing urine or change in the amount of urine  Side effects that usually do not require medical attention (report to your prescriber or health care professional if they continue or are bothersome):  · change in sex drive or performance  · cough  · diarrhea  · flushing of face, skin  · headache  · nausea, vomiting  · stomach gas, pain  · weak or tired  What may interact with this medicine?  · ACE inhibitors like captopril, enalapril, or lisinopril  · aliskiren  · angiotensin II blockers (ARBs) like candesartan, losartan, or telmisartan  · cyclosporine  · heparin  · lithium  · NSAIDS, medicines for pain and inflammation, like ibuprofen or naproxen  · potassium supplements  · rifampin  · ritonavir  · salt substitutes  · sildenafil  · simvastatin  · some diuretics like amiloride, spironolactone, triamterene  · tacrolimus  What if I miss a dose?  If you miss a dose, take it as soon as you can. If it is almost time for your next dose, take only that dose. Do not take double or extra doses.  Where should I keep my medicine?  Keep out of the reach of children.  Store at room temperature between 15 and 30 degrees C (59 and 86 degrees F). Protect from moisture. Throw away any unused medicine after the expiration date.  What should I tell my health care provider before I take this medicine?  They need to know if you have any of these conditions:  · heart failure, recent heart attack, or other heart problems  · kidney disease  · liver disease  · an unusual or allergic reaction to amlodipine, valsartan, other medicines, foods, dyes, or preservatives  · pregnant or trying to get pregnant  · breast-feeding  What should I watch for while using this medicine?  Visit your doctor or health care professional for regular checks on your progress. Check your blood pressure as directed. Ask your doctor or health care professional what your blood pressure should be and when you should contact him or her.  Women should  inform their doctor if they wish to become pregnant or think they might be pregnant. There is a potential for serious side effects to an unborn child. Talk to your health care professional or pharmacist for more information.  You may get drowsy or dizzy. Do not drive, use machinery, or do anything that needs mental alertness until you know how this medicine affects you. Do not stand or sit up quickly, especially if you are an older patient. This reduces the risk of dizzy or fainting spells. Alcohol may interfere with the effect of this medicine. Avoid alcoholic drinks.  If you are going to have surgery, tell your prescriber or health care professional that you are taking this medicine.  NOTE:This sheet is a summary. It may not cover all possible information. If you have questions about this medicine, talk to your doctor, pharmacist, or health care provider. Copyright© 2017 [employer]      TAKE AS DIRECTED:

## 2019-10-18 NOTE — ASSESSMENT & PLAN NOTE
Wt Readings from Last 6 Encounters:   10/18/19 101.6 kg (223 lb 15.8 oz)   09/13/19 97.8 kg (215 lb 9.8 oz)   08/17/18 105.3 kg (232 lb 2.3 oz)   08/17/18 105.3 kg (232 lb 0.6 oz)   07/13/18 107.3 kg (236 lb 8.9 oz)   09/20/17 99.6 kg (219 lb 9.3 oz)     BMI Readings from Last 2 Encounters:   10/18/19 36.15 kg/m²   09/13/19 34.80 kg/m²     Worse. Exogenous. Therapeutic lifestyle changes encouraged.

## 2019-10-18 NOTE — ASSESSMENT & PLAN NOTE
Lab Results   Component Value Date    HGB 12.8 09/13/2019    HGB 14.0 07/13/2018    HCT 37.4 09/13/2019    HCT 43.5 07/13/2018     (H) 09/13/2019     07/13/2018     Lab Results   Component Value Date     (H) 09/13/2019    MCH 41.8 (H) 09/13/2019    MCHC 34.2 09/13/2019    RDW 12.7 09/13/2019     Lab Results   Component Value Date    FERRITIN 177 09/13/2019    FERRITIN 66 07/13/2018    FERRITIN 7 (L) 11/10/2016    IRON 106 09/13/2019    IRON 105 07/13/2018    TRANSFERRIN 213 09/13/2019    TRANSFERRIN 227 07/13/2018    TIBC 315 09/13/2019    TIBC 336 07/13/2018    FESATURATED 34 09/13/2019    FESATURATED 31 07/13/2018     Controlled, improved. PLAN: OTC iron supplement.

## 2019-11-03 NOTE — ASSESSMENT & PLAN NOTE
BP Readings from Last 6 Encounters:   10/18/19 138/88   09/13/19 122/70   08/17/18 124/86   08/17/18 124/86   07/13/18 (!) 144/96   09/20/17 126/88     Lab Results   Component Value Date    ESTGFRAFRICA >60.0 09/13/2019    ESTGFRAFRICA >60.0 09/13/2019    ESTGFRAFRICA >60.0 09/13/2019    EGFRNONAA >60.0 09/13/2019    EGFRNONAA >60.0 09/13/2019    EGFRNONAA >60.0 09/13/2019    CREATININE 0.8 09/13/2019    CREATININE 0.8 09/13/2019    CREATININE 0.8 09/13/2019    BUN 11 09/13/2019    BUN 11 09/13/2019    BUN 11 09/13/2019    K 3.6 09/13/2019    K 3.6 09/13/2019    K 3.6 09/13/2019     09/13/2019     09/13/2019     09/13/2019     09/13/2019     09/13/2019     09/13/2019     Controlled, stable. Therapeutic lifestyle changes encouraged.

## 2019-11-03 NOTE — PROGRESS NOTES
CHIEF COMPLAINT  Follow-up      HISTORY, ASSESSMENT, and PLAN    1. Benign essential hypertension    2. Severe obesity with body mass index (BMI) of 36.0 to 36.9 with serious comorbidity    3. Iron deficiency anemia due to chronic blood loss        Orders Placed This Encounter    [facility] ([facility]) Enrollment Order       Problem List Items Addressed This Visit        Cardiac/Vascular    Benign essential hypertension - Primary    Current Assessment & Plan     BP Readings from Last 6 Encounters:   10/18/19 138/88   09/13/19 122/70   08/17/18 124/86   08/17/18 124/86   07/13/18 (!) 144/96   09/20/17 126/88     Lab Results   Component Value Date    ESTGFRAFRICA >60.0 09/13/2019    ESTGFRAFRICA >60.0 09/13/2019    ESTGFRAFRICA >60.0 09/13/2019    EGFRNONAA >60.0 09/13/2019    EGFRNONAA >60.0 09/13/2019    EGFRNONAA >60.0 09/13/2019    CREATININE 0.8 09/13/2019    CREATININE 0.8 09/13/2019    CREATININE 0.8 09/13/2019    BUN 11 09/13/2019    BUN 11 09/13/2019    BUN 11 09/13/2019    K 3.6 09/13/2019    K 3.6 09/13/2019    K 3.6 09/13/2019     09/13/2019     09/13/2019     09/13/2019     09/13/2019     09/13/2019     09/13/2019     Controlled, stable. Therapeutic lifestyle changes encouraged.          Relevant Orders    [facility] ([facility]) Enrollment Order (Completed)       Oncology    Iron deficiency anemia due to chronic blood loss    Current Assessment & Plan     Lab Results   Component Value Date    HGB 12.8 09/13/2019    HGB 14.0 07/13/2018    HCT 37.4 09/13/2019    HCT 43.5 07/13/2018     (H) 09/13/2019     07/13/2018     Lab Results   Component Value Date     (H) 09/13/2019    MCH 41.8 (H) 09/13/2019    MCHC 34.2 09/13/2019    RDW 12.7 09/13/2019     Lab Results   Component Value Date    FERRITIN 177 09/13/2019    FERRITIN 66 07/13/2018    FERRITIN 7 (L) 11/10/2016    IRON 106 09/13/2019    IRON 105 07/13/2018    TRANSFERRIN  "213 09/13/2019    TRANSFERRIN 227 07/13/2018    TIBC 315 09/13/2019    TIBC 336 07/13/2018    FESATURATED 34 09/13/2019    FESATURATED 31 07/13/2018     Controlled, improved. PLAN: OTC iron supplement.            Other    Severe obesity with body mass index (BMI) of 36.0 to 36.9 with serious comorbidity (Chronic)    Current Assessment & Plan     Wt Readings from Last 6 Encounters:   10/18/19 101.6 kg (223 lb 15.8 oz)   09/13/19 97.8 kg (215 lb 9.8 oz)   08/17/18 105.3 kg (232 lb 2.3 oz)   08/17/18 105.3 kg (232 lb 0.6 oz)   07/13/18 107.3 kg (236 lb 8.9 oz)   09/20/17 99.6 kg (219 lb 9.3 oz)     BMI Readings from Last 2 Encounters:   10/18/19 36.15 kg/m²   09/13/19 34.80 kg/m²     Worse. Exogenous. Therapeutic lifestyle changes encouraged.                 Outpatient Medications Prior to Visit   Medication Sig Dispense Refill    amlodipine-valsartan (EXFORGE)  mg per tablet Take 1 tablet by mouth once daily. 90 tablet 3     No facility-administered medications prior to visit.         There are no discontinued medications.     Follow up for wellness and preventive services.    REVIEW OF SYSTEMS  HEMATOLOGIC: No bleeding problems reported.   CARDIOVASCULAR: No angina or orthopnea reported.  ENDOCRINE: No polyuria or polydipsia reported.     PHYSICAL EXAM  Vitals:    10/18/19 0934   BP: 138/88   BP Location: Left arm   Patient Position: Sitting   BP Method: Large (Automatic)   Pulse: 95   Temp: 98.2 °F (36.8 °C)   TempSrc: Oral   SpO2: 99%   Weight: 101.6 kg (223 lb 15.8 oz)   Height: 5' 6" (1.676 m)     CONSTITUTIONAL: Vital signs noted. No apparent distress. Does not appear acutely ill or septic. Appears adequately hydrated.  HEENT: External ENT grossly unremarkable. Hearing grossly intact. Oropharynx moist.  PULM: Lungs clear. Breathing unlabored.  HEART: Auscultation reveals regular rate and rhythm without murmur, gallop or rub.  DERM: Skin warm and moist with normal turgor.  NEURO: There are no gross focal " "motor deficits or gross deficits of cranial nerves III-XII.  PSYCHIATRIC: Alert and conversant. Mood grossly neutral. Judgment and insight not grossly compromised.        Documentation entered by me for this encounter may have been done in part using speech-recognition technology. Although I have made an effort to ensure accuracy, "sound like" errors may exist and should be interpreted in context. -DAISY Leigh MD.   "

## 2020-01-07 ENCOUNTER — PATIENT MESSAGE (OUTPATIENT)
Dept: ADMINISTRATIVE | Facility: OTHER | Age: 45
End: 2020-01-07

## 2020-09-23 DIAGNOSIS — Z11.59 ENCOUNTER FOR HEPATITIS C SCREENING TEST FOR LOW RISK PATIENT: ICD-10-CM

## 2020-09-23 DIAGNOSIS — I10 BENIGN ESSENTIAL HYPERTENSION: ICD-10-CM

## 2020-09-23 DIAGNOSIS — D75.839 THROMBOCYTOSIS: Primary | ICD-10-CM

## 2020-09-23 DIAGNOSIS — E78.2 MODERATE MIXED HYPERLIPIDEMIA NOT REQUIRING STATIN THERAPY: ICD-10-CM

## 2020-09-23 DIAGNOSIS — Z12.31 BREAST CANCER SCREENING BY MAMMOGRAM: ICD-10-CM

## 2020-09-29 RX ORDER — AMLODIPINE AND VALSARTAN 10; 320 MG/1; MG/1
1 TABLET ORAL DAILY
Qty: 90 TABLET | Refills: 0 | Status: SHIPPED | OUTPATIENT
Start: 2020-09-29 | End: 2020-10-22 | Stop reason: SDUPTHER

## 2020-09-29 NOTE — TELEPHONE ENCOUNTER
"Attempted to contact patient twice. Message on only number provided stated " Sorry your call can not be completed at this time."  "

## 2020-09-30 DIAGNOSIS — I10 BENIGN ESSENTIAL HYPERTENSION: ICD-10-CM

## 2020-09-30 NOTE — TELEPHONE ENCOUNTER
----- Message from Bettie Carolina sent at 9/30/2020 10:07 AM CDT -----  Patient calling to have refill on medication, amlodipine-valsartan (EXFORGE)  mg per tablet.    Call back number [phone]. Tks

## 2020-09-30 NOTE — TELEPHONE ENCOUNTER
----- Message from Ahmet Valdez sent at 9/30/2020  1:23 PM CDT -----  Regarding: Medication question  Please call Adeel to discuss a medication question she has. She said her amlodipine was sent to the incorrect pharmacy [phone].    The correct pharmacy is     [facility]/pharmacy #[id number] - [place] - [address]  [address]  [place] [address]  Phone: [phone] Fax: [phone]

## 2020-10-12 RX ORDER — AMLODIPINE AND VALSARTAN 10; 320 MG/1; MG/1
1 TABLET ORAL DAILY
Qty: 90 TABLET | Refills: 0 | OUTPATIENT
Start: 2020-10-12 | End: 2021-10-12

## 2020-10-12 NOTE — TELEPHONE ENCOUNTER
ACTION NEEDED: Please notify patient that this refill request was not approved for the reason(s) listed below. Please schedule her LABS/TESTS PREVIOUSLY ORDERED AND DUE (listed below) and in-office appointment with me a few days later to review the results. Schedule override approved.    LABS/TESTS PREVIOUSLY ORDERED AND DUE  Lab   CBC Without Differential   Lipid Panel   Comprehensive metabolic panel   Hepatitis C Antibody   Mammo Digital Screening Bilat w/ Donal       REFILL REFUSED  REASON: She is overdue for a follow-up appointment. I have given her refills previously with instructions that LABS and APPOINTMENT were required prior to additional refills. (Refer to my previous documentation.)    Requested Prescriptions     Refused Prescriptions Disp Refills    amlodipine-valsartan (EXFORGE)  mg per tablet 90 tablet 0     Sig: Take 1 tablet by mouth once daily.     Refused By: KAREN ANDERSON     Reason for Refusal: Patient needs an appointment    #[id number]

## 2020-10-12 NOTE — TELEPHONE ENCOUNTER
Attempted to contact patient to inform her per Dr. Leigh that she is overdue for labs and a follow up with him. Unable to reach her on only phone number provided in chart and no voicemail available.

## 2020-10-22 ENCOUNTER — OFFICE VISIT (OUTPATIENT)
Dept: INTERNAL MEDICINE | Facility: CLINIC | Age: 45
End: 2020-10-22
Payer: COMMERCIAL

## 2020-10-22 ENCOUNTER — LAB VISIT (OUTPATIENT)
Dept: LAB | Facility: HOSPITAL | Age: 45
End: 2020-10-22
Attending: FAMILY MEDICINE
Payer: COMMERCIAL

## 2020-10-22 VITALS
HEIGHT: 66 IN | BODY MASS INDEX: 33.27 KG/M2 | TEMPERATURE: 98 F | WEIGHT: 207 LBS | OXYGEN SATURATION: 99 % | SYSTOLIC BLOOD PRESSURE: 120 MMHG | HEART RATE: 97 BPM | DIASTOLIC BLOOD PRESSURE: 80 MMHG

## 2020-10-22 DIAGNOSIS — Z00.00 PREVENTATIVE HEALTH CARE: Primary | ICD-10-CM

## 2020-10-22 DIAGNOSIS — Z11.59 ENCOUNTER FOR HEPATITIS C SCREENING TEST FOR LOW RISK PATIENT: ICD-10-CM

## 2020-10-22 DIAGNOSIS — E78.2 MODERATE MIXED HYPERLIPIDEMIA NOT REQUIRING STATIN THERAPY: ICD-10-CM

## 2020-10-22 DIAGNOSIS — K21.9 GASTRO-ESOPHAGEAL REFLUX DISEASE WITHOUT ESOPHAGITIS: Chronic | ICD-10-CM

## 2020-10-22 DIAGNOSIS — E66.09 CLASS 1 OBESITY DUE TO EXCESS CALORIES WITH SERIOUS COMORBIDITY AND BODY MASS INDEX (BMI) OF 33.0 TO 33.9 IN ADULT: Chronic | ICD-10-CM

## 2020-10-22 DIAGNOSIS — D75.839 THROMBOCYTOSIS: ICD-10-CM

## 2020-10-22 DIAGNOSIS — Z01.419 PAP SMEAR, AS PART OF ROUTINE GYNECOLOGICAL EXAMINATION: ICD-10-CM

## 2020-10-22 DIAGNOSIS — Z23 NEED FOR INFLUENZA VACCINATION: ICD-10-CM

## 2020-10-22 DIAGNOSIS — Z28.21 IMMUNIZATION NOT CARRIED OUT BECAUSE OF PATIENT REFUSAL: ICD-10-CM

## 2020-10-22 DIAGNOSIS — I10 BENIGN ESSENTIAL HYPERTENSION: ICD-10-CM

## 2020-10-22 DIAGNOSIS — Z12.31 BREAST CANCER SCREENING BY MAMMOGRAM: ICD-10-CM

## 2020-10-22 PROBLEM — R53.83 OTHER FATIGUE: Status: RESOLVED | Noted: 2018-07-13 | Resolved: 2020-10-22

## 2020-10-22 LAB
ALBUMIN SERPL BCP-MCNC: 3.8 G/DL (ref 3.5–5.2)
ALP SERPL-CCNC: 49 U/L (ref 55–135)
ALT SERPL W/O P-5'-P-CCNC: 14 U/L (ref 10–44)
ANION GAP SERPL CALC-SCNC: 8 MMOL/L (ref 8–16)
AST SERPL-CCNC: 14 U/L (ref 10–40)
BILIRUB SERPL-MCNC: 0.5 MG/DL (ref 0.1–1)
BUN SERPL-MCNC: 10 MG/DL (ref 6–20)
CALCIUM SERPL-MCNC: 9.8 MG/DL (ref 8.7–10.5)
CHLORIDE SERPL-SCNC: 111 MMOL/L (ref 95–110)
CHOLEST SERPL-MCNC: 143 MG/DL (ref 120–199)
CHOLEST/HDLC SERPL: 4.3 {RATIO} (ref 2–5)
CO2 SERPL-SCNC: 22 MMOL/L (ref 23–29)
CREAT SERPL-MCNC: 0.7 MG/DL (ref 0.5–1.4)
ERYTHROCYTE [DISTWIDTH] IN BLOOD BY AUTOMATED COUNT: 14.1 % (ref 11.5–14.5)
EST. GFR  (AFRICAN AMERICAN): >60 ML/MIN/1.73 M^2
EST. GFR  (NON AFRICAN AMERICAN): >60 ML/MIN/1.73 M^2
GLUCOSE SERPL-MCNC: 79 MG/DL (ref 70–110)
HCT VFR BLD AUTO: 37.5 % (ref 37–48.5)
HDLC SERPL-MCNC: 33 MG/DL (ref 40–75)
HDLC SERPL: 23.1 % (ref 20–50)
HGB BLD-MCNC: 12.1 G/DL (ref 12–16)
LDLC SERPL CALC-MCNC: 82.4 MG/DL (ref 63–159)
MCH RBC QN AUTO: 32.6 PG (ref 27–31)
MCHC RBC AUTO-ENTMCNC: 32.3 G/DL (ref 32–36)
MCV RBC AUTO: 101 FL (ref 82–98)
NONHDLC SERPL-MCNC: 110 MG/DL
PLATELET # BLD AUTO: 348 K/UL (ref 150–350)
PMV BLD AUTO: 9.3 FL (ref 9.2–12.9)
POTASSIUM SERPL-SCNC: 4.1 MMOL/L (ref 3.5–5.1)
PROT SERPL-MCNC: 6.7 G/DL (ref 6–8.4)
RBC # BLD AUTO: 3.71 M/UL (ref 4–5.4)
SODIUM SERPL-SCNC: 141 MMOL/L (ref 136–145)
TRIGL SERPL-MCNC: 138 MG/DL (ref 30–150)
WBC # BLD AUTO: 7.43 K/UL (ref 3.9–12.7)

## 2020-10-22 PROCEDURE — 99396 PREV VISIT EST AGE 40-64: CPT | Mod: S$GLB,,, | Performed by: FAMILY MEDICINE

## 2020-10-22 PROCEDURE — 99999 PR PBB SHADOW E&M-EST. PATIENT-LVL III: ICD-10-PCS | Mod: PBBFAC,,, | Performed by: FAMILY MEDICINE

## 2020-10-22 PROCEDURE — 85027 COMPLETE CBC AUTOMATED: CPT

## 2020-10-22 PROCEDURE — 36415 COLL VENOUS BLD VENIPUNCTURE: CPT

## 2020-10-22 PROCEDURE — 80061 LIPID PANEL: CPT

## 2020-10-22 PROCEDURE — 99396 PR PREVENTIVE VISIT,EST,40-64: ICD-10-PCS | Mod: S$GLB,,, | Performed by: FAMILY MEDICINE

## 2020-10-22 PROCEDURE — 99999 PR PBB SHADOW E&M-EST. PATIENT-LVL III: CPT | Mod: PBBFAC,,, | Performed by: FAMILY MEDICINE

## 2020-10-22 PROCEDURE — 3074F SYST BP LT 130 MM HG: CPT | Mod: CPTII,S$GLB,, | Performed by: FAMILY MEDICINE

## 2020-10-22 PROCEDURE — 3074F PR MOST RECENT SYSTOLIC BLOOD PRESSURE < 130 MM HG: ICD-10-PCS | Mod: CPTII,S$GLB,, | Performed by: FAMILY MEDICINE

## 2020-10-22 PROCEDURE — 3079F PR MOST RECENT DIASTOLIC BLOOD PRESSURE 80-89 MM HG: ICD-10-PCS | Mod: CPTII,S$GLB,, | Performed by: FAMILY MEDICINE

## 2020-10-22 PROCEDURE — 3079F DIAST BP 80-89 MM HG: CPT | Mod: CPTII,S$GLB,, | Performed by: FAMILY MEDICINE

## 2020-10-22 PROCEDURE — 3008F BODY MASS INDEX DOCD: CPT | Mod: CPTII,S$GLB,, | Performed by: FAMILY MEDICINE

## 2020-10-22 PROCEDURE — 80053 COMPREHEN METABOLIC PANEL: CPT

## 2020-10-22 PROCEDURE — 3008F PR BODY MASS INDEX (BMI) DOCUMENTED: ICD-10-PCS | Mod: CPTII,S$GLB,, | Performed by: FAMILY MEDICINE

## 2020-10-22 PROCEDURE — 86803 HEPATITIS C AB TEST: CPT

## 2020-10-22 RX ORDER — AMLODIPINE AND VALSARTAN 10; 320 MG/1; MG/1
1 TABLET ORAL DAILY
Qty: 90 TABLET | Refills: 4 | Status: SHIPPED | OUTPATIENT
Start: 2020-10-22 | End: 2021-07-08 | Stop reason: SDUPTHER

## 2020-10-22 NOTE — ASSESSMENT & PLAN NOTE
BP Readings from Last 6 Encounters:   10/22/20 120/80   10/18/19 138/88   09/13/19 122/70   08/17/18 124/86   08/17/18 124/86   07/13/18 (!) 144/96   Well controlled, stable.

## 2020-10-22 NOTE — PROGRESS NOTES
WELLNESS VISIT (PREVENTIVE SERVICES)    CHIEF COMPLAINT  Annual Wellness Exam    HEALTH MAINTENANCE INTERVENTIONS - UP TO DATE  Health Maintenance Topics with due status: Not Due       Topic Last Completion Date    TETANUS VACCINE 11/10/2016    Cervical Cancer Screening 08/17/2018    Lipid Panel 09/13/2019       HEALTH MAINTENANCE INTERVENTIONS - DUE OR DUE SOON  Health Maintenance Due   Topic Date Due    Hepatitis C Screening  1975    Mammogram  09/13/2020     Future Appointments   Date Time Provider Department Center   10/22/2020 11:40 AM LAB[facility]ATORY, [facility] [place]   11/13/2020  2:40 PM [facility] [place]      LABS/TESTS PREVIOUSLY ORDERED AND DUE  Lab   CBC Without Differential   Lipid Panel   Comprehensive metabolic panel   Hepatitis C Antibody       Except as noted herein, any chronic conditions are compensated/controlled and stable, and no other significant new complaints or concerns reported.     DIAGNOSES, HISTORY, ASSESSMENT, AND PLAN  Assessment   1. Preventative health care    2. Benign essential hypertension    3. Class 1 obesity due to excess calories with serious comorbidity and body mass index (BMI) of 33.0 to 33.9 in adult    4. Breast cancer screening by mammogram    5. Pap smear, as part of routine gynecological examination    6. Need for influenza vaccination    7. Influenza immunization not carried out because of patient refusal    8. Gastro-esophageal reflux disease without esophagitis         Orders Placed This Encounter    Ambulatory referral/consult to Gynecology    amlodipine-valsartan (EXFORGE)  mg per tablet       Plan   Problem List Items Addressed This Visit     Benign essential hypertension    Current Assessment & Plan     BP Readings from Last 6 Encounters:   10/22/20 120/80   10/18/19 138/88   09/13/19 122/70   08/17/18 124/86   08/17/18 124/86   07/13/18 (!) 144/96   Well controlled, stable.         Relevant Medications     amlodipine-valsartan (EXFORGE)  mg per tablet    Class 1 obesity due to excess calories with comorbidity and body mass index (BMI) of 33.0 to 33.9 in adult (Chronic)    Current Assessment & Plan     Wt Readings from Last 6 Encounters:   10/22/20 93.9 kg (207 lb 0.2 oz)   10/18/19 101.6 kg (223 lb 15.8 oz)   09/13/19 97.8 kg (215 lb 9.8 oz)   08/17/18 105.3 kg (232 lb 2.3 oz)   08/17/18 105.3 kg (232 lb 0.6 oz)   07/13/18 107.3 kg (236 lb 8.9 oz)     BMI Readings from Last 2 Encounters:   10/22/20 33.41 kg/m²   10/18/19 36.15 kg/m²     Improved with therapeutic lifestyle changes.          Gastro-esophageal reflux disease without esophagitis (Chronic)    Current Assessment & Plan     This condition appears to be RESOLVED. She reports no residual symptoms or impairment.         Influenza immunization not carried out because of patient refusal      Other Visit Diagnoses     Preventative health care    -  Primary    Breast cancer screening by mammogram        Pap smear, as part of routine gynecological examination        Relevant Orders    Ambulatory referral/consult to Gynecology    Need for influenza vaccination              Outpatient Medications Prior to Visit   Medication Sig Dispense Refill    amlodipine-valsartan (EXFORGE)  mg per tablet Take 1 tablet by mouth once daily. 90 tablet 0     No facility-administered medications prior to visit.         Medications Discontinued During This Encounter   Medication Reason    amlodipine-valsartan (EXFORGE)  mg per tablet Reorder        Medications Ordered This Encounter   Medications    amlodipine-valsartan (EXFORGE)  mg per tablet     Sig: Take 1 tablet by mouth once daily.     Dispense:  90 tablet     Refill:  4       Follow up in about 1 year (around 10/22/2021) for wellness and preventive services.     Subjective   Review of Systems   Constitutional: Negative for chills and fever.   HENT: Negative for ear pain and trouble swallowing.    Eyes:  "Negative for pain and visual disturbance.   Respiratory: Negative for chest tightness and shortness of breath.    Cardiovascular: Negative for chest pain and palpitations.   Gastrointestinal: Negative for abdominal pain and blood in stool.   Endocrine: Negative for polydipsia and polyuria.   Genitourinary: Negative.  Negative for difficulty urinating, dysuria and hematuria.   Musculoskeletal: Negative for gait problem and joint swelling.   Integumentary:  Negative for rash and wound.   Allergic/Immunologic: Negative for immunocompromised state.   Neurological: Negative for vertigo and syncope.   Hematological: Negative.    Psychiatric/Behavioral: Negative for agitation and confusion.        Objective   Vitals:    10/22/20 0958   BP: 120/80   BP Location: Right arm   Patient Position: Sitting   BP Method: Medium (Manual)   Pulse: 97   Temp: 97.5 °F (36.4 °C)   TempSrc: Tympanic   SpO2: 99%   Weight: 93.9 kg (207 lb 0.2 oz)   Height: 5' 6" (1.676 m)     Physical Exam  Vitals signs reviewed.   Constitutional:       General: She is not in acute distress.     Appearance: Normal appearance.   Eyes:      General: No scleral icterus.     Conjunctiva/sclera: Conjunctivae normal.   Neck:      Musculoskeletal: No muscular tenderness.      Vascular: No carotid bruit.   Cardiovascular:      Rate and Rhythm: Normal rate and regular rhythm.      Heart sounds: Normal heart sounds.   Pulmonary:      Effort: Pulmonary effort is normal. No respiratory distress.      Breath sounds: Normal breath sounds. No wheezing or rhonchi.   Abdominal:      General: Bowel sounds are normal. There is no distension.      Palpations: Abdomen is soft. There is no mass.      Tenderness: There is no abdominal tenderness.   Lymphadenopathy:      Cervical: No cervical adenopathy.   Skin:     General: Skin is warm and dry.      Coloration: Skin is not jaundiced.   Neurological:      General: No focal deficit present.      Mental Status: She is alert and " "oriented to person, place, and time.   Psychiatric:         Mood and Affect: Mood normal.         Behavior: Behavior normal.         Judgment: Judgment normal.           PAST MEDICAL HISTORY  She has a past medical history of Class 1 obesity due to excess calories with serious comorbidity and body mass index (BMI) of 34.0 to 34.9 in adult, Class 2 severe obesity due to excess calories with serious comorbidity and body mass index (BMI) of 38.0 to 38.9 in adult, Depression, History of endometrial ablation, Hypertension, Obesity, S/P tubal ligation, Severe obesity with body mass index (BMI) of 36.0 to 36.9 with serious comorbidity, and Tobacco use disorder.    SURGICAL HISTORY  She has a past surgical history that includes Vaginal delivery; Tubal ligation (02/28/2017); and Endometrial ablation (02/28/2017).    FAMILY HISTORY  Her family history includes Cancer in her father; Hypertension in her father, mother, and son; Kidney disease in her son; No Known Problems in her brother, sister, and sister.     ALLERGIES  Review of patient's allergies indicates:  No Known Allergies    SOCIAL HISTORY   TOBACCO USE HISTORY: She reports that she has quit smoking. She has a 10.00 pack-year smoking history. She has never used smokeless tobacco.   ALCOHOL USE HISTORY:  She reports no history of alcohol use.   RECREATIONAL DRUG USE HISTORY:  She reports no history of drug use.    Documentation entered by me for this encounter may have been done in part using speech-recognition technology. Although I have made an effort to ensure accuracy, "sound like" errors may exist and should be interpreted in context. -DAISY Leigh MD.     "

## 2020-10-22 NOTE — ASSESSMENT & PLAN NOTE
Wt Readings from Last 6 Encounters:   10/22/20 93.9 kg (207 lb 0.2 oz)   10/18/19 101.6 kg (223 lb 15.8 oz)   09/13/19 97.8 kg (215 lb 9.8 oz)   08/17/18 105.3 kg (232 lb 2.3 oz)   08/17/18 105.3 kg (232 lb 0.6 oz)   07/13/18 107.3 kg (236 lb 8.9 oz)     BMI Readings from Last 2 Encounters:   10/22/20 33.41 kg/m²   10/18/19 36.15 kg/m²     Improved with therapeutic lifestyle changes.

## 2020-10-23 LAB — HCV AB SERPL QL IA: NEGATIVE

## 2020-10-24 NOTE — PROGRESS NOTES
"Adeel Araujo.    These results are OK and do not require a change in treatment right now. We can discuss your test results in detail at your next appointment.    Thanks for letting me care for you, thanks for trusting us with your healthcare needs, and thanks for using [facility].    Sincerely,    DAISY Leigh MD  --------------------------------------------------------------------------------   Want to learn more about your test results and what they mean?  (1) Log in to your [facility] account at [email or web address].  (2) From the "View test results" tab, click on the test you want to know more about.  (3) Click on the "About This Test" link."

## 2020-11-02 ENCOUNTER — PATIENT MESSAGE (OUTPATIENT)
Dept: OBSTETRICS AND GYNECOLOGY | Facility: CLINIC | Age: 45
End: 2020-11-02

## 2020-12-11 ENCOUNTER — PATIENT MESSAGE (OUTPATIENT)
Dept: OTHER | Facility: OTHER | Age: 45
End: 2020-12-11

## 2020-12-14 ENCOUNTER — PATIENT OUTREACH (OUTPATIENT)
Dept: ADMINISTRATIVE | Facility: OTHER | Age: 45
End: 2020-12-14

## 2020-12-15 ENCOUNTER — OFFICE VISIT (OUTPATIENT)
Dept: OBSTETRICS AND GYNECOLOGY | Facility: CLINIC | Age: 45
End: 2020-12-15
Payer: COMMERCIAL

## 2020-12-15 ENCOUNTER — HOSPITAL ENCOUNTER (OUTPATIENT)
Dept: RADIOLOGY | Facility: HOSPITAL | Age: 45
Discharge: HOME | End: 2020-12-15
Attending: FAMILY MEDICINE
Payer: COMMERCIAL

## 2020-12-15 VITALS
BODY MASS INDEX: 33.84 KG/M2 | WEIGHT: 210.56 LBS | DIASTOLIC BLOOD PRESSURE: 80 MMHG | SYSTOLIC BLOOD PRESSURE: 128 MMHG | HEIGHT: 66 IN

## 2020-12-15 DIAGNOSIS — Z01.419 PAP SMEAR, AS PART OF ROUTINE GYNECOLOGICAL EXAMINATION: ICD-10-CM

## 2020-12-15 DIAGNOSIS — Z01.419 ENCOUNTER FOR GYNECOLOGICAL EXAMINATION WITHOUT ABNORMAL FINDING: Primary | ICD-10-CM

## 2020-12-15 DIAGNOSIS — Z12.31 BREAST CANCER SCREENING BY MAMMOGRAM: ICD-10-CM

## 2020-12-15 PROCEDURE — 99999 PR PBB SHADOW E&M-EST. PATIENT-LVL III: ICD-10-PCS | Mod: PBBFAC,,, | Performed by: NURSE PRACTITIONER

## 2020-12-15 PROCEDURE — 99396 PR PREVENTIVE VISIT,EST,40-64: ICD-10-PCS | Mod: S$GLB,,, | Performed by: NURSE PRACTITIONER

## 2020-12-15 PROCEDURE — 77067 SCR MAMMO BI INCL CAD: CPT | Mod: TC

## 2020-12-15 PROCEDURE — 77067 MAMMO DIGITAL SCREENING BILAT WITH TOMO: ICD-10-PCS | Mod: 26,,, | Performed by: RADIOLOGY

## 2020-12-15 PROCEDURE — 77063 BREAST TOMOSYNTHESIS BI: CPT | Mod: 26,,, | Performed by: RADIOLOGY

## 2020-12-15 PROCEDURE — 99396 PREV VISIT EST AGE 40-64: CPT | Mod: S$GLB,,, | Performed by: NURSE PRACTITIONER

## 2020-12-15 PROCEDURE — 99213 OFFICE O/P EST LOW 20 MIN: CPT | Mod: PBBFAC | Performed by: NURSE PRACTITIONER

## 2020-12-15 PROCEDURE — 77063 MAMMO DIGITAL SCREENING BILAT WITH TOMO: ICD-10-PCS | Mod: 26,,, | Performed by: RADIOLOGY

## 2020-12-15 PROCEDURE — 77067 SCR MAMMO BI INCL CAD: CPT | Mod: 26,,, | Performed by: RADIOLOGY

## 2020-12-15 PROCEDURE — 99999 PR PBB SHADOW E&M-EST. PATIENT-LVL III: CPT | Mod: PBBFAC,,, | Performed by: NURSE PRACTITIONER

## 2020-12-15 NOTE — LETTER
December 15, 2020      DAISY Leigh MD  [address] [facility]  [place] [address]           [facility]  [address] [facility] [facility] [address]  Phone: [phone]  Fax: [phone]          Patient: Adeel Gordon   MR Number: [id number]   YOB: 1975   Date of Visit: 12/15/2020       Dear Dr. DAISY Leigh:    Thank you for referring Adeel Gordon to me for evaluation. Attached you will find relevant portions of my assessment and plan of care.    If you have questions, please do not hesitate to call me. I look forward to following Adeel Gordon along with you.    Sincerely,    Lorie Olvera, NP    Enclosure  CC:  No Recipients    If you would like to receive this communication electronically, please contact [email or web address] or [phone] to request more information on [employer] Link access.    For providers and/or their staff who would like to refer a patient to [facility], please contact us through our one-stop-shop provider referral line, [facility] , at [phone].    If you feel you have received this communication in error or would no longer like to receive these types of communications, please e-mail [email or web address]

## 2020-12-15 NOTE — PROGRESS NOTES
CC: Well woman exam    Adeel Gordon is a 45 y.o. female  presents for well woman exam.  LMP: Patient's last menstrual period was 2020..  No issues, problems, or complaints.  Ablation done by Dr. Martínez in 2017 - spotting monthly but very light and much improved from previous bleeding. Pt without complaints.     Past Medical History:   Diagnosis Date    Class 1 obesity due to excess calories with serious comorbidity and body mass index (BMI) of 34.0 to 34.9 in adult 12/3/2015    Class 2 severe obesity due to excess calories with serious comorbidity and body mass index (BMI) of 38.0 to 38.9 in adult 12/3/2015    Depression     not taking meds     History of endometrial ablation 2017    Hypertension     Obesity     S/P tubal ligation 2017    Severe obesity with body mass index (BMI) of 36.0 to 36.9 with serious comorbidity 12/3/2015    Tobacco use disorder      Past Surgical History:   Procedure Laterality Date    ENDOMETRIAL ABLATION  2017    TUBAL LIGATION  2017    VAGINAL DELIVERY      x 3     Social History     Socioeconomic History    Marital status:      Spouse name: Not on file    Number of children: 3    Years of education: Not on file    Highest education level: Not on file   Occupational History    Occupation:      Employer: [employer]     Comment: [employer]   Social Needs    Financial resource strain: Not hard at all    Food insecurity     Worry: Never true     Inability: Never true    Transportation needs     Medical: No     Non-medical: No   Tobacco Use    Smoking status: Former Smoker     Packs/day: 0.50     Years: 20.00     Pack years: 10.00    Smokeless tobacco: Never Used   Substance and Sexual Activity    Alcohol use: No     Alcohol/week: 0.0 standard drinks     Frequency: Never    Drug use: No    Sexual activity: Yes     Partners: Male     Birth control/protection: None   Lifestyle  "   Physical activity     Days per week: Not on file     Minutes per session: Not on file    Stress: Not on file   Relationships    Social connections     Talks on phone: More than three times a week     Gets together: Once a week     Attends Zoroastrian service: More than 4 times per year     Active member of club or organization: Yes     Attends meetings of clubs or organizations: More than 4 times per year     Relationship status: Living with partner   Other Topics Concern    Not on file   Social History Narrative    Not on file     Family History   Problem Relation Age of Onset    No Known Problems Sister     No Known Problems Sister     No Known Problems Brother     Kidney disease Son     Hypertension Son     Cancer Father         THROAT    Hypertension Father     Hypertension Mother      OB History        3    Para   3    Term   3            AB        Living   3       SAB        TAB        Ectopic        Multiple        Live Births   3                 /80   Ht 5' 6" (1.676 m)   Wt 95.5 kg (210 lb 8.6 oz)   LMP 2020   BMI 33.98 kg/m²       ROS:  GENERAL: Denies weight gain or weight loss. Feeling well overall.   SKIN: Denies rash or lesions.   HEAD: Denies head injury or headache.   NODES: Denies enlarged lymph nodes.   CHEST: Denies chest pain or shortness of breath.   CARDIOVASCULAR: Denies palpitations or left sided chest pain.   ABDOMEN: No abdominal pain, constipation, diarrhea, nausea, vomiting or rectal bleeding.   URINARY: No frequency, dysuria, hematuria, or burning on urination.  REPRODUCTIVE: See HPI.   BREASTS: The patient performs breast self-examination and denies pain, lumps, or nipple discharge.   HEMATOLOGIC: No easy bruisability or excessive bleeding.   MUSCULOSKELETAL: Denies joint pain or swelling.   NEUROLOGIC: Denies syncope or weakness.   PSYCHIATRIC: Denies depression, anxiety or mood swings.    PHYSICAL EXAM:  APPEARANCE: Well nourished, well " developed, in no acute distress.  AFFECT: WNL, alert and oriented x 3  SKIN: No acne or hirsutism  NECK: Neck symmetric without masses or thyromegaly  NODES: No inguinal, cervical, axillary, or femoral lymph node enlargement  CHEST: Good respiratory effect  ABDOMEN: Soft.  No tenderness or masses.  No hepatosplenomegaly.  No hernias.  BREASTS: Symmetrical, no skin changes or visible lesions.  No palpable masses, nipple discharge bilaterally.  PELVIC: Normal external genitalia without lesions.  Normal hair distribution.  Adequate perineal body, normal urethral meatus.  Vagina moist and well rugated without lesions or discharge.  Cervix pink, without lesions, discharge or tenderness.  No significant cystocele or rectocele.  Bimanual exam shows uterus to be normal size, regular, mobile and nontender.  Adnexa without masses or tenderness.    EXTREMITIES: No edema.  Physical Exam    1. Encounter for gynecological examination without abnormal finding     2. Pap smear, as part of routine gynecological examination  Ambulatory referral/consult to Gynecology    AND PLAN:    Patient was counseled today on A.C.S. Pap guidelines and recommendations for yearly pelvic exams, mammograms and monthly self breast exams; to see her PCP for other health maintenance.

## 2020-12-15 NOTE — PATIENT INSTRUCTIONS
Breast Health: Breast Self-Awareness  What is breast self-awareness?  Breast self-awareness is knowing how your breasts normally look and feel. Your breasts change as you go through different stages of your life. So its important to learn what is normal for your breasts. Breast self-awareness helps you notice any changes in your breasts right away. Report any changes to your healthcare provider.  Why is breast self-awareness important?  Many experts now say that women should focus on breast self-awareness instead of doing a breast self-examination (BSE). These experts include the American Cancer Society, the U.S. Preventive Services Task Force, and the American Congress of Obstetricians and Gynecologists. Some experts even advise not teaching women to do a BSE. Thats because research hasnt shown a clear benefit to doing BSEs.  Breast self-awareness is different than a BSE. Breast self-awareness isnt about following a certain method and schedule. Its about knowing what's normal for your breasts. That way you can notice even small changes right away. If you see any changes, report them to your healthcare provider.  Changes to look for  Call your healthcare provider if you find any changes in your breasts that concern you. These changes may include:  · A lump  · Nipple discharge other than breast milk, especially a bloody discharge  · Swelling  · A change in size or shape  · Skin irritation, such as redness, thickening, or dimpling of the skin  · Swollen lymph nodes in the armpit  · Nipple problems, such as pain or redness  If you find a lump  Contact your provider if you find lumpiness in one breast, feel something different in the tissue, or feel a definite lump. Sometimes lumpiness may be due to menstrual changes. But there may be reason for concern.  Your provider may want to see you right away if you have:  · Nipple discharge that is bloody  · Skin changes on your breast, such as dimpling or puckering  Its  normal to be upset if you find a lump. But its important to contact your provider right away. Remember that most breast lumps are benign. This means they are not cancer.  Date Last Reviewed: 8/10/2015  © 3551-3625 [employer]. [address] [place] [address]. All rights reserved. This information is not intended as a substitute for professional medical care. Always follow your healthcare professional's instructions.        Clinical Breast Exam    Many health organizations recommend a yearly clinical breast exam. This exam may be done by a gynecologist, family healthcare provider, nurse practitioner, or specially trained nurse. Yearly breast exams help to make sure that breast conditions are found early.  Your healthcare providers role  A healthcare professional knows the tests and follow-up care needed if a problem is found. Your clinical exam is also a great time to ask questions about breast self-exams. You can find out if youre checking your breasts in the best way. Or you may want to ask how pregnancy, breast implants, or breast reduction surgery affect the way you should check your breasts.  Diagnostic tests  If a clinical exam reveals a breast change, you may have other tests to find out more. These tests may include:  · Mammography. A low-dose X-ray of your breast tissue.  · Ultrasound. An imaging test that uses sound waves to create images of your breast.  · Biopsy. A small amount of breast tissue is removed by needle or by a cut (incision). The tissue is then checked under a microscope.  Guidelines for having clinical breast exams  The American College of Obstetricians and Gynecologists recommends that starting at age 29, you should have a clinical breast exam every 1 to 3 years. After age 40, have a clinical breast exam each year. If youre at higher risk for breast cancer, you may need exams more often. Risk factors for breast cancer may include:  · Being over 50 or  postmenopausal  · Having a family history of breast cancer  · Having the BRCA1 or BRCA2 gene mutation or certain other gene mutations  · Having more menstrual periods due to starting menstruation early (before age 12) or having a late menopause (after age 55)  · Having no pregnancies  · Having a first pregnancy after age 30  · Being obese  · Having a history of radiation treatment to your chest area  · Exposure to SAAD during your mother's pregnancy  · Not being active  · Drinking too much alcohol  · Having dense breast tissue  · Taking hormone therapy after menopause  Other health organizations have different recommendations. Talk to your healthcare provider about what is best for you.   Date Last Reviewed: 8/13/2015  © 2147-9125 [employer]. [address] [place] [address], [place] [address]. All rights reserved. This information is not intended as a substitute for professional medical care. Always follow your healthcare professional's instructions.

## 2020-12-23 ENCOUNTER — HOSPITAL ENCOUNTER (OUTPATIENT)
Dept: RADIOLOGY | Facility: HOSPITAL | Age: 45
Discharge: HOME | End: 2020-12-23
Attending: FAMILY MEDICINE
Payer: COMMERCIAL

## 2020-12-23 DIAGNOSIS — R92.8 ABNORMAL MAMMOGRAM: ICD-10-CM

## 2020-12-23 PROCEDURE — 77061 BREAST TOMOSYNTHESIS UNI: CPT | Mod: TC,RT

## 2020-12-23 PROCEDURE — 77061 MAMMO DIGITAL DIAGNOSTIC RIGHT WITH TOMO: ICD-10-PCS | Mod: 26,RT,, | Performed by: RADIOLOGY

## 2020-12-23 PROCEDURE — 77065 DX MAMMO INCL CAD UNI: CPT | Mod: 26,RT,, | Performed by: RADIOLOGY

## 2020-12-23 PROCEDURE — 76642 US BREAST RIGHT LIMITED: ICD-10-PCS | Mod: 26,RT,, | Performed by: RADIOLOGY

## 2020-12-23 PROCEDURE — 76642 ULTRASOUND BREAST LIMITED: CPT | Mod: TC,RT

## 2020-12-23 PROCEDURE — 76642 ULTRASOUND BREAST LIMITED: CPT | Mod: 26,RT,, | Performed by: RADIOLOGY

## 2020-12-23 PROCEDURE — 77065 MAMMO DIGITAL DIAGNOSTIC RIGHT WITH TOMO: ICD-10-PCS | Mod: 26,RT,, | Performed by: RADIOLOGY

## 2020-12-23 PROCEDURE — 77061 BREAST TOMOSYNTHESIS UNI: CPT | Mod: 26,RT,, | Performed by: RADIOLOGY

## 2020-12-23 PROCEDURE — 77065 DX MAMMO INCL CAD UNI: CPT | Mod: TC,RT

## 2021-01-03 NOTE — PROGRESS NOTES
"Adeel Araujo.    I am proud of you for getting your breast cancer screening done!    I was very happy to see that the radiologist interpreted your test result findings as "BENIGN."    This is considered a NEGATIVE mammogram result. (In this case, negative means that nothing bad was found.)    An annual mammogram is the best test to screen for breast cancer, but it is not perfect, and it can miss some cancers. So, even though your mammogram was negative, if you notice any lump or change in one of your breasts, please schedule an appointment with me for a proper evaluation.    Thanks for letting me care for you, and thanks for trusting [facility] with [facility].    Sincerely,    DAISY Leigh MD"
No

## 2021-02-25 ENCOUNTER — TELEPHONE (OUTPATIENT)
Dept: INTERNAL MEDICINE | Facility: CLINIC | Age: 46
End: 2021-02-25

## 2021-03-01 ENCOUNTER — TELEPHONE (OUTPATIENT)
Dept: INTERNAL MEDICINE | Facility: CLINIC | Age: 46
End: 2021-03-01

## 2021-03-01 DIAGNOSIS — F17.290 NICOTINE DEPENDENCE, OTHER TOBACCO PRODUCT, UNCOMPLICATED: Primary | ICD-10-CM

## 2021-03-05 ENCOUNTER — CLINICAL SUPPORT (OUTPATIENT)
Dept: SMOKING CESSATION | Facility: CLINIC | Age: 46
End: 2021-03-05
Payer: COMMERCIAL

## 2021-03-05 DIAGNOSIS — F17.200 NICOTINE DEPENDENCE: Primary | ICD-10-CM

## 2021-03-05 PROCEDURE — 99404 PREV MED CNSL INDIV APPRX 60: CPT | Mod: S$GLB,,, | Performed by: GENERAL PRACTICE

## 2021-03-05 PROCEDURE — 99404 PR PREVENT COUNSEL,INDIV,60 MIN: ICD-10-PCS | Mod: S$GLB,,, | Performed by: GENERAL PRACTICE

## 2021-03-05 PROCEDURE — 99999 PR PBB SHADOW E&M-EST. PATIENT-LVL I: CPT | Mod: PBBFAC,,,

## 2021-03-05 PROCEDURE — 99999 PR PBB SHADOW E&M-EST. PATIENT-LVL I: ICD-10-PCS | Mod: PBBFAC,,,

## 2021-03-05 RX ORDER — BUPROPION HYDROCHLORIDE 150 MG/1
150 TABLET, EXTENDED RELEASE ORAL 2 TIMES DAILY
Qty: 60 TABLET | Refills: 2 | Status: SHIPPED | OUTPATIENT
Start: 2021-03-05 | End: 2021-12-06

## 2021-03-16 ENCOUNTER — CLINICAL SUPPORT (OUTPATIENT)
Dept: SMOKING CESSATION | Facility: CLINIC | Age: 46
End: 2021-03-16
Payer: COMMERCIAL

## 2021-03-16 DIAGNOSIS — F17.200 NICOTINE DEPENDENCE: Primary | ICD-10-CM

## 2021-03-16 PROCEDURE — 99404 PREV MED CNSL INDIV APPRX 60: CPT | Mod: S$GLB,,, | Performed by: GENERAL PRACTICE

## 2021-03-16 PROCEDURE — 99404 PR PREVENT COUNSEL,INDIV,60 MIN: ICD-10-PCS | Mod: S$GLB,,, | Performed by: GENERAL PRACTICE

## 2021-03-24 ENCOUNTER — CLINICAL SUPPORT (OUTPATIENT)
Dept: SMOKING CESSATION | Facility: CLINIC | Age: 46
End: 2021-03-24
Payer: COMMERCIAL

## 2021-03-24 DIAGNOSIS — F17.200 NICOTINE DEPENDENCE: Primary | ICD-10-CM

## 2021-03-24 PROCEDURE — 99407 PR TOBACCO USE CESSATION INTENSIVE >10 MINUTES: ICD-10-PCS | Mod: S$GLB,,, | Performed by: GENERAL PRACTICE

## 2021-03-24 PROCEDURE — 99407 BEHAV CHNG SMOKING > 10 MIN: CPT | Mod: S$GLB,,, | Performed by: GENERAL PRACTICE

## 2021-03-24 RX ORDER — VARENICLINE TARTRATE 0.5 (11)-1
KIT ORAL
Qty: 53 TABLET | Refills: 0 | Status: SHIPPED | OUTPATIENT
Start: 2021-03-24 | End: 2021-12-06

## 2021-03-31 ENCOUNTER — CLINICAL SUPPORT (OUTPATIENT)
Dept: SMOKING CESSATION | Facility: CLINIC | Age: 46
End: 2021-03-31
Payer: COMMERCIAL

## 2021-03-31 DIAGNOSIS — F17.200 NICOTINE DEPENDENCE: Primary | ICD-10-CM

## 2021-03-31 PROCEDURE — 99404 PR PREVENT COUNSEL,INDIV,60 MIN: ICD-10-PCS | Mod: S$GLB,,, | Performed by: GENERAL PRACTICE

## 2021-03-31 PROCEDURE — 99404 PREV MED CNSL INDIV APPRX 60: CPT | Mod: S$GLB,,, | Performed by: GENERAL PRACTICE

## 2021-03-31 PROCEDURE — 99999 PR PBB SHADOW E&M-EST. PATIENT-LVL I: CPT | Mod: PBBFAC,,,

## 2021-03-31 PROCEDURE — 99999 PR PBB SHADOW E&M-EST. PATIENT-LVL I: ICD-10-PCS | Mod: PBBFAC,,,

## 2021-04-22 ENCOUNTER — CLINICAL SUPPORT (OUTPATIENT)
Dept: SMOKING CESSATION | Facility: CLINIC | Age: 46
End: 2021-04-22
Payer: COMMERCIAL

## 2021-04-22 DIAGNOSIS — F17.200 NICOTINE DEPENDENCE: Primary | ICD-10-CM

## 2021-04-22 PROCEDURE — 99999 PR PBB SHADOW E&M-EST. PATIENT-LVL I: CPT | Mod: PBBFAC,,,

## 2021-04-22 PROCEDURE — 99402 PREV MED CNSL INDIV APPRX 30: CPT | Mod: S$GLB,,, | Performed by: GENERAL PRACTICE

## 2021-04-22 PROCEDURE — 99402 PR PREVENT COUNSEL,INDIV,30 MIN: ICD-10-PCS | Mod: S$GLB,,, | Performed by: GENERAL PRACTICE

## 2021-04-22 PROCEDURE — 99999 PR PBB SHADOW E&M-EST. PATIENT-LVL I: ICD-10-PCS | Mod: PBBFAC,,,

## 2021-04-22 RX ORDER — IBUPROFEN 200 MG
1 TABLET ORAL DAILY
Qty: 14 PATCH | Refills: 1 | Status: SHIPPED | OUTPATIENT
Start: 2021-04-22 | End: 2021-05-03

## 2021-04-28 ENCOUNTER — PATIENT MESSAGE (OUTPATIENT)
Dept: RESEARCH | Facility: HOSPITAL | Age: 46
End: 2021-04-28

## 2021-05-03 ENCOUNTER — CLINICAL SUPPORT (OUTPATIENT)
Dept: SMOKING CESSATION | Facility: CLINIC | Age: 46
End: 2021-05-03
Payer: COMMERCIAL

## 2021-05-03 DIAGNOSIS — F17.200 NICOTINE DEPENDENCE: Primary | ICD-10-CM

## 2021-05-03 PROCEDURE — 99402 PREV MED CNSL INDIV APPRX 30: CPT | Mod: S$GLB,,, | Performed by: GENERAL PRACTICE

## 2021-05-03 PROCEDURE — 99999 PR PBB SHADOW E&M-EST. PATIENT-LVL I: CPT | Mod: PBBFAC,,,

## 2021-05-03 PROCEDURE — 99999 PR PBB SHADOW E&M-EST. PATIENT-LVL I: ICD-10-PCS | Mod: PBBFAC,,,

## 2021-05-03 PROCEDURE — 99402 PR PREVENT COUNSEL,INDIV,30 MIN: ICD-10-PCS | Mod: S$GLB,,, | Performed by: GENERAL PRACTICE

## 2021-05-03 RX ORDER — NICOTINE 7MG/24HR
1 PATCH, TRANSDERMAL 24 HOURS TRANSDERMAL DAILY
Qty: 14 PATCH | Refills: 0 | Status: SHIPPED | OUTPATIENT
Start: 2021-05-03 | End: 2021-05-19 | Stop reason: SDUPTHER

## 2021-05-03 RX ORDER — DM/P-EPHED/ACETAMINOPH/DOXYLAM 30-7.5/3
2 LIQUID (ML) ORAL
Qty: 100 LOZENGE | Refills: 0 | Status: SHIPPED | OUTPATIENT
Start: 2021-05-03 | End: 2021-05-12 | Stop reason: SDUPTHER

## 2021-05-12 ENCOUNTER — CLINICAL SUPPORT (OUTPATIENT)
Dept: SMOKING CESSATION | Facility: CLINIC | Age: 46
End: 2021-05-12
Payer: COMMERCIAL

## 2021-05-12 DIAGNOSIS — F17.200 NICOTINE DEPENDENCE: Primary | ICD-10-CM

## 2021-05-12 PROCEDURE — 99999 PR PBB SHADOW E&M-EST. PATIENT-LVL II: ICD-10-PCS | Mod: PBBFAC,,,

## 2021-05-12 PROCEDURE — 99999 PR PBB SHADOW E&M-EST. PATIENT-LVL II: CPT | Mod: PBBFAC,,,

## 2021-05-12 RX ORDER — DM/P-EPHED/ACETAMINOPH/DOXYLAM 30-7.5/3
2 LIQUID (ML) ORAL
Qty: 100 LOZENGE | Refills: 0 | Status: SHIPPED | OUTPATIENT
Start: 2021-05-12 | End: 2021-05-19 | Stop reason: ALTCHOICE

## 2021-05-19 ENCOUNTER — CLINICAL SUPPORT (OUTPATIENT)
Dept: SMOKING CESSATION | Facility: CLINIC | Age: 46
End: 2021-05-19
Payer: COMMERCIAL

## 2021-05-19 DIAGNOSIS — F17.200 NICOTINE DEPENDENCE: Primary | ICD-10-CM

## 2021-05-19 PROCEDURE — 99999 PR PBB SHADOW E&M-EST. PATIENT-LVL I: ICD-10-PCS | Mod: PBBFAC,,,

## 2021-05-19 PROCEDURE — 99999 PR PBB SHADOW E&M-EST. PATIENT-LVL I: CPT | Mod: PBBFAC,,,

## 2021-05-19 RX ORDER — DM/P-EPHED/ACETAMINOPH/DOXYLAM 30-7.5/3
2 LIQUID (ML) ORAL
Qty: 288 LOZENGE | Refills: 0 | Status: SHIPPED | OUTPATIENT
Start: 2021-05-19 | End: 2021-12-06

## 2021-05-19 RX ORDER — NICOTINE 7MG/24HR
1 PATCH, TRANSDERMAL 24 HOURS TRANSDERMAL DAILY
Qty: 14 PATCH | Refills: 1 | Status: SHIPPED | OUTPATIENT
Start: 2021-05-19 | End: 2021-12-06

## 2021-05-26 ENCOUNTER — CLINICAL SUPPORT (OUTPATIENT)
Dept: SMOKING CESSATION | Facility: CLINIC | Age: 46
End: 2021-05-26
Payer: COMMERCIAL

## 2021-05-26 DIAGNOSIS — F17.200 NICOTINE DEPENDENCE: Primary | ICD-10-CM

## 2021-05-26 PROCEDURE — 99999 PR PBB SHADOW E&M-EST. PATIENT-LVL II: ICD-10-PCS | Mod: PBBFAC,,,

## 2021-05-26 PROCEDURE — 99402 PR PREVENT COUNSEL,INDIV,30 MIN: ICD-10-PCS | Mod: S$GLB,,, | Performed by: GENERAL PRACTICE

## 2021-05-26 PROCEDURE — 99999 PR PBB SHADOW E&M-EST. PATIENT-LVL II: CPT | Mod: PBBFAC,,,

## 2021-05-26 PROCEDURE — 99402 PREV MED CNSL INDIV APPRX 30: CPT | Mod: S$GLB,,, | Performed by: GENERAL PRACTICE

## 2021-05-26 RX ORDER — DM/P-EPHED/ACETAMINOPH/DOXYLAM 30-7.5/3
2 LIQUID (ML) ORAL
Qty: 288 LOZENGE | Refills: 0 | Status: SHIPPED | OUTPATIENT
Start: 2021-05-26 | End: 2021-12-06

## 2021-06-02 ENCOUNTER — CLINICAL SUPPORT (OUTPATIENT)
Dept: SMOKING CESSATION | Facility: CLINIC | Age: 46
End: 2021-06-02
Payer: COMMERCIAL

## 2021-06-02 ENCOUNTER — TELEPHONE (OUTPATIENT)
Dept: SMOKING CESSATION | Facility: CLINIC | Age: 46
End: 2021-06-02

## 2021-06-02 DIAGNOSIS — F17.200 NICOTINE DEPENDENCE: Primary | ICD-10-CM

## 2021-06-02 PROCEDURE — 99999 PR PBB SHADOW E&M-EST. PATIENT-LVL II: ICD-10-PCS | Mod: PBBFAC,,,

## 2021-06-02 PROCEDURE — 99999 PR PBB SHADOW E&M-EST. PATIENT-LVL II: CPT | Mod: PBBFAC,,,

## 2021-06-02 PROCEDURE — 99402 PR PREVENT COUNSEL,INDIV,30 MIN: ICD-10-PCS | Mod: S$GLB,,, | Performed by: GENERAL PRACTICE

## 2021-06-02 PROCEDURE — 99402 PREV MED CNSL INDIV APPRX 30: CPT | Mod: S$GLB,,, | Performed by: GENERAL PRACTICE

## 2021-06-02 RX ORDER — DM/P-EPHED/ACETAMINOPH/DOXYLAM 30-7.5/3
2 LIQUID (ML) ORAL
Qty: 100 LOZENGE | Refills: 1 | Status: SHIPPED | OUTPATIENT
Start: 2021-06-02 | End: 2021-12-06

## 2021-06-10 ENCOUNTER — CLINICAL SUPPORT (OUTPATIENT)
Dept: SMOKING CESSATION | Facility: CLINIC | Age: 46
End: 2021-06-10
Payer: COMMERCIAL

## 2021-06-10 DIAGNOSIS — F17.200 NICOTINE DEPENDENCE: Primary | ICD-10-CM

## 2021-06-10 PROCEDURE — 99402 PR PREVENT COUNSEL,INDIV,30 MIN: ICD-10-PCS | Mod: S$GLB,,, | Performed by: GENERAL PRACTICE

## 2021-06-10 PROCEDURE — 99999 PR PBB SHADOW E&M-EST. PATIENT-LVL II: ICD-10-PCS | Mod: PBBFAC,,,

## 2021-06-10 PROCEDURE — 99402 PREV MED CNSL INDIV APPRX 30: CPT | Mod: S$GLB,,, | Performed by: GENERAL PRACTICE

## 2021-06-10 PROCEDURE — 99999 PR PBB SHADOW E&M-EST. PATIENT-LVL II: CPT | Mod: PBBFAC,,,

## 2021-06-10 RX ORDER — IBUPROFEN 200 MG
1 TABLET ORAL DAILY
Qty: 14 PATCH | Refills: 1 | Status: SHIPPED | OUTPATIENT
Start: 2021-06-10 | End: 2021-12-06

## 2021-06-17 ENCOUNTER — CLINICAL SUPPORT (OUTPATIENT)
Dept: SMOKING CESSATION | Facility: CLINIC | Age: 46
End: 2021-06-17
Payer: COMMERCIAL

## 2021-06-17 DIAGNOSIS — F17.200 NICOTINE DEPENDENCE: Primary | ICD-10-CM

## 2021-06-17 PROCEDURE — 99999 PR PBB SHADOW E&M-EST. PATIENT-LVL II: CPT | Mod: PBBFAC,,,

## 2021-06-17 PROCEDURE — 99999 PR PBB SHADOW E&M-EST. PATIENT-LVL II: ICD-10-PCS | Mod: PBBFAC,,,

## 2021-06-17 PROCEDURE — 99402 PR PREVENT COUNSEL,INDIV,30 MIN: ICD-10-PCS | Mod: S$GLB,,, | Performed by: GENERAL PRACTICE

## 2021-06-17 PROCEDURE — 99402 PREV MED CNSL INDIV APPRX 30: CPT | Mod: S$GLB,,, | Performed by: GENERAL PRACTICE

## 2021-06-24 ENCOUNTER — CLINICAL SUPPORT (OUTPATIENT)
Dept: SMOKING CESSATION | Facility: CLINIC | Age: 46
End: 2021-06-24
Payer: COMMERCIAL

## 2021-06-24 DIAGNOSIS — F17.200 NICOTINE DEPENDENCE: Primary | ICD-10-CM

## 2021-06-24 PROCEDURE — 99402 PREV MED CNSL INDIV APPRX 30: CPT | Mod: S$GLB,,, | Performed by: GENERAL PRACTICE

## 2021-06-24 PROCEDURE — 99999 PR PBB SHADOW E&M-EST. PATIENT-LVL II: CPT | Mod: PBBFAC,,,

## 2021-06-24 PROCEDURE — 99402 PR PREVENT COUNSEL,INDIV,30 MIN: ICD-10-PCS | Mod: S$GLB,,, | Performed by: GENERAL PRACTICE

## 2021-06-24 PROCEDURE — 99999 PR PBB SHADOW E&M-EST. PATIENT-LVL II: ICD-10-PCS | Mod: PBBFAC,,,

## 2021-07-08 DIAGNOSIS — I10 BENIGN ESSENTIAL HYPERTENSION: ICD-10-CM

## 2021-07-12 RX ORDER — AMLODIPINE AND VALSARTAN 10; 320 MG/1; MG/1
1 TABLET ORAL DAILY
Qty: 90 TABLET | Refills: 0 | Status: SHIPPED | OUTPATIENT
Start: 2021-07-12 | End: 2021-12-06

## 2021-08-14 ENCOUNTER — PATIENT MESSAGE (OUTPATIENT)
Dept: INTERNAL MEDICINE | Facility: CLINIC | Age: 46
End: 2021-08-14

## 2021-08-19 ENCOUNTER — TELEPHONE (OUTPATIENT)
Dept: INTERNAL MEDICINE | Facility: CLINIC | Age: 46
End: 2021-08-19

## 2021-10-12 ENCOUNTER — CLINICAL SUPPORT (OUTPATIENT)
Dept: SMOKING CESSATION | Facility: CLINIC | Age: 46
End: 2021-10-12
Payer: COMMERCIAL

## 2021-10-12 DIAGNOSIS — F17.200 NICOTINE DEPENDENCE: Primary | ICD-10-CM

## 2021-10-12 PROCEDURE — 99406 BEHAV CHNG SMOKING 3-10 MIN: CPT | Mod: S$GLB,,,

## 2021-10-12 PROCEDURE — 99406 PR TOBACCO USE CESSATION INTERMEDIATE 3-10 MINUTES: ICD-10-PCS | Mod: S$GLB,,,

## 2021-12-06 ENCOUNTER — PATIENT MESSAGE (OUTPATIENT)
Dept: ADMINISTRATIVE | Facility: OTHER | Age: 46
End: 2021-12-06
Payer: MEDICAID

## 2021-12-06 ENCOUNTER — OFFICE VISIT (OUTPATIENT)
Dept: INTERNAL MEDICINE | Facility: CLINIC | Age: 46
End: 2021-12-06
Payer: MEDICAID

## 2021-12-06 VITALS
WEIGHT: 227.31 LBS | HEART RATE: 84 BPM | SYSTOLIC BLOOD PRESSURE: 130 MMHG | OXYGEN SATURATION: 96 % | DIASTOLIC BLOOD PRESSURE: 86 MMHG | HEIGHT: 66 IN | BODY MASS INDEX: 36.53 KG/M2 | TEMPERATURE: 99 F

## 2021-12-06 DIAGNOSIS — Z12.31 BREAST CANCER SCREENING BY MAMMOGRAM: ICD-10-CM

## 2021-12-06 DIAGNOSIS — Z23 NEED FOR 23-POLYVALENT PNEUMOCOCCAL POLYSACCHARIDE VACCINE: ICD-10-CM

## 2021-12-06 DIAGNOSIS — K21.9 GASTRO-ESOPHAGEAL REFLUX DISEASE WITHOUT ESOPHAGITIS: Chronic | ICD-10-CM

## 2021-12-06 DIAGNOSIS — Z28.21 IMMUNIZATION NOT CARRIED OUT BECAUSE OF PATIENT REFUSAL: ICD-10-CM

## 2021-12-06 DIAGNOSIS — F17.290 NICOTINE DEPENDENCE, OTHER TOBACCO PRODUCT, UNCOMPLICATED: ICD-10-CM

## 2021-12-06 DIAGNOSIS — D50.0 IRON DEFICIENCY ANEMIA DUE TO CHRONIC BLOOD LOSS: ICD-10-CM

## 2021-12-06 DIAGNOSIS — Z01.419 PAP SMEAR, AS PART OF ROUTINE GYNECOLOGICAL EXAMINATION: ICD-10-CM

## 2021-12-06 DIAGNOSIS — Z00.01 ENCOUNTER FOR WELL ADULT EXAM WITH ABNORMAL FINDINGS: Primary | ICD-10-CM

## 2021-12-06 DIAGNOSIS — E66.01 CLASS 2 SEVERE OBESITY DUE TO EXCESS CALORIES WITH SERIOUS COMORBIDITY AND BODY MASS INDEX (BMI) OF 36.0 TO 36.9 IN ADULT: ICD-10-CM

## 2021-12-06 DIAGNOSIS — Z23 NEED FOR INFLUENZA VACCINATION: ICD-10-CM

## 2021-12-06 DIAGNOSIS — Z23 NEED FOR COVID-19 VACCINE: ICD-10-CM

## 2021-12-06 DIAGNOSIS — I10 BENIGN ESSENTIAL HYPERTENSION: ICD-10-CM

## 2021-12-06 LAB
ALBUMIN SERPL BCP-MCNC: 3.9 G/DL (ref 3.5–5.2)
ALP SERPL-CCNC: 56 U/L (ref 55–135)
ALT SERPL W/O P-5'-P-CCNC: 12 U/L (ref 10–44)
ANION GAP SERPL CALC-SCNC: 8 MMOL/L (ref 8–16)
AST SERPL-CCNC: 13 U/L (ref 10–40)
BILIRUB SERPL-MCNC: 0.5 MG/DL (ref 0.1–1)
BUN SERPL-MCNC: 11 MG/DL (ref 6–20)
CALCIUM SERPL-MCNC: 10.1 MG/DL (ref 8.7–10.5)
CHLORIDE SERPL-SCNC: 108 MMOL/L (ref 95–110)
CHOLEST SERPL-MCNC: 190 MG/DL (ref 120–199)
CHOLEST/HDLC SERPL: 5.1 {RATIO} (ref 2–5)
CO2 SERPL-SCNC: 25 MMOL/L (ref 23–29)
CREAT SERPL-MCNC: 0.7 MG/DL (ref 0.5–1.4)
EST. GFR  (AFRICAN AMERICAN): >60 ML/MIN/1.73 M^2
EST. GFR  (NON AFRICAN AMERICAN): >60 ML/MIN/1.73 M^2
GLUCOSE SERPL-MCNC: 83 MG/DL (ref 70–110)
HDLC SERPL-MCNC: 37 MG/DL (ref 40–75)
HDLC SERPL: 19.5 % (ref 20–50)
LDLC SERPL CALC-MCNC: 131.2 MG/DL (ref 63–159)
NONHDLC SERPL-MCNC: 153 MG/DL
POTASSIUM SERPL-SCNC: 4.3 MMOL/L (ref 3.5–5.1)
PROT SERPL-MCNC: 6.6 G/DL (ref 6–8.4)
SODIUM SERPL-SCNC: 141 MMOL/L (ref 136–145)
TRIGL SERPL-MCNC: 109 MG/DL (ref 30–150)

## 2021-12-06 PROCEDURE — 80061 LIPID PANEL: CPT | Performed by: FAMILY MEDICINE

## 2021-12-06 PROCEDURE — 4010F PR ACE/ARB THEARPY RXD/TAKEN: ICD-10-PCS | Mod: CPTII,,, | Performed by: FAMILY MEDICINE

## 2021-12-06 PROCEDURE — 4010F ACE/ARB THERAPY RXD/TAKEN: CPT | Mod: CPTII,,, | Performed by: FAMILY MEDICINE

## 2021-12-06 PROCEDURE — 80053 COMPREHEN METABOLIC PANEL: CPT | Performed by: FAMILY MEDICINE

## 2021-12-06 PROCEDURE — 99396 PREV VISIT EST AGE 40-64: CPT | Mod: S$PBB,,, | Performed by: FAMILY MEDICINE

## 2021-12-06 PROCEDURE — 99999 PR PBB SHADOW E&M-EST. PATIENT-LVL IV: ICD-10-PCS | Mod: PBBFAC,,, | Performed by: FAMILY MEDICINE

## 2021-12-06 PROCEDURE — 99214 OFFICE O/P EST MOD 30 MIN: CPT | Mod: PBBFAC | Performed by: FAMILY MEDICINE

## 2021-12-06 PROCEDURE — 99396 PR PREVENTIVE VISIT,EST,40-64: ICD-10-PCS | Mod: S$PBB,,, | Performed by: FAMILY MEDICINE

## 2021-12-06 PROCEDURE — 99999 PR PBB SHADOW E&M-EST. PATIENT-LVL IV: CPT | Mod: PBBFAC,,, | Performed by: FAMILY MEDICINE

## 2021-12-06 RX ORDER — VALSARTAN 80 MG/1
80 TABLET ORAL DAILY
Qty: 90 TABLET | Refills: 3 | Status: SHIPPED | OUTPATIENT
Start: 2021-12-06 | End: 2021-12-28 | Stop reason: ALTCHOICE

## 2022-01-13 ENCOUNTER — HOSPITAL ENCOUNTER (OUTPATIENT)
Dept: RADIOLOGY | Facility: HOSPITAL | Age: 47
Discharge: HOME | End: 2022-01-13
Attending: FAMILY MEDICINE
Payer: MEDICAID

## 2022-01-13 VITALS — BODY MASS INDEX: 36.48 KG/M2 | WEIGHT: 227 LBS | HEIGHT: 66 IN

## 2022-01-13 DIAGNOSIS — Z12.31 BREAST CANCER SCREENING BY MAMMOGRAM: ICD-10-CM

## 2022-01-13 DIAGNOSIS — Z00.01 ENCOUNTER FOR WELL ADULT EXAM WITH ABNORMAL FINDINGS: ICD-10-CM

## 2022-01-13 PROCEDURE — 77067 SCR MAMMO BI INCL CAD: CPT | Mod: 26,,, | Performed by: RADIOLOGY

## 2022-01-13 PROCEDURE — 77063 BREAST TOMOSYNTHESIS BI: CPT | Mod: 26,,, | Performed by: RADIOLOGY

## 2022-01-13 PROCEDURE — 77063 MAMMO DIGITAL SCREENING BILAT WITH TOMO: ICD-10-PCS | Mod: 26,,, | Performed by: RADIOLOGY

## 2022-01-13 PROCEDURE — 77067 MAMMO DIGITAL SCREENING BILAT WITH TOMO: ICD-10-PCS | Mod: 26,,, | Performed by: RADIOLOGY

## 2022-01-13 PROCEDURE — 77067 SCR MAMMO BI INCL CAD: CPT | Mod: TC

## 2022-01-17 NOTE — PROGRESS NOTES
Adeel Araujo.    I am happy to report that your recent breast imaging did NOT show evidence of cancer.    An annual mammogram is the best test to screen for breast cancer, but it is not perfect, and it can miss some cancers. So, even though your mammogram was normal, if you notice any lump or change in one of your breasts, please schedule an appointment with me for a proper evaluation.    Thanks for letting me care for you, and thanks for trusting [facility] with your healthcare needs.    Sincerely,    DAISY Leigh MD

## 2022-02-07 ENCOUNTER — TELEPHONE (OUTPATIENT)
Dept: INTERNAL MEDICINE | Facility: CLINIC | Age: 47
End: 2022-02-07
Payer: MEDICAID

## 2022-02-07 NOTE — TELEPHONE ENCOUNTER
----- Message from Carmen Evans sent at 2/5/2022  8:00 AM CST -----  Contact: Patient [phone]  No blue slot available to schedule an appointment for the patient.  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 Drained, fainting , nausea , always afraid.  Would the patient like a call back, or a response through their [facility] portal?: pt portal    See [facility] appt request below:         Appointment Request From: Adeel Gordon    With Provider: KAREN Leigh MD [[address]]    Preferred Date Range: 2/7/2022 - 2/7/2022    Preferred Times: Any Time    Reason for visit: Office Visit    Comments:  Hello.  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 Drained, fainting , nausea , always afraid.

## 2022-02-07 NOTE — TELEPHONE ENCOUNTER
Spoke with patient and scheduled her an appointment with Josette Davidson PA-C tomorrow 2/8/22 at 10:00 AM. She verbalized understanding.

## 2022-02-08 ENCOUNTER — OFFICE VISIT (OUTPATIENT)
Dept: INTERNAL MEDICINE | Facility: CLINIC | Age: 47
End: 2022-02-08
Payer: MEDICAID

## 2022-02-08 VITALS
OXYGEN SATURATION: 96 % | WEIGHT: 229.06 LBS | TEMPERATURE: 98 F | SYSTOLIC BLOOD PRESSURE: 128 MMHG | BODY MASS INDEX: 36.81 KG/M2 | HEIGHT: 66 IN | HEART RATE: 89 BPM | DIASTOLIC BLOOD PRESSURE: 98 MMHG

## 2022-02-08 DIAGNOSIS — F41.0 PANIC ATTACK: Primary | ICD-10-CM

## 2022-02-08 DIAGNOSIS — Z00.00 ANNUAL PHYSICAL EXAM: ICD-10-CM

## 2022-02-08 DIAGNOSIS — F41.1 GENERALIZED ANXIETY DISORDER: ICD-10-CM

## 2022-02-08 PROCEDURE — 3074F SYST BP LT 130 MM HG: CPT | Mod: CPTII,,, | Performed by: PHYSICIAN ASSISTANT

## 2022-02-08 PROCEDURE — 1160F PR REVIEW ALL MEDS BY PRESCRIBER/CLIN PHARMACIST DOCUMENTED: ICD-10-PCS | Mod: CPTII,,, | Performed by: PHYSICIAN ASSISTANT

## 2022-02-08 PROCEDURE — 3080F PR MOST RECENT DIASTOLIC BLOOD PRESSURE >= 90 MM HG: ICD-10-PCS | Mod: CPTII,,, | Performed by: PHYSICIAN ASSISTANT

## 2022-02-08 PROCEDURE — 3080F DIAST BP >= 90 MM HG: CPT | Mod: CPTII,,, | Performed by: PHYSICIAN ASSISTANT

## 2022-02-08 PROCEDURE — 1159F PR MEDICATION LIST DOCUMENTED IN MEDICAL RECORD: ICD-10-PCS | Mod: CPTII,,, | Performed by: PHYSICIAN ASSISTANT

## 2022-02-08 PROCEDURE — 1159F MED LIST DOCD IN RCRD: CPT | Mod: CPTII,,, | Performed by: PHYSICIAN ASSISTANT

## 2022-02-08 PROCEDURE — 3008F BODY MASS INDEX DOCD: CPT | Mod: CPTII,,, | Performed by: PHYSICIAN ASSISTANT

## 2022-02-08 PROCEDURE — 99999 PR PBB SHADOW E&M-EST. PATIENT-LVL IV: ICD-10-PCS | Mod: PBBFAC,,, | Performed by: PHYSICIAN ASSISTANT

## 2022-02-08 PROCEDURE — 99214 OFFICE O/P EST MOD 30 MIN: CPT | Mod: PBBFAC | Performed by: PHYSICIAN ASSISTANT

## 2022-02-08 PROCEDURE — 3074F PR MOST RECENT SYSTOLIC BLOOD PRESSURE < 130 MM HG: ICD-10-PCS | Mod: CPTII,,, | Performed by: PHYSICIAN ASSISTANT

## 2022-02-08 PROCEDURE — 99214 OFFICE O/P EST MOD 30 MIN: CPT | Mod: S$PBB,,, | Performed by: PHYSICIAN ASSISTANT

## 2022-02-08 PROCEDURE — 99999 PR PBB SHADOW E&M-EST. PATIENT-LVL IV: CPT | Mod: PBBFAC,,, | Performed by: PHYSICIAN ASSISTANT

## 2022-02-08 PROCEDURE — 99214 PR OFFICE/OUTPT VISIT, EST, LEVL IV, 30-39 MIN: ICD-10-PCS | Mod: S$PBB,,, | Performed by: PHYSICIAN ASSISTANT

## 2022-02-08 PROCEDURE — 1160F RVW MEDS BY RX/DR IN RCRD: CPT | Mod: CPTII,,, | Performed by: PHYSICIAN ASSISTANT

## 2022-02-08 PROCEDURE — 3008F PR BODY MASS INDEX (BMI) DOCUMENTED: ICD-10-PCS | Mod: CPTII,,, | Performed by: PHYSICIAN ASSISTANT

## 2022-02-08 RX ORDER — SERTRALINE HYDROCHLORIDE 50 MG/1
50 TABLET, FILM COATED ORAL DAILY
Qty: 30 TABLET | Refills: 1 | Status: SHIPPED | OUTPATIENT
Start: 2022-02-08 | End: 2022-03-08 | Stop reason: DRUGHIGH

## 2022-02-08 NOTE — PROGRESS NOTES
Subjective:      Patient ID: Adeel Gordon is a 46 y.o. female.    Chief Complaint: medical issues    Anxiety  Presents for initial visit. Episode onset: few months. The problem has been gradually worsening. Symptoms include chest pain, decreased concentration, dizziness (feeling like she is going to faint), excessive worry, hyperventilation, insomnia, nausea, nervous/anxious behavior, obsessions, palpitations, panic and restlessness. Patient reports no compulsions, confusion, depressed mood, dry mouth, feeling of choking, impotence, irritability, malaise, muscle tension, shortness of breath or suicidal ideas. Symptoms occur most days. The severity of symptoms is causing significant distress and interfering with daily activities. Exacerbated by: worry about covid. The quality of sleep is poor. Nighttime awakenings: several.     There is no history of anemia, anxiety/panic attacks, arrhythmia, asthma, bipolar disorder, CAD, CHF, chronic lung disease, depression, fibromyalgia, hyperthyroidism or suicide attempts. Past treatments include nothing.   Dizziness:   Chronicity:  New  Onset:  More than 1 month ago  Progression since onset:  Waxing and waning  Severity:  Disabling  Duration:  5 minutes  Dizziness characteristics:  Lightheaded/impending faint   Associated symptoms: nausea, visual disturbances, light-headedness, palpitations, panic and chest pain.no hearing loss, no ear congestion, no ear pain, no fever, no headaches, no tinnitus, no vomiting, no diaphoresis, no aural fullness, no weakness, no syncope, no facial weakness, no slurred speech and no numbness in extremities.   Blurred vision  Aggravated by:  Nothing  Treatments tried:  Nothing   PMH includes: anxiety.no strokes, no cardiac surgery, no neurologic disease, no head trauma, no balance testing, no ear trauma, no ear surgery, no head trauma, no ear infections, no ear tubes, no environmental allergies, no MRI head and no CT head.  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   Has not been treated for anxiety before.   Anxiety has been worsening because kids at school have been testing positive for covid.   Denies any thoughts of hurting herself or others.     Patient Active Problem List   Diagnosis    Benign essential hypertension    Class 2 (severe) obesity due to excess calories with serious comorbidity and body mass index (BMI) of 36.0 to 36.9 in adult    Iron deficiency anemia due to chronic blood loss    Gastro-esophageal reflux disease without esophagitis    S/P tubal ligation    History of endometrial ablation    Nicotine dependence, other tobacco product, uncomplicated    At increased risk for Influenza and Pneumococcal infections due to unvaccinated state AMA       Current Outpatient Medications:     amlodipine-valsartan (EXFORGE)  mg per tablet, Take 1 tablet by mouth once daily., Disp: 90 tablet, Rfl: 1    sertraline (ZOLOFT) 50 MG tablet, Take 1 tablet (50 mg total) by mouth once daily., Disp: 30 tablet, Rfl: 1    Health Maintenance Due   Topic Date Due    Pneumococcal Vaccines (Age 0-64) (1 of 2 - PPSV23) Never done    Colorectal Cancer Screening  Never done    COVID-19 Vaccine (2 - Booster for Mee series) 05/08/2021    Cervical Cancer Screening  08/17/2021    Influenza Vaccine (1) 09/01/2021     -also overdue for GYN visit. Pt requesting apt    Review of Systems   Constitutional: Negative for activity change, appetite change, chills, diaphoresis, fatigue, fever, irritability and unexpected weight change.   HENT: Negative.  Negative for congestion, ear pain, hearing loss, postnasal drip, rhinorrhea, sore throat, tinnitus, trouble swallowing and voice change.    Eyes: Negative.  Negative for visual disturbance.   Respiratory: Negative.  Negative for cough, choking, chest tightness and shortness of breath.    Cardiovascular: Positive for chest pain and palpitations. Negative for leg  "swelling and syncope.   Gastrointestinal: Positive for nausea. Negative for abdominal distention, abdominal pain, blood in stool, constipation, diarrhea and vomiting.   Endocrine: Negative for cold intolerance, heat intolerance, polydipsia and polyuria.   Genitourinary: Negative.  Negative for difficulty urinating, frequency and impotence.   Musculoskeletal: Negative for arthralgias, back pain, gait problem, joint swelling and myalgias.   Skin: Negative for color change, pallor, rash and wound.   Allergic/Immunologic: Negative for environmental allergies.   Neurological: Positive for dizziness (feeling like she is going to faint) and light-headedness. Negative for tremors, weakness, numbness and headaches.   Hematological: Negative for adenopathy.   Psychiatric/Behavioral: Positive for decreased concentration, dysphoric mood and sleep disturbance. Negative for agitation, behavioral problems, confusion, hallucinations, self-injury and suicidal ideas. The patient is nervous/anxious and has insomnia. The patient is not hyperactive.      Objective:   BP (!) 128/98 (BP Location: Right arm, Patient Position: Sitting, BP Method: Large (Manual))   Pulse 89   Temp 98.3 °F (36.8 °C) (Tympanic)   Ht 5' 6" (1.676 m)   Wt 103.9 kg (229 lb 0.9 oz)   SpO2 96%   BMI 36.97 kg/m²     Physical Exam  Vitals and nursing note reviewed.   Constitutional:       General: She is not in acute distress.     Appearance: Normal appearance. She is well-developed and well-nourished. She is not ill-appearing, toxic-appearing or diaphoretic.   HENT:      Head: Normocephalic and atraumatic.   Cardiovascular:      Rate and Rhythm: Normal rate and regular rhythm.      Pulses: Intact distal pulses.      Heart sounds: Normal heart sounds. No murmur heard.  No friction rub. No gallop.    Pulmonary:      Effort: Pulmonary effort is normal. No respiratory distress.      Breath sounds: Normal breath sounds. No wheezing or rales.   Musculoskeletal:    "      General: Normal range of motion.   Skin:     General: Skin is warm.      Capillary Refill: Capillary refill takes less than 2 seconds.      Findings: No rash.   Neurological:      Mental Status: She is alert and oriented to person, place, and time.   Psychiatric:         Attention and Perception: Attention and perception normal.         Mood and Affect: Affect normal. Mood is anxious.         Speech: Speech normal.         Behavior: Behavior normal.         Thought Content: Thought content normal. Thought content is not paranoid or delusional. Thought content does not include homicidal or suicidal ideation. Thought content does not include homicidal or suicidal plan.         Cognition and Memory: Cognition and memory normal.         Judgment: Judgment normal.         Assessment:     1. Panic attack    2. Generalized anxiety disorder    3. Annual physical exam      Plan:   Panic attack  -     sertraline (ZOLOFT) 50 MG tablet; Take 1 tablet (50 mg total) by mouth once daily.  Dispense: 30 tablet; Refill: 1  -     Ambulatory referral/consult to Psychology; Future; Expected date: 02/15/2022    Generalized anxiety disorder  -     sertraline (ZOLOFT) 50 MG tablet; Take 1 tablet (50 mg total) by mouth once daily.  Dispense: 30 tablet; Refill: 1  -     Ambulatory referral/consult to Psychology; Future; Expected date: 02/15/2022  -diagnosis and treatment options discussed in detail.   -pt requesting to be off work for some time. I agree'd to give her the rest of the week off to give the rx time to kick in. Message me with an update next week.   -agree to start zoloft and try therapy.   -printed out a list of psychologists in network with her insurance for her to try.    Annual physical exam  -     Ambulatory referral/consult to Gynecology; Future; Expected date: 02/15/2022       -off for rest of week to get adjusted to med    Follow up in about 4 weeks (around 3/8/2022), or if symptoms worsen or fail to improve.

## 2022-02-08 NOTE — PATIENT INSTRUCTIONS
've listed below some mental health providers whom I believe are accepting new patients with your insurance. You should be able to get the mental health care you need at one of the centers listed below. Please contact them ASAP to schedule your first appointment. Tell them you were referred by your primary care provider.    [facility]  [address], [place] [address]  Phone: [phone]  Web:  [email or web address]    [facility]  [address], [address]  [place] [address]  Phone: [phone]  Web: [email or web address]    [facility] (Satellite Location)  [address]  [address], [place] [address]  Phone: [phone]  Web: [email or web address]    [facility]  [address]  [place] [address]   Phone: [phone]    [facility]  [address] [place] [address]  Phone: [phone]  Web: [email or web address]    [facility]  [address]  [place] [address]  Phone: [phone]  Web: [email or web address]    [facility]  [address] [place] [address]  Phone: [phone]  Web: [email or web address]    [facility]  [address] [place] [place] [address]  Phone: [phone]  Web: [email or web address]    IMPORANT: If you experience an emotional crisis, go to the nearest emergency room or call the crisis intervention center at [phone].

## 2022-02-14 ENCOUNTER — PATIENT MESSAGE (OUTPATIENT)
Dept: INTERNAL MEDICINE | Facility: CLINIC | Age: 47
End: 2022-02-14
Payer: MEDICAID

## 2022-02-14 ENCOUNTER — TELEPHONE (OUTPATIENT)
Dept: INTERNAL MEDICINE | Facility: CLINIC | Age: 47
End: 2022-02-14
Payer: MEDICAID

## 2022-02-14 DIAGNOSIS — R55 NEAR SYNCOPE: Primary | ICD-10-CM

## 2022-02-14 DIAGNOSIS — R42 EPISODIC LIGHTHEADEDNESS: ICD-10-CM

## 2022-02-14 DIAGNOSIS — R55 SYNCOPE, NEAR: ICD-10-CM

## 2022-02-14 DIAGNOSIS — F41.0 PANIC ATTACK: Primary | ICD-10-CM

## 2022-02-14 NOTE — TELEPHONE ENCOUNTER
She is describing near-syncope.  Although this may be a panic attack, she requires cardiac work up.    TO MY TEAM:   · Please contact her and help her schedule cardiology appointment ASAP.  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    Thanks!     Orders Placed This Encounter   Procedures    Ambulatory referral/consult to Cardiology

## 2022-02-16 ENCOUNTER — PATIENT OUTREACH (OUTPATIENT)
Dept: ADMINISTRATIVE | Facility: OTHER | Age: 47
End: 2022-02-16
Payer: MEDICAID

## 2022-02-16 DIAGNOSIS — Z12.11 ENCOUNTER FOR FIT (FECAL IMMUNOCHEMICAL TEST) SCREENING: Primary | ICD-10-CM

## 2022-02-17 ENCOUNTER — OFFICE VISIT (OUTPATIENT)
Dept: CARDIOLOGY | Facility: CLINIC | Age: 47
End: 2022-02-17
Payer: MEDICAID

## 2022-02-17 ENCOUNTER — HOSPITAL ENCOUNTER (OUTPATIENT)
Dept: CARDIOLOGY | Facility: HOSPITAL | Age: 47
Discharge: HOME | End: 2022-02-17
Attending: INTERNAL MEDICINE
Payer: MEDICAID

## 2022-02-17 VITALS
SYSTOLIC BLOOD PRESSURE: 112 MMHG | OXYGEN SATURATION: 99 % | WEIGHT: 227.06 LBS | BODY MASS INDEX: 36.65 KG/M2 | HEART RATE: 76 BPM | DIASTOLIC BLOOD PRESSURE: 90 MMHG

## 2022-02-17 DIAGNOSIS — E66.01 CLASS 2 SEVERE OBESITY DUE TO EXCESS CALORIES WITH SERIOUS COMORBIDITY AND BODY MASS INDEX (BMI) OF 36.0 TO 36.9 IN ADULT: ICD-10-CM

## 2022-02-17 DIAGNOSIS — Z76.89 ESTABLISHING CARE WITH NEW DOCTOR, ENCOUNTER FOR: ICD-10-CM

## 2022-02-17 DIAGNOSIS — I10 BENIGN ESSENTIAL HYPERTENSION: ICD-10-CM

## 2022-02-17 DIAGNOSIS — R55 NEAR SYNCOPE: ICD-10-CM

## 2022-02-17 DIAGNOSIS — E78.5 HYPERLIPIDEMIA, UNSPECIFIED HYPERLIPIDEMIA TYPE: Primary | ICD-10-CM

## 2022-02-17 DIAGNOSIS — R00.2 PALPITATIONS: ICD-10-CM

## 2022-02-17 DIAGNOSIS — F17.290 NICOTINE DEPENDENCE, OTHER TOBACCO PRODUCT, UNCOMPLICATED: ICD-10-CM

## 2022-02-17 DIAGNOSIS — K21.9 GASTRO-ESOPHAGEAL REFLUX DISEASE WITHOUT ESOPHAGITIS: ICD-10-CM

## 2022-02-17 DIAGNOSIS — Z76.89 ESTABLISHING CARE WITH NEW DOCTOR, ENCOUNTER FOR: Primary | ICD-10-CM

## 2022-02-17 DIAGNOSIS — R07.9 CHEST PAIN, UNSPECIFIED TYPE: ICD-10-CM

## 2022-02-17 PROCEDURE — 3080F DIAST BP >= 90 MM HG: CPT | Mod: CPTII,,, | Performed by: INTERNAL MEDICINE

## 2022-02-17 PROCEDURE — 93005 ELECTROCARDIOGRAM TRACING: CPT

## 2022-02-17 PROCEDURE — 3074F SYST BP LT 130 MM HG: CPT | Mod: CPTII,,, | Performed by: INTERNAL MEDICINE

## 2022-02-17 PROCEDURE — 99213 OFFICE O/P EST LOW 20 MIN: CPT | Mod: PBBFAC | Performed by: INTERNAL MEDICINE

## 2022-02-17 PROCEDURE — 3080F PR MOST RECENT DIASTOLIC BLOOD PRESSURE >= 90 MM HG: ICD-10-PCS | Mod: CPTII,,, | Performed by: INTERNAL MEDICINE

## 2022-02-17 PROCEDURE — 93010 ELECTROCARDIOGRAM REPORT: CPT | Mod: ,,, | Performed by: INTERNAL MEDICINE

## 2022-02-17 PROCEDURE — 3008F PR BODY MASS INDEX (BMI) DOCUMENTED: ICD-10-PCS | Mod: CPTII,,, | Performed by: INTERNAL MEDICINE

## 2022-02-17 PROCEDURE — 93010 EKG 12-LEAD: ICD-10-PCS | Mod: ,,, | Performed by: INTERNAL MEDICINE

## 2022-02-17 PROCEDURE — 99205 OFFICE O/P NEW HI 60 MIN: CPT | Mod: S$PBB,,, | Performed by: INTERNAL MEDICINE

## 2022-02-17 PROCEDURE — 3074F PR MOST RECENT SYSTOLIC BLOOD PRESSURE < 130 MM HG: ICD-10-PCS | Mod: CPTII,,, | Performed by: INTERNAL MEDICINE

## 2022-02-17 PROCEDURE — 99999 PR PBB SHADOW E&M-EST. PATIENT-LVL III: ICD-10-PCS | Mod: PBBFAC,,, | Performed by: INTERNAL MEDICINE

## 2022-02-17 PROCEDURE — 1159F PR MEDICATION LIST DOCUMENTED IN MEDICAL RECORD: ICD-10-PCS | Mod: CPTII,,, | Performed by: INTERNAL MEDICINE

## 2022-02-17 PROCEDURE — 99205 PR OFFICE/OUTPT VISIT, NEW, LEVL V, 60-74 MIN: ICD-10-PCS | Mod: S$PBB,,, | Performed by: INTERNAL MEDICINE

## 2022-02-17 PROCEDURE — 3008F BODY MASS INDEX DOCD: CPT | Mod: CPTII,,, | Performed by: INTERNAL MEDICINE

## 2022-02-17 PROCEDURE — 1159F MED LIST DOCD IN RCRD: CPT | Mod: CPTII,,, | Performed by: INTERNAL MEDICINE

## 2022-02-17 PROCEDURE — 99999 PR PBB SHADOW E&M-EST. PATIENT-LVL III: CPT | Mod: PBBFAC,,, | Performed by: INTERNAL MEDICINE

## 2022-02-17 NOTE — PROGRESS NOTES
"  Subjective:   Patient ID:  Adeel Gordon is a 46 y.o. female who presents for evaluation of No chief complaint on file.      HPI  45 yo female, since 18 to 40 yo a pack a day , 22 PQY, stopped vaping two months ago, no alcohol , no family history of CAD  Comes in for evaluation of multiple complaints, palpitations, near syncope, dizziness, Dyspnea and headaches   Palpitations for the last 6 months  , last night last time, twice a week, woke her up once from her sleep  With chest pain 10/10 last month, lasted for 10 mins, resolved on its with deep breathing,   She also gets " non serious chest pains" , twice a week , for the last 6 months, 6/10, last for 30 mins or more sometimes, sometimes worse or brought up by acitivity, states she had COVID 6 months ago without need for ED visit   PATINO, with NYHA 2, she has trace to 1 + bilateral lower ext swelling, also for the last 6 months   She gets dizziness with near fainting and nausea with blurry vision , twice a week, in any circumstance standing or sitting, no LOC, Never when laying    Tested once before for sleep apnea IN 2018, ahi 2.8   + TEA   She is worried her episodes of near syncope, blurry vision and dizziness happen frequently and worried about driving a school bus   Past Medical History:   Diagnosis Date    Class 1 obesity due to excess calories with serious comorbidity and body mass index (BMI) of 34.0 to 34.9 in adult 12/3/2015    Class 2 severe obesity due to excess calories with serious comorbidity and body mass index (BMI) of 38.0 to 38.9 in adult 12/3/2015    Depression     not taking meds     History of endometrial ablation 07/2017    Hypertension     Obesity     S/P tubal ligation 07/2017    Severe obesity with body mass index (BMI) of 36.0 to 36.9 with serious comorbidity 12/3/2015    Tobacco use disorder        Past Surgical History:   Procedure Laterality Date    ENDOMETRIAL ABLATION  02/28/2017    TUBAL LIGATION  02/28/2017    " VAGINAL DELIVERY      x 3       Social History     Tobacco Use    Smoking status: Current Every Day Smoker     Packs/day: 0.20     Years: 20.00     Pack years: 4.00     Types: Vaping with nicotine    Smokeless tobacco: Never Used    Tobacco comment: Vape pen >10 x / day   Substance Use Topics    Alcohol use: No     Alcohol/week: 0.0 standard drinks    Drug use: No       Family History   Problem Relation Age of Onset    No Known Problems Sister     No Known Problems Sister     No Known Problems Brother     Kidney disease Son     Hypertension Son     Cancer Father         THROAT    Hypertension Father     Hypertension Mother        Review of Systems   Constitutional: Negative for fever and malaise/fatigue.   HENT: Negative for sore throat.    Eyes: Negative for blurred vision.   Cardiovascular: Positive for chest pain, dyspnea on exertion, irregular heartbeat, leg swelling, near-syncope and palpitations. Negative for claudication, cyanosis, orthopnea, paroxysmal nocturnal dyspnea and syncope.   Respiratory: Negative for cough and hemoptysis.    Hematologic/Lymphatic: Negative for bleeding problem.   Skin: Negative for rash.   Musculoskeletal: Negative for falls.   Gastrointestinal: Negative for abdominal pain.   Genitourinary: Negative.    Neurological: Negative.    Psychiatric/Behavioral: Negative for altered mental status and substance abuse.       Current Outpatient Medications on File Prior to Visit   Medication Sig    amlodipine-valsartan (EXFORGE)  mg per tablet Take 1 tablet by mouth once daily.    sertraline (ZOLOFT) 50 MG tablet Take 1 tablet (50 mg total) by mouth once daily.     No current facility-administered medications on file prior to visit.       Objective:   Objective:  Wt Readings from Last 3 Encounters:   02/17/22 103 kg (227 lb 1.2 oz)   02/08/22 103.9 kg (229 lb 0.9 oz)   01/13/22 103 kg (227 lb)     Temp Readings from Last 3 Encounters:   02/08/22 98.3 °F (36.8 °C)  (Tympanic)   12/06/21 98.9 °F (37.2 °C) (Tympanic)   10/22/20 97.5 °F (36.4 °C) (Tympanic)     BP Readings from Last 3 Encounters:   02/17/22 (!) 112/90   02/08/22 (!) 128/98   12/06/21 130/86     Pulse Readings from Last 3 Encounters:   02/17/22 76   02/08/22 89   12/06/21 84       Physical Exam  Vitals reviewed.   Constitutional:       Appearance: She is well-developed and well-nourished.   HENT:      Head: Normocephalic and atraumatic.   Eyes:      General: No scleral icterus.     Conjunctiva/sclera: Conjunctivae normal.   Cardiovascular:      Rate and Rhythm: Normal rate and regular rhythm.      Pulses: Intact distal pulses.      Heart sounds: Normal heart sounds. No murmur heard.      Pulmonary:      Effort: No respiratory distress.      Breath sounds: No wheezing or rales.   Chest:      Chest wall: No tenderness.   Abdominal:      General: Bowel sounds are normal. There is no distension.      Palpations: Abdomen is soft.      Tenderness: There is no guarding.   Musculoskeletal:         General: Normal range of motion.      Cervical back: Normal range of motion and neck supple.   Skin:     General: Skin is warm.   Neurological:      Mental Status: She is alert and oriented to person, place, and time.   Psychiatric:         Mood and Affect: Mood and affect normal.         Lab Results   Component Value Date    CHOL 190 12/06/2021    CHOL 143 10/22/2020    CHOL 190 09/13/2019     Lab Results   Component Value Date    HDL 37 (L) 12/06/2021    HDL 33 (L) 10/22/2020    HDL 32 (L) 09/13/2019     Lab Results   Component Value Date    LDLCALC 131.2 12/06/2021    LDLCALC 82.4 10/22/2020    LDLCALC 111.2 09/13/2019     Lab Results   Component Value Date    TRIG 109 12/06/2021    TRIG 138 10/22/2020    TRIG 234 (H) 09/13/2019     Lab Results   Component Value Date    CHOLHDL 19.5 (L) 12/06/2021    CHOLHDL 23.1 10/22/2020    CHOLHDL 16.8 (L) 09/13/2019       Chemistry        Component Value Date/Time     12/06/2021  1132    K 4.3 12/06/2021 1132     12/06/2021 1132    CO2 25 12/06/2021 1132    BUN 11 12/06/2021 1132    CREATININE 0.7 12/06/2021 1132    GLU 83 12/06/2021 1132        Component Value Date/Time    CALCIUM 10.1 12/06/2021 1132    ALKPHOS 56 12/06/2021 1132    AST 13 12/06/2021 1132    ALT 12 12/06/2021 1132    BILITOT 0.5 12/06/2021 1132    ESTGFRAFRICA >60.0 12/06/2021 1132    EGFRNONAA >60.0 12/06/2021 1132          Lab Results   Component Value Date    TSH 0.910 09/13/2019     No results found for: INR, PROTIME  Lab Results   Component Value Date    WBC 7.43 10/22/2020    HGB 12.1 10/22/2020    HCT 37.5 10/22/2020     (H) 10/22/2020     10/22/2020     BNP  @LABRCNTIP(BNP,BNPTRIAGEBLO)@  CrCl cannot be calculated (Patient's most recent lab result is older than the maximum 7 days allowed.).     Imaging:  ======  No results found for this or any previous visit.    No results found for this or any previous visit.    No results found for this or any previous visit.    No results found for this or any previous visit.    No results found for this or any previous visit.    No valid procedures specified.    Diagnostic Results:  ECG: Reviewed  NSR, INFERIOR INFARCT AGE UNDETERMINED,   Q wave with twi inferior leads     The 10-year ASCVD risk score ([place] MARKO Jr., et al., 2013) is: 5.1%    Values used to calculate the score:      Age: 46 years      Sex: Female      Is Non- : Yes      Diabetic: No      Tobacco smoker: Yes      Systolic Blood Pressure: 112 mmHg      Is BP treated: Yes      HDL Cholesterol: 37 mg/dL      Total Cholesterol: 190 mg/dL    Assessment and Plan:   Hyperlipidemia, unspecified hyperlipidemia type    Near syncope  -     Ambulatory referral/consult to Cardiology  -     Holter monitor - 48 hour; Future  -     Echo; Future    Benign essential hypertension  -     Echo; Future    Nicotine dependence, other tobacco product, uncomplicated    Class 2 (severe) obesity  due to excess calories with serious comorbidity and body mass index (BMI) of 36.0 to 36.9 in adult    Gastro-esophageal reflux disease without esophagitis    Chest pain, unspecified type  -     Nuclear Stress - Cardiology Interpreted; Future  -     Echo; Future    Palpitations  -     Holter monitor - 48 hour; Future  -     Echo; Future    Reviewed all tests and above medical conditions with patient in detail and formulated treatment plan.  Risk factor modification discussed.   Cardiac low salt diet discussed.  Maintaining healthy weight and weight loss goals were discussed in clinic.  Monitor BP at episodes of HTN     Follow up in 6 MONTHS

## 2022-02-21 ENCOUNTER — HOSPITAL ENCOUNTER (OUTPATIENT)
Dept: CARDIOLOGY | Facility: HOSPITAL | Age: 47
Discharge: HOME | End: 2022-02-21
Attending: INTERNAL MEDICINE
Payer: MEDICAID

## 2022-02-21 DIAGNOSIS — R55 NEAR SYNCOPE: ICD-10-CM

## 2022-02-21 DIAGNOSIS — I10 BENIGN ESSENTIAL HYPERTENSION: ICD-10-CM

## 2022-02-21 DIAGNOSIS — R07.9 CHEST PAIN, UNSPECIFIED TYPE: ICD-10-CM

## 2022-02-21 DIAGNOSIS — R00.2 PALPITATIONS: ICD-10-CM

## 2022-02-21 LAB
AV INDEX (PROSTH): 0.82
AV MEAN GRADIENT: 5 MMHG
AV PEAK GRADIENT: 8 MMHG
AV VALVE AREA: 2.88 CM2
AV VELOCITY RATIO: 0.89
CV ECHO LV RWT: 0.4 CM
DOP CALC AO PEAK VEL: 1.45 M/S
DOP CALC AO VTI: 31.8 CM
DOP CALC LVOT AREA: 3.5 CM2
DOP CALC LVOT DIAMETER: 2.11 CM
DOP CALC LVOT PEAK VEL: 1.29 M/S
DOP CALC LVOT STROKE VOLUME: 91.57 CM3
DOP CALC RVOT PEAK VEL: 0.71 M/S
DOP CALC RVOT VTI: 16.6 CM
DOP CALCLVOT PEAK VEL VTI: 26.2 CM
E WAVE DECELERATION TIME: 180.04 MSEC
E/A RATIO: 0.98
E/E' RATIO: 10.35 M/S
ECHO EF ESTIMATED: 75 %
ECHO LV POSTERIOR WALL: 0.99 CM (ref 0.6–1.1)
EJECTION FRACTION: 60 %
FRACTIONAL SHORTENING: 43 % (ref 28–44)
INTERVENTRICULAR SEPTUM: 1.03 CM (ref 0.6–1.1)
IVRT: 77.07 MSEC
LA MAJOR: 4.97 CM
LA MINOR: 4.57 CM
LA WIDTH: 4.14 CM
LEFT ATRIUM SIZE: 3.81 CM
LEFT ATRIUM VOLUME: 63.84 CM3
LEFT INTERNAL DIMENSION IN SYSTOLE: 2.78 CM (ref 2.1–4)
LEFT VENTRICLE DIASTOLIC VOLUME: 114.02 ML
LEFT VENTRICLE SYSTOLIC VOLUME: 28.91 ML
LEFT VENTRICULAR INTERNAL DIMENSION IN DIASTOLE: 4.92 CM (ref 3.5–6)
LEFT VENTRICULAR MASS: 179.61 G
LV LATERAL E/E' RATIO: 9.78 M/S
LV SEPTAL E/E' RATIO: 11 M/S
LVOT MG: 3.22 MMHG
LVOT MV: 0.82 CM/S
MV PEAK A VEL: 0.9 M/S
MV PEAK E VEL: 0.88 M/S
MV STENOSIS PRESSURE HALF TIME: 52.21 MS
MV VALVE AREA P 1/2 METHOD: 4.21 CM2
PISA TR MAX VEL: 2.58 M/S
PULM VEIN S/D RATIO: 1.4
PV MEAN GRADIENT: 1.01 MMHG
PV PEAK D VEL: 0.47 M/S
PV PEAK S VEL: 0.66 M/S
PV PEAK VELOCITY: 0.99 CM/S
RA MAJOR: 4.89 CM
RA PRESSURE: 3 MMHG
RA WIDTH: 3.09 CM
RIGHT VENTRICULAR END-DIASTOLIC DIMENSION: 2.9 CM
SINUS: 3.24 CM
STJ: 3.26 CM
TDI LATERAL: 0.09 M/S
TDI SEPTAL: 0.08 M/S
TDI: 0.09 M/S
TR MAX PG: 27 MMHG
TRICUSPID ANNULAR PLANE SYSTOLIC EXCURSION: 1.96 CM
TV REST PULMONARY ARTERY PRESSURE: 30 MMHG

## 2022-02-21 PROCEDURE — 93306 TTE W/DOPPLER COMPLETE: CPT

## 2022-02-21 PROCEDURE — 93306 TTE W/DOPPLER COMPLETE: CPT | Mod: 26,,, | Performed by: INTERNAL MEDICINE

## 2022-02-21 PROCEDURE — 93306 ECHO (CUPID ONLY): ICD-10-PCS | Mod: 26,,, | Performed by: INTERNAL MEDICINE

## 2022-02-25 ENCOUNTER — HOSPITAL ENCOUNTER (OUTPATIENT)
Dept: RADIOLOGY | Facility: HOSPITAL | Age: 47
Discharge: HOME | End: 2022-02-25
Attending: INTERNAL MEDICINE
Payer: MEDICAID

## 2022-02-25 ENCOUNTER — HOSPITAL ENCOUNTER (OUTPATIENT)
Dept: CARDIOLOGY | Facility: HOSPITAL | Age: 47
Discharge: HOME | End: 2022-02-25
Attending: INTERNAL MEDICINE
Payer: MEDICAID

## 2022-02-25 DIAGNOSIS — R07.9 CHEST PAIN, UNSPECIFIED TYPE: ICD-10-CM

## 2022-02-25 LAB
CV STRESS BASE HR: 70 BPM
DIASTOLIC BLOOD PRESSURE: 92 MMHG
NUC REST EJECTION FRACTION: 78
NUC STRESS EJECTION FRACTION: 81 %
OHS CV CPX 85 PERCENT MAX PREDICTED HEART RATE MALE: 140
OHS CV CPX MAX PREDICTED HEART RATE: 165
OHS CV CPX PATIENT IS FEMALE: 1
OHS CV CPX PATIENT IS MALE: 0
OHS CV CPX PEAK DIASTOLIC BLOOD PRESSURE: 94 MMHG
OHS CV CPX PEAK HEAR RATE: 137 BPM
OHS CV CPX PEAK RATE PRESSURE PRODUCT: NORMAL
OHS CV CPX PEAK SYSTOLIC BLOOD PRESSURE: 135 MMHG
OHS CV CPX PERCENT MAX PREDICTED HEART RATE ACHIEVED: 83
OHS CV CPX RATE PRESSURE PRODUCT PRESENTING: 8120
STRESS ECHO POST EXERCISE DUR MIN: 7 MINUTES
STRESS ECHO POST EXERCISE DUR SEC: 40 SECONDS
SYSTOLIC BLOOD PRESSURE: 116 MMHG

## 2022-02-25 PROCEDURE — 93016 STRESS TEST WITH MYOCARDIAL PERFUSION (CUPID ONLY): ICD-10-PCS | Mod: ,,, | Performed by: INTERNAL MEDICINE

## 2022-02-25 PROCEDURE — 93016 CV STRESS TEST SUPVJ ONLY: CPT | Mod: ,,, | Performed by: INTERNAL MEDICINE

## 2022-02-25 PROCEDURE — A9502 TC99M TETROFOSMIN: HCPCS

## 2022-02-25 PROCEDURE — 78452 HT MUSCLE IMAGE SPECT MULT: CPT | Mod: 26,,, | Performed by: INTERNAL MEDICINE

## 2022-02-25 PROCEDURE — 78452 STRESS TEST WITH MYOCARDIAL PERFUSION (CUPID ONLY): ICD-10-PCS | Mod: 26,,, | Performed by: INTERNAL MEDICINE

## 2022-02-25 PROCEDURE — 93018 STRESS TEST WITH MYOCARDIAL PERFUSION (CUPID ONLY): ICD-10-PCS | Mod: ,,, | Performed by: INTERNAL MEDICINE

## 2022-02-25 PROCEDURE — 93018 CV STRESS TEST I&R ONLY: CPT | Mod: ,,, | Performed by: INTERNAL MEDICINE

## 2022-02-25 PROCEDURE — 93017 CV STRESS TEST TRACING ONLY: CPT

## 2022-02-25 PROCEDURE — 63600175 PHARM REV CODE 636 W HCPCS: Performed by: INTERNAL MEDICINE

## 2022-02-25 RX ORDER — REGADENOSON 0.08 MG/ML
0.4 INJECTION, SOLUTION INTRAVENOUS ONCE
Status: COMPLETED | OUTPATIENT
Start: 2022-02-25 | End: 2022-02-25

## 2022-02-25 RX ORDER — AMINOPHYLLINE 25 MG/ML
75 INJECTION, SOLUTION INTRAVENOUS ONCE
Status: COMPLETED | OUTPATIENT
Start: 2022-02-25 | End: 2022-02-25

## 2022-02-25 RX ADMIN — REGADENOSON 0.4 MG: 0.08 INJECTION, SOLUTION INTRAVENOUS at 09:02

## 2022-02-25 RX ADMIN — AMINOPHYLLINE 75 MG: 25 INJECTION, SOLUTION INTRAVENOUS at 09:02

## 2022-03-08 ENCOUNTER — OFFICE VISIT (OUTPATIENT)
Dept: INTERNAL MEDICINE | Facility: CLINIC | Age: 47
End: 2022-03-08
Payer: MEDICAID

## 2022-03-08 VITALS
BODY MASS INDEX: 36.7 KG/M2 | WEIGHT: 228.38 LBS | SYSTOLIC BLOOD PRESSURE: 124 MMHG | HEIGHT: 66 IN | HEART RATE: 88 BPM | TEMPERATURE: 98 F | OXYGEN SATURATION: 99 % | RESPIRATION RATE: 17 BRPM | DIASTOLIC BLOOD PRESSURE: 86 MMHG

## 2022-03-08 DIAGNOSIS — E66.01 CLASS 2 SEVERE OBESITY DUE TO EXCESS CALORIES WITH SERIOUS COMORBIDITY AND BODY MASS INDEX (BMI) OF 36.0 TO 36.9 IN ADULT: ICD-10-CM

## 2022-03-08 DIAGNOSIS — F41.1 GENERALIZED ANXIETY DISORDER WITH PANIC ATTACKS: ICD-10-CM

## 2022-03-08 DIAGNOSIS — I10 BENIGN ESSENTIAL HYPERTENSION: Primary | ICD-10-CM

## 2022-03-08 DIAGNOSIS — Z01.419 PAP SMEAR, AS PART OF ROUTINE GYNECOLOGICAL EXAMINATION: ICD-10-CM

## 2022-03-08 DIAGNOSIS — F41.0 GENERALIZED ANXIETY DISORDER WITH PANIC ATTACKS: ICD-10-CM

## 2022-03-08 DIAGNOSIS — D50.0 IRON DEFICIENCY ANEMIA DUE TO CHRONIC BLOOD LOSS: ICD-10-CM

## 2022-03-08 PROCEDURE — 3008F PR BODY MASS INDEX (BMI) DOCUMENTED: ICD-10-PCS | Mod: CPTII,,, | Performed by: FAMILY MEDICINE

## 2022-03-08 PROCEDURE — 99214 OFFICE O/P EST MOD 30 MIN: CPT | Mod: PBBFAC | Performed by: FAMILY MEDICINE

## 2022-03-08 PROCEDURE — 4010F ACE/ARB THERAPY RXD/TAKEN: CPT | Mod: CPTII,,, | Performed by: FAMILY MEDICINE

## 2022-03-08 PROCEDURE — 3079F PR MOST RECENT DIASTOLIC BLOOD PRESSURE 80-89 MM HG: ICD-10-PCS | Mod: CPTII,,, | Performed by: FAMILY MEDICINE

## 2022-03-08 PROCEDURE — 3074F PR MOST RECENT SYSTOLIC BLOOD PRESSURE < 130 MM HG: ICD-10-PCS | Mod: CPTII,,, | Performed by: FAMILY MEDICINE

## 2022-03-08 PROCEDURE — 3079F DIAST BP 80-89 MM HG: CPT | Mod: CPTII,,, | Performed by: FAMILY MEDICINE

## 2022-03-08 PROCEDURE — 99214 PR OFFICE/OUTPT VISIT, EST, LEVL IV, 30-39 MIN: ICD-10-PCS | Mod: S$PBB,,, | Performed by: FAMILY MEDICINE

## 2022-03-08 PROCEDURE — 3074F SYST BP LT 130 MM HG: CPT | Mod: CPTII,,, | Performed by: FAMILY MEDICINE

## 2022-03-08 PROCEDURE — 99214 OFFICE O/P EST MOD 30 MIN: CPT | Mod: S$PBB,,, | Performed by: FAMILY MEDICINE

## 2022-03-08 PROCEDURE — 99999 PR PBB SHADOW E&M-EST. PATIENT-LVL IV: ICD-10-PCS | Mod: PBBFAC,,, | Performed by: FAMILY MEDICINE

## 2022-03-08 PROCEDURE — 4010F PR ACE/ARB THEARPY RXD/TAKEN: ICD-10-PCS | Mod: CPTII,,, | Performed by: FAMILY MEDICINE

## 2022-03-08 PROCEDURE — 99999 PR PBB SHADOW E&M-EST. PATIENT-LVL IV: CPT | Mod: PBBFAC,,, | Performed by: FAMILY MEDICINE

## 2022-03-08 PROCEDURE — 3008F BODY MASS INDEX DOCD: CPT | Mod: CPTII,,, | Performed by: FAMILY MEDICINE

## 2022-03-08 RX ORDER — SERTRALINE HYDROCHLORIDE 100 MG/1
100 TABLET, FILM COATED ORAL DAILY
Qty: 30 TABLET | Refills: 3 | Status: SHIPPED | OUTPATIENT
Start: 2022-03-08 | End: 2022-07-02 | Stop reason: SDUPTHER

## 2022-03-08 NOTE — ASSESSMENT & PLAN NOTE
BP Readings from Last 6 Encounters:   03/08/22 124/86   02/17/22 (!) 112/90   02/08/22 (!) 128/98   12/06/21 130/86   12/15/20 128/80   10/22/20 120/80      Last 5 Patient Entered Readings                Current 30 Day Average: 126/82  Recent Readings 2/15/2022 12/7/2021 12/7/2021 12/6/2021    SBP (mmHg) 126 154 159 151    DBP (mmHg) 82 108 111 110    Pulse 74 78 80 77                  Lab Results   Component Value Date    ESTGFRAFRICA >60.0 12/06/2021    EGFRNONAA >60.0 12/06/2021    CREATININE 0.7 12/06/2021    BUN 11 12/06/2021    K 4.3 12/06/2021     12/06/2021     12/06/2021     Results for orders placed or performed during the hospital encounter of 02/17/22   EKG 12-lead    Collection Time: 02/17/22  9:31 AM    Narrative    Test Reason : Z76.89    Vent. Rate : 078 BPM     Atrial Rate : 078 BPM     P-R Int : 152 ms          QRS Dur : 088 ms      QT Int : 368 ms       P-R-T Axes : 052 020 -08 degrees     QTc Int : 419 ms    Normal sinus rhythm  Inferior infarct ,age undetermined  Cannot rule out Anterior infarct ,age undetermined  Abnormal ECG  When compared with ECG of 10-FEB-2017 15:31,  Inferior infarct is now Present  T wave inversion now evident in Inferior leads  Confirmed by KARINA GILLESPIE MD ([id number]) on 2/17/2022 11:19:01 PM    Referred By: OLGA SELBY           Confirmed By:KARINA GILLESPIE MD

## 2022-03-08 NOTE — Clinical Note
Dr. Josep Araujo.  Adeel has appointment with you on Thursday.  I increased her sertraline dose to 100 mg, but I told her to not start the new dose until she sees you.  Message me if you have any concerns or recommendations.  Thank you for your help caring for our patients!  -LM

## 2022-03-08 NOTE — PROGRESS NOTES
OFFICE VISIT 3/8/22 11:00 AM CST  LAST ENCOUNTER WITH ME:  12/6/2021     HEALTH MAINTENANCE INTERVENTIONS - UP TO DATE  Health Maintenance Topics with due status: Not Due       Topic Last Completion Date    TETANUS VACCINE 11/10/2016    Lipid Panel 12/06/2021    Mammogram 01/13/2022     HEALTH MAINTENANCE INTERVENTIONS - DUE OR DUE SOON  Health Maintenance Due   Topic Date Due    Pneumococcal Vaccines (Age 0-64) (1 of 2 - PPSV23) Never done    Colorectal Cancer Screening  Never done    COVID-19 Vaccine (2 - Booster for Mee series) 05/08/2021    Cervical Cancer Screening  08/17/2021    Influenza Vaccine (1) 09/01/2021     CHIEF COMPLAINT: Follow-up    DIAGNOSES SPECIFICALLY EVALUATED AND TREATED THIS ENCOUNTER  1. Benign essential hypertension  This is a chronic problem, stable. Adeel appears to be doing well on her amlodipine/valsartan. Adeel reports preceiving no adverse side-effects and wants to continue current treatment.     2. Generalized anxiety disorder with panic attacks  This is a chronic problem, stable. Adeel appears to be doing well on her sertraline. Adeel reports preceiving no adverse side-effects and wants to continue current treatment.   - sertraline (ZOLOFT) 100 MG tablet; Take 1 tablet (100 mg total) by mouth once daily.  Dispense: 30 tablet; Refill: 3    3. Class 2 (severe) obesity due to excess calories with serious comorbidity and body mass index (BMI) of 36.0 to 36.9 in adult  This is a chronic problem, stable. Therapeutic lifestyle changes encouraged.    4. Iron deficiency anemia due to chronic blood loss    5. Pap smear, as part of routine gynecological examination  - Ambulatory referral/consult to Gynecology; Future     Follow up in about 13 weeks (around 6/7/2022) for re-evaluate problem(s) discussed today.    Future Appointments   Date Time Provider Department Center   3/9/2022  9:30 AM HOLTER, [facility] [place]   3/10/2022  9:00 AM Anne Car, PhD,  MPAP [facility] PSYCH [facility]   6/7/2022 10:00 AM DAISY Leigh MD [facility]   9/8/2022 10:00 AM Olga Selby MD [facility] CARDIO [facility]        Problem List Items Addressed This Visit     Benign essential hypertension - Primary (Chronic)    Current Assessment & Plan     BP Readings from Last 6 Encounters:   03/08/22 124/86   02/17/22 (!) 112/90   02/08/22 (!) 128/98   12/06/21 130/86   12/15/20 128/80   10/22/20 120/80      Last 5 Patient Entered Readings                Current 30 Day Average: 126/82  Recent Readings 2/15/2022 12/7/2021 12/7/2021 12/6/2021    SBP (mmHg) 126 154 159 151    DBP (mmHg) 82 108 111 110    Pulse 74 78 80 77                  Lab Results   Component Value Date    ESTGFRAFRICA >60.0 12/06/2021    EGFRNONAA >60.0 12/06/2021    CREATININE 0.7 12/06/2021    BUN 11 12/06/2021    K 4.3 12/06/2021     12/06/2021     12/06/2021     Results for orders placed or performed during the hospital encounter of 02/17/22   EKG 12-lead    Collection Time: 02/17/22  9:31 AM    Narrative    Test Reason : Z76.89    Vent. Rate : 078 BPM     Atrial Rate : 078 BPM     P-R Int : 152 ms          QRS Dur : 088 ms      QT Int : 368 ms       P-R-T Axes : 052 020 -08 degrees     QTc Int : 419 ms    Normal sinus rhythm  Inferior infarct ,age undetermined  Cannot rule out Anterior infarct ,age undetermined  Abnormal ECG  When compared with ECG of 10-FEB-2017 15:31,  Inferior infarct is now Present  T wave inversion now evident in Inferior leads  Confirmed by KARINA GILLESPIE MD ([id number]) on 2/17/2022 11:19:01 PM    Referred By: OLGA SELBY           Confirmed By:KARINA GILLESPIE MD                Class 2 (severe) obesity due to excess calories with serious comorbidity and body mass index (BMI) of 36.0 to 36.9 in adult (Chronic)    Current Assessment & Plan     Wt Readings from Last 6 Encounters:   03/08/22 103.6 kg (228 lb 6.3 oz)   02/17/22 103 kg (227 lb 1.2 oz)   02/08/22 103.9 kg (229 lb 0.9 oz)  "  01/13/22 103 kg (227 lb)   12/06/21 103.1 kg (227 lb 4.7 oz)   12/15/20 95.5 kg (210 lb 8.6 oz)     Estimated body mass index is 36.86 kg/m² as calculated from the following:    Height as of this encounter: 5' 6" (1.676 m).    Weight as of this encounter: 103.6 kg (228 lb 6.3 oz).    Therapeutic lifestyle changes encouraged.            Generalized anxiety disorder with panic attacks (Chronic)    Relevant Medications    sertraline (ZOLOFT) 100 MG tablet    RESOLVED: Iron deficiency anemia due to chronic blood loss    Current Assessment & Plan     RESOLVED.  Lab Results   Component Value Date    HGB 12.1 10/22/2020    HGB 12.8 09/13/2019    HGB 14.0 07/13/2018    HGB 12.2 02/10/2017    HCT 37.5 10/22/2020    HCT 37.4 09/13/2019    HCT 43.5 07/13/2018    HCT 37.8 02/10/2017     Lab Results   Component Value Date    FERRITIN 177 09/13/2019    FERRITIN 66 07/13/2018    FERRITIN 7 (L) 11/10/2016    IRON 106 09/13/2019    IRON 105 07/13/2018    TRANSFERRIN 213 09/13/2019    TRANSFERRIN 227 07/13/2018    TIBC 315 09/13/2019    TIBC 336 07/13/2018    FESATURATED 34 09/13/2019    FESATURATED 31 07/13/2018                 Other Visit Diagnoses     Pap smear, as part of routine gynecological examination        Relevant Orders    Ambulatory referral/consult to Gynecology      Unless noted herein, any chronic conditions are represented as and appear compensated/controlled and stable, and no other significant complaints or concerns were reported.    PRESCRIPTION DRUG MANAGEMENT  Outpatient Medications Prior to Visit   Medication Sig Dispense Refill    amlodipine-valsartan (EXFORGE)  mg per tablet Take 1 tablet by mouth once daily. 90 tablet 1    sertraline (ZOLOFT) 50 MG tablet Take 1 tablet (50 mg total) by mouth once daily. 30 tablet 1     No facility-administered medications prior to visit.     Medications Discontinued During This Encounter   Medication Reason    sertraline (ZOLOFT) 50 MG tablet Dose adjustment " "    Medications Ordered This Encounter   Medications    sertraline (ZOLOFT) 100 MG tablet     Sig: Take 1 tablet (100 mg total) by mouth once daily.     Dispense:  30 tablet     Refill:  3     NOTE DOSE CHANGE. DISCONTINUE any prescription for this drug issued prior to 03/08/2022.     PHYSICAL EXAM  Vitals:    03/08/22 1046   BP: 124/86   Pulse: 88   Resp: 17   Temp: 98.4 °F (36.9 °C)   SpO2: 99%   Weight: 103.6 kg (228 lb 6.3 oz)   Height: 5' 6" (1.676 m)   CONSTITUTIONAL: Vital signs noted. No apparent distress. Does not appear acutely ill or septic. Appears adequately hydrated.  PULM: Lungs clear. Breathing unlabored.  HEART: Regular.  DERM: Skin warm and moist with normal turgor.  NEURO: There are no gross focal motor deficits.  PSYCHIATRIC: Alert and conversant. Mood grossly neutral. Judgment and insight grossly intact.    Documentation entered by me for this encounter may have been done in part using speech-recognition technology. Although I have made an effort to ensure accuracy, "sound like" errors may exist and should be interpreted in context.   "

## 2022-03-08 NOTE — ASSESSMENT & PLAN NOTE
RESOLVED.  Lab Results   Component Value Date    HGB 12.1 10/22/2020    HGB 12.8 09/13/2019    HGB 14.0 07/13/2018    HGB 12.2 02/10/2017    HCT 37.5 10/22/2020    HCT 37.4 09/13/2019    HCT 43.5 07/13/2018    HCT 37.8 02/10/2017     Lab Results   Component Value Date    FERRITIN 177 09/13/2019    FERRITIN 66 07/13/2018    FERRITIN 7 (L) 11/10/2016    IRON 106 09/13/2019    IRON 105 07/13/2018    TRANSFERRIN 213 09/13/2019    TRANSFERRIN 227 07/13/2018    TIBC 315 09/13/2019    TIBC 336 07/13/2018    FESATURATED 34 09/13/2019    FESATURATED 31 07/13/2018

## 2022-03-08 NOTE — ASSESSMENT & PLAN NOTE
"Wt Readings from Last 6 Encounters:   03/08/22 103.6 kg (228 lb 6.3 oz)   02/17/22 103 kg (227 lb 1.2 oz)   02/08/22 103.9 kg (229 lb 0.9 oz)   01/13/22 103 kg (227 lb)   12/06/21 103.1 kg (227 lb 4.7 oz)   12/15/20 95.5 kg (210 lb 8.6 oz)     Estimated body mass index is 36.86 kg/m² as calculated from the following:    Height as of this encounter: 5' 6" (1.676 m).    Weight as of this encounter: 103.6 kg (228 lb 6.3 oz).    Therapeutic lifestyle changes encouraged.   "

## 2022-03-09 ENCOUNTER — HOSPITAL ENCOUNTER (OUTPATIENT)
Dept: CARDIOLOGY | Facility: HOSPITAL | Age: 47
Discharge: HOME | End: 2022-03-09
Attending: INTERNAL MEDICINE
Payer: MEDICAID

## 2022-03-09 DIAGNOSIS — R00.2 PALPITATIONS: ICD-10-CM

## 2022-03-09 DIAGNOSIS — R55 NEAR SYNCOPE: ICD-10-CM

## 2022-03-09 PROCEDURE — 93227 XTRNL ECG REC<48 HR R&I: CPT | Mod: ,,, | Performed by: INTERNAL MEDICINE

## 2022-03-09 PROCEDURE — 93227 HOLTER MONITOR - 48 HOUR (CUPID ONLY): ICD-10-PCS | Mod: ,,, | Performed by: INTERNAL MEDICINE

## 2022-03-09 PROCEDURE — 93226 XTRNL ECG REC<48 HR SCAN A/R: CPT

## 2022-03-10 ENCOUNTER — OFFICE VISIT (OUTPATIENT)
Dept: PSYCHIATRY | Facility: CLINIC | Age: 47
End: 2022-03-10
Payer: MEDICAID

## 2022-03-10 ENCOUNTER — PATIENT OUTREACH (OUTPATIENT)
Dept: ADMINISTRATIVE | Facility: HOSPITAL | Age: 47
End: 2022-03-10
Payer: MEDICAID

## 2022-03-10 DIAGNOSIS — F32.9 MAJOR DEPRESSIVE DISORDER, REMISSION STATUS UNSPECIFIED, UNSPECIFIED WHETHER RECURRENT: ICD-10-CM

## 2022-03-10 DIAGNOSIS — F41.0 PANIC ATTACK: Primary | ICD-10-CM

## 2022-03-10 DIAGNOSIS — F41.1 GENERALIZED ANXIETY DISORDER: ICD-10-CM

## 2022-03-10 PROCEDURE — 99212 OFFICE O/P EST SF 10 MIN: CPT | Mod: PBBFAC | Performed by: PSYCHOLOGIST

## 2022-03-10 PROCEDURE — 1159F PR MEDICATION LIST DOCUMENTED IN MEDICAL RECORD: ICD-10-PCS | Mod: HP,HB,CPTII, | Performed by: PSYCHOLOGIST

## 2022-03-10 PROCEDURE — 90792 PSYCH DIAG EVAL W/MED SRVCS: CPT | Mod: HP,HB,, | Performed by: PSYCHOLOGIST

## 2022-03-10 PROCEDURE — 4010F ACE/ARB THERAPY RXD/TAKEN: CPT | Mod: HP,HB,CPTII, | Performed by: PSYCHOLOGIST

## 2022-03-10 PROCEDURE — 90792 PR PSYCHIATRIC DIAGNOSTIC EVALUATION W/MEDICAL SERVICES: ICD-10-PCS | Mod: HP,HB,, | Performed by: PSYCHOLOGIST

## 2022-03-10 PROCEDURE — 1159F MED LIST DOCD IN RCRD: CPT | Mod: HP,HB,CPTII, | Performed by: PSYCHOLOGIST

## 2022-03-10 PROCEDURE — 99999 PR PBB SHADOW E&M-EST. PATIENT-LVL II: ICD-10-PCS | Mod: PBBFAC,HB,, | Performed by: PSYCHOLOGIST

## 2022-03-10 PROCEDURE — 4010F PR ACE/ARB THEARPY RXD/TAKEN: ICD-10-PCS | Mod: HP,HB,CPTII, | Performed by: PSYCHOLOGIST

## 2022-03-10 PROCEDURE — 99999 PR PBB SHADOW E&M-EST. PATIENT-LVL II: CPT | Mod: PBBFAC,HB,, | Performed by: PSYCHOLOGIST

## 2022-03-10 NOTE — LETTER
March 10, 2022      Josette Davidson PA-C  [address]  [place] [address]         [facility]  [address] [facility]  [place] [address]  Phone: [phone]  Fax: [phone]   Patient: Adeel Gordon   MR Number: [id number]   YOB: 1975   Date of Visit: 3/10/2022     Dear Ms. Davidson:    Thank you for referring Adeel Gordon to me for evaluation. Below are the relevant portions of my assessment and plan of care.    Adeel has anxiety with apparent panic attacks, as well as depression symptoms. She has been utilizing coping skills (prayer/meditation) to help her panic, and the medicine has also helped to a degree. There are other possible underlying causes for her panic symptoms that are currently under investigation. She is agreeable to counseling.  · Medication Management: Discussed risks, benefits, and alternatives to treatment plan documented above with patient. I answered all patient questions related to this plan, and patient expressed understanding and agreement.   take Zoloft 50 mg tonight (took 50 mg this morning) and 100 mg nightly thereafter  · therapy referral in clinic   · Breathing/cognitive component for panic/anxiety; track frequency  Follow up in about 6 weeks (around 4/21/2022) for medication management.     If you have questions, please do not hesitate to call me. I look forward to following Adeel along with you.    Sincerely,    Anne Car, PhD, MPAP   Advanced Practice Medical Psychologist    JENNIFER Leigh MD

## 2022-03-10 NOTE — PATIENT INSTRUCTIONS
"[facility] OF [facility]   PATIENT INFORMATION    We appreciate the opportunity to participate in your medical care and hope the following protocols will make it easier for you to receive quality treatment in our department.    PUNCTUALITY: Your appointment is scheduled for a fixed amount of time, reserved especially for you.  To get the benefit of your appointment, please arrive at least 15 minutes early to allow time for traffic, parking and registration.  Should you arrive more than 15 minutes late to your appointment, you will be rescheduled in order to assure your clinician has adequate time to assess you and provide helpful care.      APPOINTMENTS: Appointments are made by the nursing/front office staff or through the patient portal. Providers do not have access  to schedule appointments. Walk in appointments are not available. FOR EMERGENCIES, PLEASE GO THE CLOSEST EMERGENCY ROOM.    CANCELLATION/MISSED APPOINTMENTS:   In order to receive quality care, all appointments must be kept.  If you are unable to keep an appointment, please reschedule at least 3 days prior if possible. Late cancellations (within 24 hours of the appointment) and repeated no-show appointments may result in dismissal from the clinic. After two no show/late cancellation visits, you will receive a notice letter, alerting you to keep visits to prevent department dismissal. If another visit is missed after receipt of the notice, you will be discharged from the clinic. This policy is in effect to allow for other individuals on a long waiting list to be seen as soon as possible. Unlike other branches of medicine where several individuals can be scheduled in a 30 minute time slot, only one individual can be scheduled in any time slot in Psychiatry.     MESSAGES: For simple questions/concerns, you may contact your individual providers electronically through the "[facility]" portal or by calling [phone] " with messages relayed via office staff. If relevant, include pharmacy name and phone number, date of last visit and next scheduled visit, phone number where you can be reached throughout the day, and whether leaving a voicemail or message on an answering machine is acceptable. Messages will be returned by the Medical Assistant or Office Staff after your provider has reviewed the message.  Please allow 24 hours for a returned message before leaving another message. Messages will be checked each workday (Monday through Friday) during office hours (8:00 a.m. and 5:00 p.m.) and returned at most within one business day.  You may leave a non-urgent message after hours. Note that psychotherapy and medication management are not appropriate by telephone or the patient portal.    PRESCRIPTION REFILLS:  Please communicate with your prescriber about any refills you need during your appointment. You may also request refills through the [facility] portal (preferred) or by calling the clinic. Prescriptions will be filled during office hours.     Please do not wait until you are completely out of medication to request refills. Same day refills are not always possible. Patients may experience symptoms of withdrawal if they run out of medications. The patient assumes all responsibility when there is an issue with non-compliance with follow-up appointments and medications.  Some medications are controlled and regulated by the FDA and [employer]. Some of these medications can not be refilled before 30 days and require a face to face appointment.     PAPERWORK REQUESTS: If you have any forms or letters that need to be completed by your doctor, please present these at the beginning of the appointment to ensure that information needed to complete them is obtained during the office visit. Paperwork will be returned within 7-10 business days. Staff will call you to  the paperwork when completed.    SPECIAL EVALUATIONS: Please note that our  "department is treatment-focused. As such, we focus on treatment-oriented evaluations and do not perform specialty or "forensic" evaluations. Examples are listed below.    Disability: We do not do disability evaluations.  Please contact Social Security Administration for evaluations and determinations. You will then sign releases allowing for records from your treatment providers to be forwarded to Social Security Administration to use in their evaluation.  Gun Permit: We do not offer Sound Judgment Evaluations or assessments leading to gun ownership, nor do we fill out or file paperwork relevant to owning, concealing or purchasing a firearm.  Emotional Support     Animals (MAYCOL): We do not provide documentation, including letters, to aid in the acclamation that an Emotional Support Animal is required. Note that ESAs are not trained to perform tasks or recognize particular signs or symptoms. Rather, they are distinguished by the close, emotional, and supportive bond between the animal and the owner.       SAMPLES: We do not provide samples of any medications. If you have financial difficulties and are on a limited income, you may qualify for Patient Assistance Programs from various pharmaceutical companies. This will require that you complete paperwork with your financial information, but this does not guarantee that the company will approve the application. Alternative medication options can be discussed.    REFERRALS/COORDINATION: You will be referred to other providers if we feel unable to adequately diagnose or treat your particular condition, or if collaboration with another provider would allow for better management of your condition.     This document is for information purposes only. Please refer to the full disclaimer and copyright statement available at http://www.cci.health.wa.gov.au regarding the information from this website before making use of such information.  See website www.cci.health.wa.gov.au " for more handouts and resources.    What is Sleep Hygiene?  Sleep hygiene is the term used to describe good sleep habits. Considerable research has gone into developing a set of guidelines and tips which are designed to enhance good sleeping, and there is much evidence to suggest that these strategies can provide long-term solutions to sleep difficulties. There are many medications which are used to treat insomnia,  but these tend to be only effective in the short-term. Ongoing use of sleeping pills may lead to dependence and interfere with developing good sleep habits independent of medication,  thereby prolonging sleep difficulties. Talk to your health professional about what is right for you, but we recommend good sleep hygiene as an important part of treating insomnia,  either with other strategies such as medication or cognitive therapy or alone.    Sleep Hygiene Tips  1) Get regular. One of the best ways to train your body to sleep well is to go to bed and get up at more or less the same time every day, even on weekends and days off! This regular rhythm will make you feel better and will give your body something to work from.  2) Sleep when sleepy. Only try to sleep when you actually feel tired or sleepy, rather than spending too much time awake in bed.  3) Get up & try again. If you havent been able to get to sleep after about 20 minutes or more, get up and do something calming or boring until you feel sleepy, then return to bed and try again. Sit quietly on the couch with the lights off (bright light will tell your brain that it is time to wake up), or read something boring like the phone book. Avoid doing anything that is too stimulating or interesting, as this will wake you up even more.  4) Avoid caffeine & nicotine. It is best to avoid consuming any caffeine (in coffee, tea, cola drinks, chocolate, and some medications) or nicotine (cigarettes) for at least 4-6 hours before going to bed. These  substances act as stimulants and interfere with the ability to fall asleep   5) Avoid alcohol. It is also best to avoid alcohol for at least 4-6 hours before going to bed. Many people believe that alcohol is relaxing and helps them to get to sleep at first, but it actually interrupts the quality of sleep.  6) Bed is for sleeping. Try not to use your bed for anything other than sleeping and sex, so that your body comes to associate bed with sleep. If you use bed as a place to watch TV, eat, read, work on your laptop, pay bills, and other things, your body will not learn this connection.  7) No naps. It is best to avoid taking naps during the day, to make sure that you are tired at bedtime. If you cant make it through the day without a nap, make sure it is for less than an hour and before 3pm.  8) Sleep rituals. You can develop your own rituals of things to remind your body that it is time to sleep - some people find it useful to do relaxing stretches or breathing exercises for 15 minutes before bed each night, or sit calmly with a cup of caffeine-free tea.  9) Bathtime. Having a hot bath 1-2 hours before bedtime can be useful, as it will raise your body temperature, causing you to feel sleepy as your body temperature drops again. Research shows that sleepiness is associated with a drop in body temperature.  10) No clock-watching. Many people who struggle with sleep tend to watch the clock too much. Frequently checking the clock during the night can wake you up (especially if you turn  on the light to read the time) and reinforces negative thoughts such as Oh no, look how late it is, Ill never get to sleep or its so early, I have only slept for 5 hours, this is  terrible.  11) Use a sleep diary. This worksheet can be a useful way of making sure you have the right facts about your sleep, rather than making assumptions. Because a diary involves watching  the clock (see point 10) it is a good idea to only use it  for two weeks to get an idea of what is going and then perhaps two months down the track to see how you are progressing.  12) Exercise. Regular exercise is a good idea to help with good sleep, but try not to do strenuous exercise in the 4 hours before bedtime. Morning walks are a great way to start the day feeling refreshed!  13) Eat right. A healthy, balanced diet will help you to sleep well, but timing is important. Some people find that a very empty stomach at bedtime is distracting, so it can be useful  to have a light snack, but a heavy meal soon before bed can also interrupt sleep. Some people recommend a warm glass of milk, which contains tryptophan, which acts as a natural  sleep inducer.  14) The right space. It is very important that your bed and bedroom are quiet and comfortable for sleeping. A cooler room with enough blankets to stay warm is best, and make sure you have curtains or an eyemask to block out early morning light and earplugs if there is noise outside your room.  15) Keep daytime routine the same. Even if you have a bad night sleep and are tired it is important that you try to keep your daytime activities the same as you had planned. That is,  dont avoid activities because you feel tired. This can reinforce the insomnia.     Call In if problems  Call Report Side Effects   Encouraged to follow up with primary care / Gen Med MD for continued monitoring of general health and wellness  Call [phone] Or go to ER if Acute Concerns (especially if any thoughts of harm to self or other)        Anne Car, PhD, MP  Advanced Practice Medical Psychologist  [facility]  [place] [address] [phone]   [phone] fax
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
## 2022-03-11 LAB
OHS CV EVENT MONITOR DAY: 0
OHS CV HOLTER LENGTH DECIMAL HOURS: 47.98
OHS CV HOLTER LENGTH HOURS: 47
OHS CV HOLTER LENGTH MINUTES: 59
OHS CV HOLTER SINUS AVERAGE HR: 85
OHS CV HOLTER SINUS MAX HR: 150
OHS CV HOLTER SINUS MIN HR: 62

## 2022-03-15 ENCOUNTER — TELEPHONE (OUTPATIENT)
Dept: SMOKING CESSATION | Facility: CLINIC | Age: 47
End: 2022-03-15
Payer: MEDICAID

## 2022-03-21 ENCOUNTER — PATIENT MESSAGE (OUTPATIENT)
Dept: ADMINISTRATIVE | Facility: HOSPITAL | Age: 47
End: 2022-03-21
Payer: MEDICAID

## 2022-03-28 PROBLEM — F41.0 PANIC ATTACK: Status: ACTIVE | Noted: 2022-03-28

## 2022-03-28 PROBLEM — F41.1 GENERALIZED ANXIETY DISORDER: Chronic | Status: ACTIVE | Noted: 2022-03-28

## 2022-03-28 PROBLEM — F41.0 GENERALIZED ANXIETY DISORDER WITH PANIC ATTACKS: Chronic | Status: ACTIVE | Noted: 2022-03-28

## 2022-03-28 PROBLEM — F41.1 GENERALIZED ANXIETY DISORDER: Status: ACTIVE | Noted: 2022-03-28

## 2022-03-28 PROBLEM — F41.0 PANIC ATTACK: Chronic | Status: ACTIVE | Noted: 2022-03-28

## 2022-04-08 ENCOUNTER — OFFICE VISIT (OUTPATIENT)
Dept: OBSTETRICS AND GYNECOLOGY | Facility: CLINIC | Age: 47
End: 2022-04-08
Payer: MEDICAID

## 2022-04-08 VITALS
BODY MASS INDEX: 37.67 KG/M2 | SYSTOLIC BLOOD PRESSURE: 140 MMHG | DIASTOLIC BLOOD PRESSURE: 98 MMHG | WEIGHT: 234.38 LBS | HEIGHT: 66 IN

## 2022-04-08 DIAGNOSIS — Z01.419 ENCOUNTER FOR ANNUAL ROUTINE GYNECOLOGICAL EXAMINATION: Primary | ICD-10-CM

## 2022-04-08 PROCEDURE — 99212 OFFICE O/P EST SF 10 MIN: CPT | Mod: PBBFAC,PN | Performed by: MIDWIFE

## 2022-04-08 PROCEDURE — 4010F PR ACE/ARB THEARPY RXD/TAKEN: ICD-10-PCS | Mod: CPTII,,, | Performed by: MIDWIFE

## 2022-04-08 PROCEDURE — 99396 PR PREVENTIVE VISIT,EST,40-64: ICD-10-PCS | Mod: TH,S$PBB,, | Performed by: MIDWIFE

## 2022-04-08 PROCEDURE — 99396 PREV VISIT EST AGE 40-64: CPT | Mod: TH,S$PBB,, | Performed by: MIDWIFE

## 2022-04-08 PROCEDURE — 3008F PR BODY MASS INDEX (BMI) DOCUMENTED: ICD-10-PCS | Mod: CPTII,,, | Performed by: MIDWIFE

## 2022-04-08 PROCEDURE — 3077F PR MOST RECENT SYSTOLIC BLOOD PRESSURE >= 140 MM HG: ICD-10-PCS | Mod: CPTII,,, | Performed by: MIDWIFE

## 2022-04-08 PROCEDURE — 3080F DIAST BP >= 90 MM HG: CPT | Mod: CPTII,,, | Performed by: MIDWIFE

## 2022-04-08 PROCEDURE — 4010F ACE/ARB THERAPY RXD/TAKEN: CPT | Mod: CPTII,,, | Performed by: MIDWIFE

## 2022-04-08 PROCEDURE — 3077F SYST BP >= 140 MM HG: CPT | Mod: CPTII,,, | Performed by: MIDWIFE

## 2022-04-08 PROCEDURE — 3008F BODY MASS INDEX DOCD: CPT | Mod: CPTII,,, | Performed by: MIDWIFE

## 2022-04-08 PROCEDURE — 99999 PR PBB SHADOW E&M-EST. PATIENT-LVL II: ICD-10-PCS | Mod: PBBFAC,,, | Performed by: MIDWIFE

## 2022-04-08 PROCEDURE — 3080F PR MOST RECENT DIASTOLIC BLOOD PRESSURE >= 90 MM HG: ICD-10-PCS | Mod: CPTII,,, | Performed by: MIDWIFE

## 2022-04-08 PROCEDURE — 1159F PR MEDICATION LIST DOCUMENTED IN MEDICAL RECORD: ICD-10-PCS | Mod: CPTII,,, | Performed by: MIDWIFE

## 2022-04-08 PROCEDURE — 87624 HPV HI-RISK TYP POOLED RSLT: CPT | Performed by: MIDWIFE

## 2022-04-08 PROCEDURE — 1159F MED LIST DOCD IN RCRD: CPT | Mod: CPTII,,, | Performed by: MIDWIFE

## 2022-04-08 PROCEDURE — 99999 PR PBB SHADOW E&M-EST. PATIENT-LVL II: CPT | Mod: PBBFAC,,, | Performed by: MIDWIFE

## 2022-04-08 PROCEDURE — 88175 CYTOPATH C/V AUTO FLUID REDO: CPT | Performed by: MIDWIFE

## 2022-04-08 NOTE — PROGRESS NOTES
Subjective:       Adeel Gordon is a 47 y.o. female here for a annual wellness exam.  No current complaints.  Personal health questionnaire reviewed: yes.     Gynecologic History  Patient's last menstrual period was 03/10/2022.  Contraception: tubal ligation  Last Pap: . Results were: normal  Last mammogram: 2022. Results were: normal    Obstetric History  OB History    Para Term  AB Living   3 3 3     3   SAB IAB Ectopic Multiple Live Births           3      # Outcome Date GA Lbr Brett/2nd Weight Sex Delivery Anes PTL Lv   3 Term      Vag-Spont   JOSE CARLOS   2 Term      Vag-Spont   JOSE CARLOS   1 Term      Vag-Spont   JOSE CARLOS         The following portions of the patient's history were reviewed and updated as appropriate: allergies, current medications, past family history, past medical history, past social history, past surgical history and problem list.    Review of Systems  A comprehensive review of systems was negative.      Objective:      General appearance: alert, appears stated age and cooperative  Breasts: normal appearance, no masses or tenderness  Abdomen: soft, non-tender; bowel sounds normal; no masses,  no organomegaly  Pelvic: cervix normal in appearance, external genitalia normal, no adnexal masses or tenderness, no cervical motion tenderness, rectovaginal septum normal, uterus normal size, shape, and consistency and vagina normal without discharge      Assessment:      Healthy female exam. Pap smear collected today.      Plan:      Education reviewed: self breast exams and routine health maintanence, including timing and frequency of cervical cancer screenings. .  Follow up in: 1  year for annual wellness exam or sooner if needed. .    Colonoscopy recommendations discussed to start around age 50.   Follow up with PCP regarding CHTN and medication management.       Haley TOSCANO

## 2022-04-14 ENCOUNTER — TELEPHONE (OUTPATIENT)
Dept: SMOKING CESSATION | Facility: CLINIC | Age: 47
End: 2022-04-14
Payer: MEDICAID

## 2022-04-14 LAB
FINAL PATHOLOGIC DIAGNOSIS: NORMAL
HPV HR 12 DNA SPEC QL NAA+PROBE: NEGATIVE
HPV16 AG SPEC QL: NEGATIVE
HPV18 DNA SPEC QL NAA+PROBE: NEGATIVE
Lab: NORMAL

## 2022-04-14 NOTE — TELEPHONE ENCOUNTER
2nd attempt, patient called in regards to 12 month f/u for quit 1 with Smoking Cessation.  Voicemail with contact information given.    
right bka

## 2022-05-31 ENCOUNTER — PATIENT MESSAGE (OUTPATIENT)
Dept: PSYCHIATRY | Facility: CLINIC | Age: 47
End: 2022-05-31
Payer: MEDICAID

## 2022-06-07 ENCOUNTER — OFFICE VISIT (OUTPATIENT)
Dept: INTERNAL MEDICINE | Facility: CLINIC | Age: 47
End: 2022-06-07
Payer: MEDICAID

## 2022-06-07 VITALS
HEART RATE: 89 BPM | WEIGHT: 242.31 LBS | HEIGHT: 66 IN | TEMPERATURE: 98 F | SYSTOLIC BLOOD PRESSURE: 120 MMHG | OXYGEN SATURATION: 95 % | DIASTOLIC BLOOD PRESSURE: 80 MMHG | BODY MASS INDEX: 38.94 KG/M2

## 2022-06-07 DIAGNOSIS — F39 MOOD DISORDER: Chronic | ICD-10-CM

## 2022-06-07 DIAGNOSIS — F41.1 GENERALIZED ANXIETY DISORDER WITH PANIC ATTACKS: Chronic | ICD-10-CM

## 2022-06-07 DIAGNOSIS — I10 BENIGN ESSENTIAL HYPERTENSION: Primary | Chronic | ICD-10-CM

## 2022-06-07 DIAGNOSIS — E66.01 CLASS 2 SEVERE OBESITY DUE TO EXCESS CALORIES WITH SERIOUS COMORBIDITY AND BODY MASS INDEX (BMI) OF 39.0 TO 39.9 IN ADULT: Chronic | ICD-10-CM

## 2022-06-07 DIAGNOSIS — F41.0 GENERALIZED ANXIETY DISORDER WITH PANIC ATTACKS: Chronic | ICD-10-CM

## 2022-06-07 PROCEDURE — 3008F PR BODY MASS INDEX (BMI) DOCUMENTED: ICD-10-PCS | Mod: CPTII,,, | Performed by: FAMILY MEDICINE

## 2022-06-07 PROCEDURE — 4010F ACE/ARB THERAPY RXD/TAKEN: CPT | Mod: CPTII,,, | Performed by: FAMILY MEDICINE

## 2022-06-07 PROCEDURE — 4010F PR ACE/ARB THEARPY RXD/TAKEN: ICD-10-PCS | Mod: CPTII,,, | Performed by: FAMILY MEDICINE

## 2022-06-07 PROCEDURE — 99214 OFFICE O/P EST MOD 30 MIN: CPT | Mod: PBBFAC | Performed by: FAMILY MEDICINE

## 2022-06-07 PROCEDURE — 3079F PR MOST RECENT DIASTOLIC BLOOD PRESSURE 80-89 MM HG: ICD-10-PCS | Mod: CPTII,,, | Performed by: FAMILY MEDICINE

## 2022-06-07 PROCEDURE — 99214 OFFICE O/P EST MOD 30 MIN: CPT | Mod: S$PBB,,, | Performed by: FAMILY MEDICINE

## 2022-06-07 PROCEDURE — 3074F PR MOST RECENT SYSTOLIC BLOOD PRESSURE < 130 MM HG: ICD-10-PCS | Mod: CPTII,,, | Performed by: FAMILY MEDICINE

## 2022-06-07 PROCEDURE — 1159F PR MEDICATION LIST DOCUMENTED IN MEDICAL RECORD: ICD-10-PCS | Mod: CPTII,,, | Performed by: FAMILY MEDICINE

## 2022-06-07 PROCEDURE — 1160F RVW MEDS BY RX/DR IN RCRD: CPT | Mod: CPTII,,, | Performed by: FAMILY MEDICINE

## 2022-06-07 PROCEDURE — 99999 PR PBB SHADOW E&M-EST. PATIENT-LVL IV: CPT | Mod: PBBFAC,,, | Performed by: FAMILY MEDICINE

## 2022-06-07 PROCEDURE — 99214 PR OFFICE/OUTPT VISIT, EST, LEVL IV, 30-39 MIN: ICD-10-PCS | Mod: S$PBB,,, | Performed by: FAMILY MEDICINE

## 2022-06-07 PROCEDURE — 99999 PR PBB SHADOW E&M-EST. PATIENT-LVL IV: ICD-10-PCS | Mod: PBBFAC,,, | Performed by: FAMILY MEDICINE

## 2022-06-07 PROCEDURE — 3079F DIAST BP 80-89 MM HG: CPT | Mod: CPTII,,, | Performed by: FAMILY MEDICINE

## 2022-06-07 PROCEDURE — 1160F PR REVIEW ALL MEDS BY PRESCRIBER/CLIN PHARMACIST DOCUMENTED: ICD-10-PCS | Mod: CPTII,,, | Performed by: FAMILY MEDICINE

## 2022-06-07 PROCEDURE — 1159F MED LIST DOCD IN RCRD: CPT | Mod: CPTII,,, | Performed by: FAMILY MEDICINE

## 2022-06-07 PROCEDURE — 3008F BODY MASS INDEX DOCD: CPT | Mod: CPTII,,, | Performed by: FAMILY MEDICINE

## 2022-06-07 PROCEDURE — 3074F SYST BP LT 130 MM HG: CPT | Mod: CPTII,,, | Performed by: FAMILY MEDICINE

## 2022-06-07 RX ORDER — AMLODIPINE AND VALSARTAN 10; 320 MG/1; MG/1
1 TABLET ORAL DAILY
Qty: 90 TABLET | Refills: 2 | Status: SHIPPED | OUTPATIENT
Start: 2022-06-07 | End: 2023-05-14

## 2022-06-07 NOTE — PROGRESS NOTES
Favio worked since February due to anxiety.   Medicine doesnt work for me.    ***    ***    ***

## 2022-06-07 NOTE — PATIENT INSTRUCTIONS
I've listed below some mental health providers whom I believe are accepting new patients with your insurance. You should be able to get the mental health care you need at one of the centers listed below. Please contact them ASAP to schedule your first appointment. Tell them you were referred by your primary care provider.    [facility]  [address], [place] [address]  Phone: [phone]  Web:  [email or web address]    [facility]  [address], [address]  [place] [address]   Phone: [phone]    [facility]  [address] [place] [address]  Phone: [phone]  Web: [email or web address] [email or web address]    [facility]  [address]  [place] [address]  Phone: [phone]  Web: [email or web address]    [facility]  [address] [place] [address]  Phone: [phone]  Web: [email or web address]    [facility]  [address] [place] [address]  Phone: [phone]  Web: [email or web address]    IMPORANT: If you experience an emotional crisis, go to the nearest emergency room or call the crisis intervention center at [phone].

## 2022-06-07 NOTE — ASSESSMENT & PLAN NOTE
"Wt Readings from Last 6 Encounters:   06/07/22 109.9 kg (242 lb 4.6 oz)   04/08/22 106.3 kg (234 lb 5.6 oz)   03/08/22 103.6 kg (228 lb 6.3 oz)   02/17/22 103 kg (227 lb 1.2 oz)   02/08/22 103.9 kg (229 lb 0.9 oz)   01/13/22 103 kg (227 lb)     Estimated body mass index is 39.11 kg/m² as calculated from the following:    Height as of this encounter: 5' 6" (1.676 m).    Weight as of this encounter: 109.9 kg (242 lb 4.6 oz).    "

## 2022-06-07 NOTE — PROGRESS NOTES
OFFICE VISIT 6/7/22 10:00 AM CDT    CHIEF COMPLAINT: Follow-up    DIAGNOSES SPECIFICALLY EVALUATED AND TREATED THIS ENCOUNTER  1. Benign essential hypertension  - amlodipine-valsartan (EXFORGE)  mg per tablet; Take 1 tablet by mouth once daily.  Dispense: 90 tablet; Refill: 2    2. Class 2 severe obesity due to excess calories with serious comorbidity and body mass index (BMI) of 39.0 to 39.9 in adult    3. Generalized anxiety disorder with panic attacks  - sertraline (ZOLOFT) 100 MG tablet; Take 1 tablet (100 mg total) by mouth once daily.  Dispense: 90 tablet; Refill: 3    4. Mood disorder with anxiety     Follow up in about 6 months (around 12/7/2022) for re-evaluate problem(s) discussed today.   Future Appointments   Date Time Provider Department Center   9/8/2022 10:00 AM Dilshad Rogel MD [facility] [place]   12/1/2022  2:00 PM [facility] [place]     Problem List Items Addressed This Visit     Benign essential hypertension - Primary (Chronic)    Current Assessment & Plan     BP Readings from Last 6 Encounters:   06/07/22 120/80   04/08/22 (!) 140/98   03/08/22 124/86   02/17/22 (!) 112/90   02/08/22 (!) 128/98   12/06/21 130/86      Last 5 Patient Entered Readings                Current 30 Day Average:   Recent Readings 2/15/2022 12/7/2021 12/7/2021 12/6/2021    SBP (mmHg) 126 154 159 151    DBP (mmHg) 82 108 111 110    Pulse 74 78 80 77                  Lab Results   Component Value Date    ESTGFRAFRICA >60.0 12/06/2021    EGFRNONAA >60.0 12/06/2021    CREATININE 0.7 12/06/2021    BUN 11 12/06/2021    K 4.3 12/06/2021     12/06/2021     12/06/2021     Results for orders placed or performed during the hospital encounter of 02/17/22   EKG 12-lead    Collection Time: 02/17/22  9:31 AM    Narrative    Test Reason : Z76.89    Vent. Rate : 078 BPM     Atrial Rate : 078 BPM     P-R Int : 152 ms          QRS Dur : 088 ms      QT Int : 368 ms       P-R-T Axes : 052 020  "-08 degrees     QTc Int : 419 ms    Normal sinus rhythm  Inferior infarct ,age undetermined  Cannot rule out Anterior infarct ,age undetermined  Abnormal ECG  When compared with ECG of 10-FEB-2017 15:31,  Inferior infarct is now Present  T wave inversion now evident in Inferior leads  Confirmed by VERNA HINOJOSA, KARINA ([id number]) on 2/17/2022 11:19:01 PM    Referred By: OLGA SELBY           Confirmed By:KARINA GILLESPIE MD                Relevant Medications    amlodipine-valsartan (EXFORGE)  mg per tablet    Class 2 severe obesity due to excess calories with serious comorbidity and body mass index (BMI) of 39.0 to 39.9 in adult (Chronic)    Current Assessment & Plan     Wt Readings from Last 6 Encounters:   06/07/22 109.9 kg (242 lb 4.6 oz)   04/08/22 106.3 kg (234 lb 5.6 oz)   03/08/22 103.6 kg (228 lb 6.3 oz)   02/17/22 103 kg (227 lb 1.2 oz)   02/08/22 103.9 kg (229 lb 0.9 oz)   01/13/22 103 kg (227 lb)     Estimated body mass index is 39.11 kg/m² as calculated from the following:    Height as of this encounter: 5' 6" (1.676 m).    Weight as of this encounter: 109.9 kg (242 lb 4.6 oz).             Generalized anxiety disorder with panic attacks (Chronic)    Relevant Medications    sertraline (ZOLOFT) 100 MG tablet    Mood disorder with anxiety (Chronic)    Current Assessment & Plan     Hasnt worked since February due to anxiety. Sertraline helping mood, but not anxiety. I recommended mental health counseling. Additional education/instructions were reviewed and shared with Adeel. Refer to After Visit Summary.           Relevant Medications    sertraline (ZOLOFT) 100 MG tablet      Unless noted herein, any chronic conditions are represented as and appear compensated/controlled and stable, and no other significant complaints or concerns were reported.    [facility]  Outpatient Medications Prior to Visit   Medication Sig Dispense Refill    amlodipine-valsartan (EXFORGE)  mg per tablet Take " "1 tablet by mouth once daily. 90 tablet 1    sertraline (ZOLOFT) 100 MG tablet Take 1 tablet (100 mg total) by mouth once daily. 30 tablet 3     No facility-administered medications prior to visit.     Medications Discontinued During This Encounter   Medication Reason    amlodipine-valsartan (EXFORGE)  mg per tablet Reorder    sertraline (ZOLOFT) 100 MG tablet Reorder     Medications Ordered This Encounter   Medications    amlodipine-valsartan (EXFORGE)  mg per tablet     Sig: Take 1 tablet by mouth once daily.     Dispense:  90 tablet     Refill:  2    sertraline (ZOLOFT) 100 MG tablet     Sig: Take 1 tablet (100 mg total) by mouth once daily.     Dispense:  90 tablet     Refill:  3   Review of Systems   Respiratory: Negative for chest tightness and shortness of breath.    Cardiovascular: Negative for chest pain.   Neurological: Negative for seizures and memory loss.   Psychiatric/Behavioral: Positive for dysphoric mood (mild). Negative for agitation, confusion, hallucinations and suicidal ideas. The patient is nervous/anxious.       PHYSICAL EXAM  Vitals:    06/07/22 1029   BP: 120/80   BP Location: Left arm   Patient Position: Sitting   BP Method: Large (Manual)   Pulse: 89   Temp: 97.5 °F (36.4 °C)   TempSrc: Tympanic   SpO2: 95%   Weight: 109.9 kg (242 lb 4.6 oz)   Height: 5' 6" (1.676 m)   CONSTITUTIONAL: Vital signs noted. No apparent distress. Does not appear acutely ill or septic. Appears adequately hydrated.  PULM: Lungs clear. Breathing unlabored.  HEART: Regular.  DERM: Skin warm and moist with normal turgor.  NEURO: There are no gross focal motor deficits.  PSYCHIATRIC: Alert and conversant. Mood grossly neutral. Judgment and insight grossly intact.    Documentation entered by me for this encounter may have been done in part using speech-recognition technology. Although I have made an effort to ensure accuracy, "sound like" errors may exist and should be interpreted in context.   "

## 2022-06-07 NOTE — ASSESSMENT & PLAN NOTE
BP Readings from Last 6 Encounters:   06/07/22 120/80   04/08/22 (!) 140/98   03/08/22 124/86   02/17/22 (!) 112/90   02/08/22 (!) 128/98   12/06/21 130/86      Last 5 Patient Entered Readings                Current 30 Day Average:   Recent Readings 2/15/2022 12/7/2021 12/7/2021 12/6/2021    SBP (mmHg) 126 154 159 151    DBP (mmHg) 82 108 111 110    Pulse 74 78 80 77                  Lab Results   Component Value Date    ESTGFRAFRICA >60.0 12/06/2021    EGFRNONAA >60.0 12/06/2021    CREATININE 0.7 12/06/2021    BUN 11 12/06/2021    K 4.3 12/06/2021     12/06/2021     12/06/2021     Results for orders placed or performed during the hospital encounter of 02/17/22   EKG 12-lead    Collection Time: 02/17/22  9:31 AM    Narrative    Test Reason : Z76.89    Vent. Rate : 078 BPM     Atrial Rate : 078 BPM     P-R Int : 152 ms          QRS Dur : 088 ms      QT Int : 368 ms       P-R-T Axes : 052 020 -08 degrees     QTc Int : 419 ms    Normal sinus rhythm  Inferior infarct ,age undetermined  Cannot rule out Anterior infarct ,age undetermined  Abnormal ECG  When compared with ECG of 10-FEB-2017 15:31,  Inferior infarct is now Present  T wave inversion now evident in Inferior leads  Confirmed by KARINA GILLESPIE MD ([id number]) on 2/17/2022 11:19:01 PM    Referred By: OLGA SELBY           Confirmed By:KARINA GILLESPIE MD

## 2022-06-21 ENCOUNTER — PATIENT MESSAGE (OUTPATIENT)
Dept: INTERNAL MEDICINE | Facility: CLINIC | Age: 47
End: 2022-06-21
Payer: MEDICAID

## 2022-06-21 NOTE — LETTER
2022        TO WHOM IT MAY CONCERN:       RE: Adeel Gordon,  1975    Adeel Gordon is under my care.    I last examined her on 2021, and I am aware of no new developments since then.    When I last examined Adeel, I identified no reason that she could not work in a job for which she was qualified.    Please extend to Adeel all due courtesy and consideration.    Sincerely,     DAISY Leigh MD  Letter written at request of Adeel Gordon and routed to her for her distribution.

## 2022-06-28 NOTE — TELEPHONE ENCOUNTER
"Hi, Adeel.    I sent you the letter you requested. I hope it helps.    You should be able to view and print the document in your [facility] account under "Communications" > "Letters."    If you need help with [facility], visit [email or web address] or call [phone].    If for some reason you are unable to view and print the document, just send a message to my staff, and they can print a copy which we can put in the mail to you.    All the best,    DAISY Leigh MD                 2022        TO WHOM IT MAY CONCERN:       RE: Adeel Evan,  1975    Adeel Gordon is under my care.    I last examined her on 2021, and I am aware of no new developments since then.    When I last examined Adeel, I identified no reason that she could not work in a job for which she was qualified.    Please extend to Adeel all due courtesy and consideration.    Sincerely,     DAISY Leigh MD  Letter written at request of Adeel Gordon and routed to her for her distribution.  "

## 2022-07-02 RX ORDER — SERTRALINE HYDROCHLORIDE 100 MG/1
100 TABLET, FILM COATED ORAL DAILY
Qty: 90 TABLET | Refills: 3 | Status: SHIPPED | OUTPATIENT
Start: 2022-07-02 | End: 2023-01-10

## 2022-07-02 NOTE — ASSESSMENT & PLAN NOTE
Favio worked since February due to anxiety. Sertraline helping mood, but not anxiety. I recommended mental health counseling. Additional education/instructions were reviewed and shared with Adeel. Refer to After Visit Summary.

## 2022-11-30 ENCOUNTER — TELEPHONE (OUTPATIENT)
Dept: INTERNAL MEDICINE | Facility: CLINIC | Age: 47
End: 2022-11-30
Payer: MEDICAID

## 2022-12-14 DIAGNOSIS — I10 BENIGN ESSENTIAL HYPERTENSION: ICD-10-CM

## 2023-01-10 ENCOUNTER — OFFICE VISIT (OUTPATIENT)
Dept: INTERNAL MEDICINE | Facility: CLINIC | Age: 48
End: 2023-01-10
Payer: MEDICAID

## 2023-01-10 VITALS
WEIGHT: 247.56 LBS | BODY MASS INDEX: 39.79 KG/M2 | SYSTOLIC BLOOD PRESSURE: 116 MMHG | OXYGEN SATURATION: 97 % | HEART RATE: 78 BPM | TEMPERATURE: 98 F | HEIGHT: 66 IN | DIASTOLIC BLOOD PRESSURE: 84 MMHG

## 2023-01-10 DIAGNOSIS — E66.01 CLASS 2 SEVERE OBESITY DUE TO EXCESS CALORIES WITH SERIOUS COMORBIDITY AND BODY MASS INDEX (BMI) OF 39.0 TO 39.9 IN ADULT: Chronic | ICD-10-CM

## 2023-01-10 DIAGNOSIS — I10 BENIGN ESSENTIAL HYPERTENSION: Primary | Chronic | ICD-10-CM

## 2023-01-10 DIAGNOSIS — Z12.4 PAP SMEAR FOR CERVICAL CANCER SCREENING: ICD-10-CM

## 2023-01-10 DIAGNOSIS — Z12.31 ENCOUNTER FOR SCREENING MAMMOGRAM FOR MALIGNANT NEOPLASM OF BREAST: ICD-10-CM

## 2023-01-10 PROBLEM — F17.290 NICOTINE DEPENDENCE, OTHER TOBACCO PRODUCT, UNCOMPLICATED: Status: RESOLVED | Noted: 2018-07-13 | Resolved: 2023-01-10

## 2023-01-10 PROCEDURE — 99999 PR PBB SHADOW E&M-EST. PATIENT-LVL IV: ICD-10-PCS | Mod: PBBFAC,,, | Performed by: NURSE PRACTITIONER

## 2023-01-10 PROCEDURE — 3008F BODY MASS INDEX DOCD: CPT | Mod: CPTII,,, | Performed by: NURSE PRACTITIONER

## 2023-01-10 PROCEDURE — 1160F PR REVIEW ALL MEDS BY PRESCRIBER/CLIN PHARMACIST DOCUMENTED: ICD-10-PCS | Mod: CPTII,,, | Performed by: NURSE PRACTITIONER

## 2023-01-10 PROCEDURE — 3074F SYST BP LT 130 MM HG: CPT | Mod: CPTII,,, | Performed by: NURSE PRACTITIONER

## 2023-01-10 PROCEDURE — 99214 OFFICE O/P EST MOD 30 MIN: CPT | Mod: S$PBB,,, | Performed by: NURSE PRACTITIONER

## 2023-01-10 PROCEDURE — 3008F PR BODY MASS INDEX (BMI) DOCUMENTED: ICD-10-PCS | Mod: CPTII,,, | Performed by: NURSE PRACTITIONER

## 2023-01-10 PROCEDURE — 99214 PR OFFICE/OUTPT VISIT, EST, LEVL IV, 30-39 MIN: ICD-10-PCS | Mod: S$PBB,,, | Performed by: NURSE PRACTITIONER

## 2023-01-10 PROCEDURE — 1159F MED LIST DOCD IN RCRD: CPT | Mod: CPTII,,, | Performed by: NURSE PRACTITIONER

## 2023-01-10 PROCEDURE — 99999 PR PBB SHADOW E&M-EST. PATIENT-LVL IV: CPT | Mod: PBBFAC,,, | Performed by: NURSE PRACTITIONER

## 2023-01-10 PROCEDURE — 3079F DIAST BP 80-89 MM HG: CPT | Mod: CPTII,,, | Performed by: NURSE PRACTITIONER

## 2023-01-10 PROCEDURE — 99214 OFFICE O/P EST MOD 30 MIN: CPT | Mod: PBBFAC | Performed by: NURSE PRACTITIONER

## 2023-01-10 PROCEDURE — 1160F RVW MEDS BY RX/DR IN RCRD: CPT | Mod: CPTII,,, | Performed by: NURSE PRACTITIONER

## 2023-01-10 PROCEDURE — 1159F PR MEDICATION LIST DOCUMENTED IN MEDICAL RECORD: ICD-10-PCS | Mod: CPTII,,, | Performed by: NURSE PRACTITIONER

## 2023-01-10 PROCEDURE — 3074F PR MOST RECENT SYSTOLIC BLOOD PRESSURE < 130 MM HG: ICD-10-PCS | Mod: CPTII,,, | Performed by: NURSE PRACTITIONER

## 2023-01-10 PROCEDURE — 3079F PR MOST RECENT DIASTOLIC BLOOD PRESSURE 80-89 MM HG: ICD-10-PCS | Mod: CPTII,,, | Performed by: NURSE PRACTITIONER

## 2023-01-10 NOTE — PROGRESS NOTES
Chief Complaint  Chief Complaint   Patient presents with    Follow-up         HPI     HPI  Adeel Gordon is a 47 y.o. female with medical diagnoses as listed in the medical history and problem list that presents for HTN.  This patient is new to me.    HTN: Reports compliance with Exforge 10-320mg. Blood Pressure is 116/84 in clinic today. All other vitals are within acceptable range. Reports not starting Zoloft prescribed at last visit. Reports increased anxiety at the time Zoloft was prescribed however, she reports coping skills has improved ans she does not need the medication.   Pertinent negatives are chest pain/palpitations/tightness/discomfort, SOB, GI upset, urinary/bowel changes, unexplained weight loss/gain, dizziness/headaches/syncope.        History     PAST MEDICAL HISTORY:  Past Medical History:   Diagnosis Date    Benign essential hypertension 1/30/2014    Class 1 obesity due to excess calories with serious comorbidity and body mass index (BMI) of 34.0 to 34.9 in adult 12/3/2015    Class 2 severe obesity due to excess calories with serious comorbidity and body mass index (BMI) of 38.0 to 38.9 in adult 12/3/2015    Depression     not taking meds     Generalized anxiety disorder 3/28/2022    History of endometrial ablation 07/2017    Hypertension     Iron deficiency anemia due to chronic blood loss 1/6/2017    Obesity     S/P tubal ligation 07/2017    Severe obesity with body mass index (BMI) of 36.0 to 36.9 with serious comorbidity 12/3/2015    Tobacco use disorder        PAST SURGICAL HISTORY:  Past Surgical History:   Procedure Laterality Date    ENDOMETRIAL ABLATION  02/28/2017    TUBAL LIGATION  02/28/2017    VAGINAL DELIVERY      x 3       SOCIAL HISTORY:  Social History     Socioeconomic History    Marital status:     Number of children: 3   Occupational History    Occupation:      Employer: [employer]     Comment: [employer]   Tobacco  "Use    Smoking status: Every Day     Packs/day: 0.20     Years: 20.00     Pack years: 4.00     Types: Vaping with nicotine, Cigarettes    Smokeless tobacco: Never    Tobacco comments:     Vape pen >10 x / day   Substance and Sexual Activity    Alcohol use: No     Alcohol/week: 0.0 standard drinks    Drug use: No    Sexual activity: Yes     Partners: Male     Birth control/protection: None       FAMILY HISTORY:  Family History   Problem Relation Age of Onset    No Known Problems Sister     No Known Problems Sister     No Known Problems Brother     Kidney disease Son     Hypertension Son     Cancer Father         THROAT    Hypertension Father     Hypertension Mother        ALLERGIES AND MEDICATIONS: updated and reviewed.  Review of patient's allergies indicates:  No Known Allergies  Current Outpatient Medications   Medication Sig Dispense Refill    amlodipine-valsartan (EXFORGE)  mg per tablet Take 1 tablet by mouth once daily. 90 tablet 2     No current facility-administered medications for this visit.           Exam     ROS  Review of Systems   Constitutional:  Negative for appetite change, chills, fatigue and fever.   HENT:  Negative for congestion, ear pain, postnasal drip, rhinorrhea, sinus pressure, sneezing and sore throat.    Respiratory:  Negative for shortness of breath.    Cardiovascular:  Negative for chest pain and palpitations.   Gastrointestinal:  Negative for abdominal pain, constipation, diarrhea, nausea and vomiting.   Genitourinary:  Negative for dysuria.   Musculoskeletal:  Negative for arthralgias.   Neurological:  Negative for headaches.   Psychiatric/Behavioral:  Negative for sleep disturbance.          Physical Exam  Vitals:    01/10/23 0902   BP: 116/84   BP Location: Left arm   Patient Position: Sitting   BP Method: Large (Manual)   Pulse: 78   Temp: 97.8 °F (36.6 °C)   SpO2: 97%   Weight: 112.3 kg (247 lb 9.2 oz)   Height: 5' 6" (1.676 m)    Body mass index is 39.96 kg/m².  Weight: " "112.3 kg (247 lb 9.2 oz)   Height: 5' 6" (167.6 cm)   Physical Exam  Constitutional:       General: She is not in acute distress.     Appearance: Normal appearance. She is obese.   HENT:      Head: Normocephalic and atraumatic.      Right Ear: External ear normal.      Left Ear: External ear normal.      Nose: Nose normal.   Eyes:      Pupils: Pupils are equal, round, and reactive to light.   Cardiovascular:      Rate and Rhythm: Normal rate and regular rhythm.      Pulses: Normal pulses.   Pulmonary:      Effort: Pulmonary effort is normal. No respiratory distress.      Breath sounds: Normal breath sounds. No wheezing.   Abdominal:      General: Bowel sounds are normal.      Palpations: Abdomen is soft.   Musculoskeletal:      Cervical back: Neck supple.   Lymphadenopathy:      Cervical: No cervical adenopathy.   Skin:     General: Skin is warm and dry.   Neurological:      Mental Status: She is alert and oriented to person, place, and time.           Health Maintenance         Date Due Completion Date    Pneumococcal Vaccines (Age 0-64) (1 - PCV) Never done ---    Colorectal Cancer Screening Never done ---    COVID-19 Vaccine (2 - Booster for Mee series) 05/08/2021 3/13/2021    Hemoglobin A1c (Diabetic Prevention Screening) 07/13/2021 7/13/2018    Influenza Vaccine (1) 09/01/2022 1/30/2014    Mammogram 01/13/2023 1/13/2022    TETANUS VACCINE 11/10/2026 11/10/2016    Lipid Panel 12/06/2026 12/6/2021    Cervical Cancer Screening 04/08/2027 4/8/2022              Assessment & Plan     Assessment & Plan  Problem List Items Addressed This Visit          Cardiac/Vascular    Benign essential hypertension - Primary (Chronic)  -The current medical regimen is effective;  continue present plan and medications.       Endocrine    Class 2 severe obesity due to excess calories with serious comorbidity and body mass index (BMI) of 39.0 to 39.9 in adult (Chronic)  -Diet and exercise discussed with patient.   -We discussed a " ""heart-healthy" diet with lots of fruits and vegetables, fiber, and healthy fats (like those found in fish and certain oils). Limit sugar, red meats, unhealthy fats and late night snacking.    Relevant Orders    Ambulatory referral/consult to [facility]     Other Visit Diagnoses       Pap smear for cervical cancer screening        Relevant Orders    Ambulatory referral/consult to Obstetrics / Gynecology    Encounter for screening mammogram for malignant neoplasm of breast        Relevant Orders    Mammo Digital Screening [employer]/ [employer]              Health Maintenance reviewed: As ordered above    Follow-up: 1 year recall already scheduled    30+ minutes of total time spent on the encounter, which includes face to face time and non-face to face time preparing to see the patient (eg, review of tests), Obtaining and/or reviewing separately obtained history, documenting clinical information in the electronic or other health record, independently interpreting results (not separately reported) and communicating results to the patient/family/caregiver, or Care coordination (not separately reported).          "

## 2023-01-11 ENCOUNTER — PATIENT MESSAGE (OUTPATIENT)
Dept: PRIMARY CARE CLINIC | Facility: CLINIC | Age: 48
End: 2023-01-11
Payer: MEDICAID

## 2023-02-10 ENCOUNTER — HOSPITAL ENCOUNTER (OUTPATIENT)
Dept: RADIOLOGY | Facility: HOSPITAL | Age: 48
Discharge: HOME | End: 2023-02-10
Attending: NURSE PRACTITIONER
Payer: MEDICAID

## 2023-02-10 VITALS — HEIGHT: 66 IN | WEIGHT: 247.56 LBS | BODY MASS INDEX: 39.79 KG/M2

## 2023-02-10 DIAGNOSIS — Z12.31 ENCOUNTER FOR SCREENING MAMMOGRAM FOR MALIGNANT NEOPLASM OF BREAST: ICD-10-CM

## 2023-02-10 PROCEDURE — 77063 MAMMO DIGITAL SCREENING BILAT WITH TOMO: ICD-10-PCS | Mod: 26,,, | Performed by: RADIOLOGY

## 2023-02-10 PROCEDURE — 77067 MAMMO DIGITAL SCREENING BILAT WITH TOMO: ICD-10-PCS | Mod: 26,,, | Performed by: RADIOLOGY

## 2023-02-10 PROCEDURE — 77063 BREAST TOMOSYNTHESIS BI: CPT | Mod: 26,,, | Performed by: RADIOLOGY

## 2023-02-10 PROCEDURE — 77067 SCR MAMMO BI INCL CAD: CPT | Mod: 26,,, | Performed by: RADIOLOGY

## 2023-02-10 PROCEDURE — 77067 SCR MAMMO BI INCL CAD: CPT | Mod: TC

## 2023-02-20 ENCOUNTER — LAB VISIT (OUTPATIENT)
Dept: LAB | Facility: HOSPITAL | Age: 48
End: 2023-02-20
Attending: PHYSICIAN ASSISTANT
Payer: MEDICAID

## 2023-02-20 ENCOUNTER — OFFICE VISIT (OUTPATIENT)
Dept: PRIMARY CARE CLINIC | Facility: CLINIC | Age: 48
End: 2023-02-20
Payer: MEDICAID

## 2023-02-20 VITALS
BODY MASS INDEX: 39.76 KG/M2 | SYSTOLIC BLOOD PRESSURE: 120 MMHG | WEIGHT: 247.38 LBS | DIASTOLIC BLOOD PRESSURE: 84 MMHG | TEMPERATURE: 98 F | HEIGHT: 66 IN

## 2023-02-20 DIAGNOSIS — I10 BENIGN ESSENTIAL HYPERTENSION: Chronic | ICD-10-CM

## 2023-02-20 DIAGNOSIS — E66.01 MORBID OBESITY WITH BMI OF 40.0-44.9, ADULT: Primary | ICD-10-CM

## 2023-02-20 DIAGNOSIS — E66.01 MORBID OBESITY WITH BMI OF 40.0-44.9, ADULT: ICD-10-CM

## 2023-02-20 LAB
ALBUMIN SERPL BCP-MCNC: 3.9 G/DL (ref 3.5–5.2)
ALP SERPL-CCNC: 71 U/L (ref 55–135)
ALT SERPL W/O P-5'-P-CCNC: 15 U/L (ref 10–44)
ANION GAP SERPL CALC-SCNC: 8 MMOL/L (ref 8–16)
AST SERPL-CCNC: 13 U/L (ref 10–40)
BASOPHILS # BLD AUTO: 0.08 K/UL (ref 0–0.2)
BASOPHILS NFR BLD: 1.1 % (ref 0–1.9)
BILIRUB SERPL-MCNC: 0.4 MG/DL (ref 0.1–1)
BUN SERPL-MCNC: 7 MG/DL (ref 6–20)
CALCIUM SERPL-MCNC: 9.9 MG/DL (ref 8.7–10.5)
CHLORIDE SERPL-SCNC: 107 MMOL/L (ref 95–110)
CHOLEST SERPL-MCNC: 179 MG/DL (ref 120–199)
CHOLEST/HDLC SERPL: 4.5 {RATIO} (ref 2–5)
CO2 SERPL-SCNC: 25 MMOL/L (ref 23–29)
CREAT SERPL-MCNC: 0.8 MG/DL (ref 0.5–1.4)
DIFFERENTIAL METHOD: ABNORMAL
EOSINOPHIL # BLD AUTO: 0.1 K/UL (ref 0–0.5)
EOSINOPHIL NFR BLD: 1.8 % (ref 0–8)
ERYTHROCYTE [DISTWIDTH] IN BLOOD BY AUTOMATED COUNT: 12.8 % (ref 11.5–14.5)
EST. GFR  (NO RACE VARIABLE): >60 ML/MIN/1.73 M^2
ESTIMATED AVG GLUCOSE: 117 MG/DL (ref 68–131)
GLUCOSE SERPL-MCNC: 98 MG/DL (ref 70–110)
HBA1C MFR BLD: 5.7 % (ref 4–5.6)
HCT VFR BLD AUTO: 37.5 % (ref 37–48.5)
HDLC SERPL-MCNC: 40 MG/DL (ref 40–75)
HDLC SERPL: 22.3 % (ref 20–50)
HGB BLD-MCNC: 12.2 G/DL (ref 12–16)
IMM GRANULOCYTES # BLD AUTO: 0.02 K/UL (ref 0–0.04)
IMM GRANULOCYTES NFR BLD AUTO: 0.3 % (ref 0–0.5)
INSULIN COLLECTION INTERVAL: NORMAL
INSULIN SERPL-ACNC: 16.4 UU/ML
LDLC SERPL CALC-MCNC: 111 MG/DL (ref 63–159)
LYMPHOCYTES # BLD AUTO: 2.5 K/UL (ref 1–4.8)
LYMPHOCYTES NFR BLD: 33 % (ref 18–48)
MCH RBC QN AUTO: 35.2 PG (ref 27–31)
MCHC RBC AUTO-ENTMCNC: 32.5 G/DL (ref 32–36)
MCV RBC AUTO: 108 FL (ref 82–98)
MONOCYTES # BLD AUTO: 0.5 K/UL (ref 0.3–1)
MONOCYTES NFR BLD: 5.9 % (ref 4–15)
NEUTROPHILS # BLD AUTO: 4.4 K/UL (ref 1.8–7.7)
NEUTROPHILS NFR BLD: 57.9 % (ref 38–73)
NONHDLC SERPL-MCNC: 139 MG/DL
NRBC BLD-RTO: 0 /100 WBC
PLATELET # BLD AUTO: 383 K/UL (ref 150–450)
PMV BLD AUTO: 9.2 FL (ref 9.2–12.9)
POTASSIUM SERPL-SCNC: 4.2 MMOL/L (ref 3.5–5.1)
PROT SERPL-MCNC: 6.7 G/DL (ref 6–8.4)
RBC # BLD AUTO: 3.47 M/UL (ref 4–5.4)
SODIUM SERPL-SCNC: 140 MMOL/L (ref 136–145)
TRIGL SERPL-MCNC: 140 MG/DL (ref 30–150)
TSH SERPL DL<=0.005 MIU/L-ACNC: 1.07 UIU/ML (ref 0.4–4)
WBC # BLD AUTO: 7.61 K/UL (ref 3.9–12.7)

## 2023-02-20 PROCEDURE — 1159F MED LIST DOCD IN RCRD: CPT | Mod: CPTII,,, | Performed by: PHYSICIAN ASSISTANT

## 2023-02-20 PROCEDURE — 99215 PR OFFICE/OUTPT VISIT, EST, LEVL V, 40-54 MIN: ICD-10-PCS | Mod: S$PBB,,, | Performed by: PHYSICIAN ASSISTANT

## 2023-02-20 PROCEDURE — 99999 PR PBB SHADOW E&M-EST. PATIENT-LVL III: CPT | Mod: PBBFAC,,, | Performed by: PHYSICIAN ASSISTANT

## 2023-02-20 PROCEDURE — 99215 OFFICE O/P EST HI 40 MIN: CPT | Mod: S$PBB,,, | Performed by: PHYSICIAN ASSISTANT

## 2023-02-20 PROCEDURE — 3008F PR BODY MASS INDEX (BMI) DOCUMENTED: ICD-10-PCS | Mod: CPTII,,, | Performed by: PHYSICIAN ASSISTANT

## 2023-02-20 PROCEDURE — 83525 ASSAY OF INSULIN: CPT | Performed by: PHYSICIAN ASSISTANT

## 2023-02-20 PROCEDURE — 80053 COMPREHEN METABOLIC PANEL: CPT | Performed by: PHYSICIAN ASSISTANT

## 2023-02-20 PROCEDURE — 3074F PR MOST RECENT SYSTOLIC BLOOD PRESSURE < 130 MM HG: ICD-10-PCS | Mod: CPTII,,, | Performed by: PHYSICIAN ASSISTANT

## 2023-02-20 PROCEDURE — 4010F PR ACE/ARB THEARPY RXD/TAKEN: ICD-10-PCS | Mod: CPTII,,, | Performed by: PHYSICIAN ASSISTANT

## 2023-02-20 PROCEDURE — 85025 COMPLETE CBC W/AUTO DIFF WBC: CPT | Performed by: PHYSICIAN ASSISTANT

## 2023-02-20 PROCEDURE — 80061 LIPID PANEL: CPT | Performed by: PHYSICIAN ASSISTANT

## 2023-02-20 PROCEDURE — 1159F PR MEDICATION LIST DOCUMENTED IN MEDICAL RECORD: ICD-10-PCS | Mod: CPTII,,, | Performed by: PHYSICIAN ASSISTANT

## 2023-02-20 PROCEDURE — 3079F PR MOST RECENT DIASTOLIC BLOOD PRESSURE 80-89 MM HG: ICD-10-PCS | Mod: CPTII,,, | Performed by: PHYSICIAN ASSISTANT

## 2023-02-20 PROCEDURE — 99213 OFFICE O/P EST LOW 20 MIN: CPT | Mod: PBBFAC | Performed by: PHYSICIAN ASSISTANT

## 2023-02-20 PROCEDURE — 36415 COLL VENOUS BLD VENIPUNCTURE: CPT | Performed by: PHYSICIAN ASSISTANT

## 2023-02-20 PROCEDURE — 3079F DIAST BP 80-89 MM HG: CPT | Mod: CPTII,,, | Performed by: PHYSICIAN ASSISTANT

## 2023-02-20 PROCEDURE — 4010F ACE/ARB THERAPY RXD/TAKEN: CPT | Mod: CPTII,,, | Performed by: PHYSICIAN ASSISTANT

## 2023-02-20 PROCEDURE — 84443 ASSAY THYROID STIM HORMONE: CPT | Performed by: PHYSICIAN ASSISTANT

## 2023-02-20 PROCEDURE — 3074F SYST BP LT 130 MM HG: CPT | Mod: CPTII,,, | Performed by: PHYSICIAN ASSISTANT

## 2023-02-20 PROCEDURE — 83036 HEMOGLOBIN GLYCOSYLATED A1C: CPT | Performed by: PHYSICIAN ASSISTANT

## 2023-02-20 PROCEDURE — 3008F BODY MASS INDEX DOCD: CPT | Mod: CPTII,,, | Performed by: PHYSICIAN ASSISTANT

## 2023-02-20 PROCEDURE — 99999 PR PBB SHADOW E&M-EST. PATIENT-LVL III: ICD-10-PCS | Mod: PBBFAC,,, | Performed by: PHYSICIAN ASSISTANT

## 2023-02-20 NOTE — PROGRESS NOTES
Subjective:       Patient ID: Adeel Gordon is a 47 y.o. female.    HPI    Lifestyle related medical issues:       Weight   Hypertension     Past Medical History:   Diagnosis Date    Benign essential hypertension 1/30/2014    Class 1 obesity due to excess calories with serious comorbidity and body mass index (BMI) of 34.0 to 34.9 in adult 12/3/2015    Class 2 severe obesity due to excess calories with serious comorbidity and body mass index (BMI) of 38.0 to 38.9 in adult 12/3/2015    Depression     not taking meds     Generalized anxiety disorder 3/28/2022    History of endometrial ablation 07/2017    Hypertension     Iron deficiency anemia due to chronic blood loss 1/6/2017    Obesity     S/P tubal ligation 07/2017    Severe obesity with body mass index (BMI) of 36.0 to 36.9 with serious comorbidity 12/3/2015    Tobacco use disorder      Social Determinants of Health with Concerns     Tobacco Use: High Risk    Smoking Tobacco Use: Every Day    Smokeless Tobacco Use: Never    Passive Exposure: Not on file   Alcohol Use: Not on file   Financial Resource Strain: Not on file   Food Insecurity: Not on file   Transportation Needs: Not on file   Physical Activity: Not on file   Stress: Not on file   Social Connections: Not on file   Housing Stability: Not on file     Past Surgical History:   Procedure Laterality Date    ENDOMETRIAL ABLATION  02/28/2017    TUBAL LIGATION  02/28/2017    VAGINAL DELIVERY      x 3     Family History   Problem Relation Age of Onset    No Known Problems Sister     No Known Problems Sister     No Known Problems Brother     Kidney disease Son     Hypertension Son     Cancer Father         THROAT    Hypertension Father     Hypertension Mother          Physical activity:  none   Diet: yesterday recall   [employer]- lunch   Donuts   [employer] for dinner     Sleep: no issues   Stress:  somewhat elevated   Overall wellbeing:  good   Social support: ok     Weight goal -230    Wt Readings from Last  "3 Encounters:   02/20/23 112.2 kg (247 lb 5.7 oz)   02/10/23 112.3 kg (247 lb 9.2 oz)   01/10/23 112.3 kg (247 lb 9.2 oz)       Current stage of change  precontemplation   Next stage of change contemplation       Current Outpatient Medications   Medication Instructions    amlodipine-valsartan (EXFORGE)  mg per tablet 1 tablet, Oral, Daily            Review of Systems   Constitutional:  Negative for fatigue, fever and unexpected weight change.   HENT:  Negative for sore throat and trouble swallowing.    Respiratory:  Negative for cough and shortness of breath.    Cardiovascular:  Negative for chest pain.   Gastrointestinal:  Negative for abdominal pain.   Hematological:  Negative for adenopathy. Does not bruise/bleed easily.   All other systems reviewed and are negative.    Objective:   /84 (BP Location: Right arm, Patient Position: Sitting, BP Method: Large (Manual))   Temp 98.2 °F (36.8 °C) (Oral)   Ht 5' 6" (1.676 m)   Wt 112.2 kg (247 lb 5.7 oz)   LMP 02/09/2023   BMI 39.92 kg/m²      Physical Exam  Constitutional:       Appearance: Normal appearance. She is obese.   HENT:      Head: Normocephalic and atraumatic.   Pulmonary:      Effort: Pulmonary effort is normal.   Neurological:      General: No focal deficit present.      Mental Status: She is alert and oriented to person, place, and time.         Lab Results   Component Value Date    WBC 7.43 10/22/2020    HGB 12.1 10/22/2020    HCT 37.5 10/22/2020     10/22/2020    CHOL 190 12/06/2021    TRIG 109 12/06/2021    HDL 37 (L) 12/06/2021    ALT 12 12/06/2021    AST 13 12/06/2021     12/06/2021    K 4.3 12/06/2021     12/06/2021    CREATININE 0.7 12/06/2021    BUN 11 12/06/2021    CO2 25 12/06/2021    TSH 0.910 09/13/2019    HGBA1C 5.4 07/13/2018       Assessment:       1. Morbid obesity with BMI of 40.0-44.9, adult    2. Benign essential hypertension        Plan:   Morbid obesity with BMI of 40.0-44.9, adult  -     Insulin, " "Random; Future; Expected date: 02/20/2023  -     Hemoglobin A1C; Future; Expected date: 02/20/2023  -     Lipid Panel; Future; Expected date: 02/20/2023  -     TSH; Future; Expected date: 02/20/2023  -     CBC Auto Differential; Future; Expected date: 02/20/2023  -     Comprehensive Metabolic Panel; Future; Expected date: 02/20/2023    Benign essential hypertension        Reviewed foods that are going to be high in nutriets, lower in calories     Nutrition (diet) -   Fill your days with nutritious food that your body will thank you for.  Research shows that the healthiest diet in the world is the Mediterranean diet.  While not a diet, this guide to nutritious food to help you live your healthiest life from the inside, out.   The major theme of this "diet" is eating foods that are whole.  Fruits, vegetables, whole grains, lean meats, seafood and low fat dairy.    Websites such as Eatingwell.com have great articles, free recipes, etc. to help you on your way.      Physical Activity-  Since humans have walked this earth, we have always been physically active.  Even as recent has 100 years ago, the average American was 5 times more physically active compared to today, and that was just in their daily living.  Not including "formal" exercise- such as the gym or sports. More than HALF of American's are officially couch potatoes.   Taking the stairs, walking around your neighborhood, are ways to increase your activity.  Your body is meant to move.  Aim for 2 hours a week of exercise at a minimum.       Sleep-  Adults who aren't sleeping enough or getting poor quality sleep after weight loss, appear less successful at keeping the weight off than those who are getting adequate, restful sleep.  Prioritize quality sleep to take your weight loss journey even further.      Stress -  Stress is a well-known contributor to illness.  The stress hormone cortisol can drive up weight.  Cortisol also increases sugar cravings.  Many of " us stress eat, which also leads to unhealthy habits.  Not having peace of mind, can do more than keep us up at night, it can keep our weight on.      Social Support - Your diet is more than what you eat. It is also who you spend time with.  Those in your life that promote happiness, laughter, and love can do more than just add a smile on your face.  Longevity is linked to strong social systems.      Substance Use - Tobacco, alcohol, and drug use are all linked to shorter lifespan with added health issues.  Talk to your provider if you need to discuss any of these issues further.      Orders Placed This Encounter   Procedures    Insulin, Random    Hemoglobin A1C    Lipid Panel    TSH    CBC Auto Differential    Comprehensive Metabolic Panel     Visit date not found    Time spent: 60 minutes in face to face and with review prior and after of chart notes from specialists, in regards to diagnosis, prognosis, review of lab and test results, benefits of treatment as well as management of disease, counseling of patient and coordination of care between various health care providers .

## 2023-02-20 NOTE — PATIENT INSTRUCTIONS
"Take a walk!  Steps     Nutrition (diet) -   Fill your days with nutritious food that your body will thank you for.  Research shows that the healthiest diet in the world is the Mediterranean diet.  While not a diet, this guide to nutritious food to help you live your healthiest life from the inside, out.   The major theme of this "diet" is eating foods that are whole.  Fruits, vegetables, whole grains, lean meats, seafood and low fat dairy.    Websites such as Eatingwell.com have great articles, free recipes, etc. to help you on your way.      Physical Activity-  Since humans have walked this earth, we have always been physically active.  Even as recent has 100 years ago, the average American was 5 times more physically active compared to today, and that was just in their daily living.  Not including "formal" exercise- such as the gym or sports. More than HALF of American's are officially couch potatoes.   Taking the stairs, walking around your neighbor, are ways to increase your activity.  Your body is meant to move.  Aim for 2 hours a week of exercise at a minimum.       Sleep-  Adults who aren't sleeping enough or getting poor quality sleep after weight loss, appear less successful at keeping the weight off than those who are getting adequate, restful sleep.  Prioritize quality sleep to take your weight loss journey even further.      Stress -  Stress is a well-known contributor to illness.  The stress hormone cortisol can drive up weight.  Cortisol also increases sugar cravings.  Many of us stress eat, which also leads to unhealthy habits.  Not having peace of mind, can do more than keep us up at night, it can keep our weight on.      Social Support - Your diet is more than what you eat. It is also who you spend time with.  Those in your life that promote happiness, laughter, and love can do more than just add a smile on your face.  Longevity is linked to strong social systems.      Substance Use - Tobacco, " "alcohol, and drug use are all linked to shorter lifespan with added health issues.  Talk to your provider if you need to discuss any of these issues further.            PRODUCE  [] All fresh fruit   [] All fresh vegetables   [] All fresh herbs  [] All herb purees + pastes  [] Pre-spiralized vegetable noodles   [] Steam-In-The-Bag begetables  [] Riced cauliflower  [] Jicama sticks  [] Love Beets  all varieties  [] Wholly Guacamole  all varieties  [] Hummus  all varieties, chickpea + vegetable  [] Tofu Shirataki noodles    [] Tofu  all varieties  [] Tempeh  all varieties    PROTEIN  CHICKEN   [] Boneless, skinless breasts  [] Boneless, skinless thighs  [] Ground chicken breast, at least 93% lean  [] Chicken breast cutlet  [] Aidell's  Chicken Sausage + Chicken Meatballs    TURKEY   [] Turkey breast tenderloin   [] Ground turkey breast, at least 93% lean  [] Silke Naturals  Turkey Sausage    BEEF  [] Tenderloin  [] Sirloin  [] Top Loin  [] Flank Steak  [] Round Steak  [] Filet  [] Lean ground beef, at least 93% lean + grass-fed preferable    PORK  [] Tenderloin  [] Pork Chop  [] Center Cut  [] Silke Naturals  No-Sugar [place]    BISON  [] [place]  90 - 95% lean    SEAFOOD  [] All fresh fish + seafood; locally sourced when possible  [] Smoked salmon    HEAT + EAT ENTREES   [] Swapnil's Natural Foods  Chicken, Pork, Beef  [] Alexandru  "All Natural" Grilled Chicken Breast + Strips, all varieties    SAUCES SPREADS + DIPS  [] Bitchin Sauce  Original, Chipotle, Cilantro [place]  [] Jeanmarie's Kitchen  Tzatziki Yogurt Dip, Babaganoush, Hummus  [] Wholly Guacamole  all varieties  [] Hummus  all varieties  [] [place] Gringo Salsa  all varieties  [] Mrs. Denis's Salsa  all varieties  [] Stubb's All Natural BBQ Sauce  [] Primal Kitchen  Lopez, Ketchup, BBQ Sauce  [] Primal Kitchen Pasta Sauce  Roasted Garlic, Tomato Basil, no-dairy Vodka Sauce  [] Sal & Fatoumata's  HeartSmart Pasta Sauce    DAIRY/DAIRY " SUBSTITUTES/EGGS  EGGS   [] All eggs  cage-free, pasture-raise preferable  [] Crepini  egg wraps  [] Vital Farms  Pasture-Raised Egg Bites  [] JUST Egg [vegan]     CREAMERS   [] Califia  Better Half, original + vanilla unsweetened  [] NutPods  all varieties    MILK   [] Horizon Organic  all varieties except chocolate  [] Organic Valley  all varieties except chocolate  [] Organic Valley  ultra-filtered, reduced fat milk     PLANT_BASED MILK ALTERNATIVES  [] All unsweetened almond milks  original, vanilla + chocolate  [] Ripple  unsweetened   [] Milkadamia  original +_ vanilla, unsweetened   [] Forager  original + vanilla, unsweetened   [] Silk Organic  soy milk, unsweetened  [] Oatly  unsweetened  [] Califia  regular + protein-fortified oat milk, unsweetened     CHEESES  [] Regular or reduced fat cheeses  [] BelGioso  Fresh Mozzarella Snack Packs, Parmesan Power-full Snack   [] Goat cheese  [] Fresh mozzarella  [] String cheese  all varieties  [] Yajaira Cottage Cheese  [] Mara's Cultured Cottage Cheese  [] Neris Life 'Just Like Mozzarella'  plant-based shreds and other varieties  [] Parmela Creamery  plant-based shredded cheese    YOGURT  [] Fage  2% low-fat, plain  [] Siggi's  plain, vanilla  [] Chobani Greek  nonfat + whole milk yogurt, plain   [] Chobani Less Sugar  all flavored varieties   [] Oikos Greek  nonfat, plain  [] Two Good  all varieties   [] Luxembourger Provisions  plain  [] [place] Organic  low-fat + nonfat, plain  [] [facility] Farm  goat milk yogurt, plain  [] Kefit  unsweetened, plain  [] Forager  Greek style unsweetened, plain [dairy-free]  [] [place]  unsweetened Greek style, plain [dairy-free]  [] [facility]  almond milk yogurt, vanilla or plain, unsweetened [dairy-free]    FREEZER SECTION  FROZEN VEGGIES  [] All plain frozen veggies + greens [e.g. broccoli, brussels, carrots, okra, mushrooms, zucchini, yellow squash, butternut squash, kale, spinach, renetta  greens]  [] Riced veggies [e.g. cauliflower, broccoli, butternut squash]  [] Edamame  all varieties  [] Green Giant  [] Veggie Spirals  [] Marinated Veggies [e.g. eggplant, peppers, zucchini]  [] Simply Steam [place] Sprouts  [] Birds Eye  [] Power Blend Italian Style  lentils, broccoli, kale, zucchini  []  [place] Sprouts & Carrots  [] Oven Roasters Broccoli & Cauliflower  [] [place] Blend  [] Tattooed   [] Green Bean Blend  [] Farmer's Market Ratatouille  [] Butter Balsamic Glazed Vegetables  [] Riced Cauliflower & Quinoa Mediterranean Style  [] Anuradha's Good Life  Southern Style Greens [sauteed kale + onion]    FROZEN FRUITS  [] All unsweetened frozen fruits  all varieties  [] Dole Fruits & Veggie Blends  Berries 'n Kale  [] Dole Mix-ins  Triple Berry     FROZEN ENTREES  [] The Good Kitchen meals  all varieties [ e.g. Chili Lime Chicken Over Riced Cauliflower]  [] Premium Paleo  Not Grady Momma's Meatloaf  [] Primal Kitchen  Chicken Pesto + Steak Fajitas w/ Peppers & Onions  [] Eating Well Frozen Entrees  Butter Chicken Masala, Steak Carne Asada, Creamy Pesto Chicken, Chicken + Wild Rice Stroganoff, Yellow Call Chicken, Sun-dried Tomato Chicken, Chicken Lo Mein  [] Realgood Entree Bowls  Sol[place] Islander Inspired Beef Bowl over Riced Cauliflower, Chicken Burrito Bowl   [] Great Karma Coconut Call  [] Su's  [place]ale Eron with Black Beans, Vegetable Lasagna  [] Kashi Mayan [place] Bake  [] Healthy Choice  Simply Steamers Chicken Fried Rice  [] Basil Pesto Chicken & [place] Islander Style Pork Power Bowls  [] Tattooed   Enchilada Bowl  [] Devora Farms  Spicy Black Bean Burgers    FROZEN PIZZAS  [] Cauli'flour Foods  Cauliflower Pizza Crusts  [] Outer Aisle  Cauliflower Crust  [] Su's  Veggie Crust Cheese Pizza  [] Quest Pizza     VEGETARIAN PRODUCTS  [] Beyond Meat  ground 'meat' + grilled 'chicken' strips  [] Tofurkey  Original Italian Sausage + Original Tempeh  [] Gardein  Beefless  Ground + Meatless Meatballs  [] Devora Farms Grillers  Original Burger, Crumbles, Meatballs    ICE CREAMS + FROZEN DESSERTS  [] Halo Top  regular + keto series, pops  [] Rebel  ice cream + dessert sandwiches  [] Enlightened  ice cream + bars  [] Nightfood  ice cream  [] Realgood  ice cream  [] Arctic Zero Fit  frozen pint  [] The Frozen Farmer  sorbets  [] Wholly Rollies  Protein Balls, all varieties  [] Dream Pops  Coconut Latte    FROZEN BREAKFASTS  [] Realgood  Breakfast Sandwiches on Cauliflower Cheesy Bread  [] Rebel  ice cream + dessert sandwiches  [] Enlightened  ice cream + bars  [] Nightfood  ice cream  [] Realgood  ice cream  [] Arctic Zero Fit  frozen pint  [] The Frozen Farmer  sorbets  [] Wholly Rollies  Protein Balls, all varieties  [] Dream Pops  Coconut Latte    BREADS/BUNS/WRAPS  [] Alban Bread: All Types - In Freezer Section   [] Flat Out Light Wraps - All Varieties   [] Flat Out Protein Up Carb Down Flat Bread   [] Kontos Whole Wheat Pocket Kacie   [] Eddi JASBIR 100% Whole Wheat Tortillas   [] LaTortilla Factory Tortillas - Smart & Delicious; 50 or 80-calorie   [] Nature's Own 100% Whole Wheat Bread   [] Orowheat Healthful - 100% Whole Wheat Slice Bread and Blevins Thins   [] Orowheat Healthful - Whole Wheat Nuts & Grain Bread; Flax & Seed Bread   [] Pepperidge Farm Natural Whole Grain 15 Bread   [] Pepper[place] Farm Natural Whole Grain English Muffin - 100% Whole Wheat   [] Mark [place] Farm Very Thin 100% Whole Wheat   [] Jie Combs 45 Calories and Delightful   [] Gary' 100% Whole Wheat Thin-Sliced Bagels and English Muffins   [] Western Bagel: Perfect 10     GLUTEN FREE  [] Milad's Gluten Free Bread   [] Fawnskin Bakehouse 7-Grain Gluten Free Bread     LEGUME PASTA   [] Explore Asian Organic Black Bean Spaghetti   [] Modern Table   [] Tolerant Foods       NUT BUTTERS & JELLIES    NUT BUTTERS   [] Better'n Chocolate: Coconut Chocolate Peanut Butter Spread   [] Better'n Peanut  Butter - All Types   [] Earth Balance Coconut and Peanut Spread   [] Deepak's Nut [place]   [] MaraNatha: All Natural Roasted Cashew Butter - Tiff or Creamy   [] MaraNatha: Roasted Peanut Butter   [] Nuts 'N More Peanut [place] - All Flavors   [] PB2 Powder - Original or Chocolate   [] Skippy Natural - Creamy, Super Chunk   [] Smart Balance Peanut Butter - Tiff or Creamy   [] Peanut Butter & Company:   [] Smooth , Crunch Time, The Heat Is On, Old Fashioned Smooth, Mighty Nut- Powdered Peanut Butter, Squeeze Pack   [] Smucker's Natural Peanut Butter - Tiff or Creamy   [] Sunbutter Nut Butter   [] Wild Friends Protein Peanut Butter/[place] o Butter - Vanilla or Chocolate     JELLIES  o Polaner's All Fruit   o Clearly Organic Best Choice: Strawberry Fruit Spread       SNACKS    BARS  [] Kashi Bars - Chewy or Crunchy; Honey [place] o Flax or Peanut Butter   [] KIND Bars - 5 Grams of Sugar or Less   [] KIND Protein Bars - Strong and KIND   [] Nature Valley Protein Bar - All Varieties   [] Nature Valley Roasted Nut Crunch - [place] Crunch; Peanut Crunch   [] [facility] Simple Nut Bar - Roasted Peanut & Honey   [] [facility] Simple Nut Bar - [place], Cashew & Sea Salt   [] [facility] Nut [place] Bar - Salted Caramel Peanut   [] Think Thin Protein Bars   [] Quest Bars, Power Crunch Bars, Pure Protein Bars     BEEF JERKY - NITRATE FREE   [] Game On   [] Grass Run Farms   [] Krave   [] Ostrim   [] Perky Jerky   [] Primal Strips Meatless Vegan Jerky   [] [place]     CHIPS   [] Beanitos Chips   [] Fruit Crisps - e.g. Brother's-All-Natural, Bare Fruit, Yoga Chips   [] Lindy's Soy Crisps: 1.3 ounce bag   [] Quest Protein Chips   [] Wasa Whole Wheat Crisp Bread     CRACKERS  [] Sumaya's Gone Crackers   [] Nabisco Triscuit: Regular and Thin Crisp Crackers   [] Vans Say Cheese Crackers (G-F)     POPCORN/NUTS   [] Matthias Gaona's Smart Pop Popcorn - Single Serving   [] 100-Calorie Pack of Nuts - All Varieties      PROTEIN POWDERS & DRINKS  []  Protein -  Whey Protein Powder   [] Garden of Life Raw Protein Powder   [] Iconic Ready-To-Drink Protein Drink   [] Hayden One Protein Powder   [] VegaSport Protein Powder     SALSA/HUMMUS/DIPS   [] Eat Well Embrace Life: Zesty Sriracha Carrot o Hummus   [] Pre-Portioned Guacamole Packs   [] Tiffany's   [] Tostitos Restaurant Style Salsa       SOUPS   [] Su's Organic Soups - Lentil, Vegetable, Split Pea, Low-Sodium     CANNED GOODS   [] 100% Pure Pumpkin   [] BlueRunner Creole Cream-Style Red Beans or Navy Beans   [] Cajun Power Chicken Gumbo Base   [] Chicken of the Sea [employer] Fruitland   [] Poppy Fresh Cut Sliced Beets   [] Hormel Breast of Chicken in Water   [] LeSuer Tender Baby Whole Carrots   [] McIldhruv Tabasco Delta Starter   [] Brayanist: Chunk Lite Tuna in Water, Gourmet Select Pouches   [] StarKist: Yellowfin Tuna Fillets   [] Trappey's: Kidney, Butter, Workman, Black Eye, Field, and Black Beans   [] CHIVO Aviles's Turnip Greens or [place] Spinach     CONDIMENTS/ SAUCES/SPREADS/ SPICES  [] Juan F Cervantes's Magic Seasonings - Regular or Salt Free   [] Scott Reyes's Sauces - All Flavors   [] Laughing Cow Light - All Flavors   [] Dash Salt-Free Marinade - All Flavors   [] Yang & Fatoumata's: Heart Smart Pasta Sauces   [] Tabasco     SALAD DRESSINGS  [] Amanda's Naturals: Lite Honey Mustard   [] Hernandez's Own: Lighten Up Salad Dressing - All Varieties   [] OPA Greek Yogurt Dressings - Ranch, Blue o Cheese, Caesar, Feta Dill     SWEETENERS  [] Sweet [place] Sweetener   [] Swerve   [] Truvia     BEVERAGES  [] Coconut Water   [] Crystal Light PURE - All Flavors   [] Honest Tea: Just Green Tea, Unsweetened   [] Kombucha Tea   [] [place]   [] [place] Sisters Bloody Sumaya Mix   [] Metromint - Zero-Sugar; All Natural Flavored   [] Pancho - Plain or Flavored   [] Kenya Meredith   [] Steaz - Zero-Sugar, All-Natural, Sparkling Tea   [] Tea Bags: Any Brand - e.g. Miri, Yogi, Tazzo,  Celestial   [] V8 100% Vegetable Juice   [] Vitamin Water Zero   [] Water   [] Zevia - Stevia Sweetened Soft Drink     BEER/CHASE/LIQUORS  []Hernandez's Premier Light 64 Calories   [] Bud Select - 55 Calories   [] LouisChristianaCare Sisters Bloody Sumaya Mix   [] Mercado Genuine Draft - 64 Calories   [] Red or White Wine - All Varieties     CEREALS: HOT/COLD   [] Myra'tracey Moctezuma's Original Cereal  [] Dk's Mill Oat Bran Hot Cereal - Cracked Wheat, Multi-Grain  [] Kashi GoLean Cereal  [] Kashi GoLean Hot Cereal packets - Vanilla; Honey Cinnamon  [] Valdo's Special K Protein Cereal  [] Lucia's Steel Cut Sudanese Oatmeal  [] Nature's Path Smart Bran  [] Taoist Instant Oatmeal packet, Original  [] Taoist Old Fashioned Taoist Oats  [] Uncle Rajendra's Whole Wheat & Flaxseed Original Cereal

## 2023-03-13 ENCOUNTER — OFFICE VISIT (OUTPATIENT)
Dept: PRIMARY CARE CLINIC | Facility: CLINIC | Age: 48
End: 2023-03-13
Payer: MEDICAID

## 2023-03-13 VITALS
OXYGEN SATURATION: 98 % | SYSTOLIC BLOOD PRESSURE: 118 MMHG | BODY MASS INDEX: 40.15 KG/M2 | WEIGHT: 249.81 LBS | HEART RATE: 84 BPM | HEIGHT: 66 IN | TEMPERATURE: 97 F | DIASTOLIC BLOOD PRESSURE: 82 MMHG

## 2023-03-13 DIAGNOSIS — R73.03 PREDIABETES: ICD-10-CM

## 2023-03-13 DIAGNOSIS — I10 BENIGN ESSENTIAL HYPERTENSION: Primary | Chronic | ICD-10-CM

## 2023-03-13 DIAGNOSIS — E66.01 MORBID OBESITY WITH BMI OF 40.0-44.9, ADULT: ICD-10-CM

## 2023-03-13 PROCEDURE — 3008F PR BODY MASS INDEX (BMI) DOCUMENTED: ICD-10-PCS | Mod: CPTII,,, | Performed by: PHYSICIAN ASSISTANT

## 2023-03-13 PROCEDURE — 99213 OFFICE O/P EST LOW 20 MIN: CPT | Mod: PBBFAC | Performed by: PHYSICIAN ASSISTANT

## 2023-03-13 PROCEDURE — 1159F MED LIST DOCD IN RCRD: CPT | Mod: CPTII,,, | Performed by: PHYSICIAN ASSISTANT

## 2023-03-13 PROCEDURE — 99214 PR OFFICE/OUTPT VISIT, EST, LEVL IV, 30-39 MIN: ICD-10-PCS | Mod: S$PBB,,, | Performed by: PHYSICIAN ASSISTANT

## 2023-03-13 PROCEDURE — 3079F PR MOST RECENT DIASTOLIC BLOOD PRESSURE 80-89 MM HG: ICD-10-PCS | Mod: CPTII,,, | Performed by: PHYSICIAN ASSISTANT

## 2023-03-13 PROCEDURE — 4010F ACE/ARB THERAPY RXD/TAKEN: CPT | Mod: CPTII,,, | Performed by: PHYSICIAN ASSISTANT

## 2023-03-13 PROCEDURE — 1159F PR MEDICATION LIST DOCUMENTED IN MEDICAL RECORD: ICD-10-PCS | Mod: CPTII,,, | Performed by: PHYSICIAN ASSISTANT

## 2023-03-13 PROCEDURE — 99214 OFFICE O/P EST MOD 30 MIN: CPT | Mod: S$PBB,,, | Performed by: PHYSICIAN ASSISTANT

## 2023-03-13 PROCEDURE — 3044F HG A1C LEVEL LT 7.0%: CPT | Mod: CPTII,,, | Performed by: PHYSICIAN ASSISTANT

## 2023-03-13 PROCEDURE — 3079F DIAST BP 80-89 MM HG: CPT | Mod: CPTII,,, | Performed by: PHYSICIAN ASSISTANT

## 2023-03-13 PROCEDURE — 4010F PR ACE/ARB THEARPY RXD/TAKEN: ICD-10-PCS | Mod: CPTII,,, | Performed by: PHYSICIAN ASSISTANT

## 2023-03-13 PROCEDURE — 99999 PR PBB SHADOW E&M-EST. PATIENT-LVL III: ICD-10-PCS | Mod: PBBFAC,,, | Performed by: PHYSICIAN ASSISTANT

## 2023-03-13 PROCEDURE — 3074F PR MOST RECENT SYSTOLIC BLOOD PRESSURE < 130 MM HG: ICD-10-PCS | Mod: CPTII,,, | Performed by: PHYSICIAN ASSISTANT

## 2023-03-13 PROCEDURE — 99999 PR PBB SHADOW E&M-EST. PATIENT-LVL III: CPT | Mod: PBBFAC,,, | Performed by: PHYSICIAN ASSISTANT

## 2023-03-13 PROCEDURE — 3074F SYST BP LT 130 MM HG: CPT | Mod: CPTII,,, | Performed by: PHYSICIAN ASSISTANT

## 2023-03-13 PROCEDURE — 3008F BODY MASS INDEX DOCD: CPT | Mod: CPTII,,, | Performed by: PHYSICIAN ASSISTANT

## 2023-03-13 PROCEDURE — 3044F PR MOST RECENT HEMOGLOBIN A1C LEVEL <7.0%: ICD-10-PCS | Mod: CPTII,,, | Performed by: PHYSICIAN ASSISTANT

## 2023-03-13 NOTE — PATIENT INSTRUCTIONS
1) FIBER - metamucil     2) water- MORE WATER LESS JUICE    3) start exercising - walking 10 mins a day, next week add 3 mins a day     TURMERIC AND HORTENSIA TEA- add local honey and lemon

## 2023-03-14 NOTE — PROGRESS NOTES
"Subjective:       Patient ID: Adeel Gordon is a 48 y.o. female.      Lifestyle related medical issues:     Hypertension   Weight     Past Medical History:   Diagnosis Date    Benign essential hypertension 1/30/2014    Class 1 obesity due to excess calories with serious comorbidity and body mass index (BMI) of 34.0 to 34.9 in adult 12/3/2015    Class 2 severe obesity due to excess calories with serious comorbidity and body mass index (BMI) of 38.0 to 38.9 in adult 12/3/2015    Depression     not taking meds     Generalized anxiety disorder 3/28/2022    History of endometrial ablation 07/2017    Hypertension     Iron deficiency anemia due to chronic blood loss 1/6/2017    Obesity     S/P tubal ligation 07/2017    Severe obesity with body mass index (BMI) of 36.0 to 36.9 with serious comorbidity 12/3/2015    Tobacco use disorder      Social Determinants of Health with Concerns     Tobacco Use: High Risk    Smoking Tobacco Use: Every Day    Smokeless Tobacco Use: Never    Passive Exposure: Not on file   Alcohol Use: Not on file   Financial Resource Strain: Not on file   Food Insecurity: Not on file   Transportation Needs: Not on file   Physical Activity: Not on file   Stress: Not on file   Social Connections: Not on file   Housing Stability: Not on file     Past Surgical History:   Procedure Laterality Date    ENDOMETRIAL ABLATION  02/28/2017    TUBAL LIGATION  02/28/2017    VAGINAL DELIVERY      x 3     Family History   Problem Relation Age of Onset    No Known Problems Sister     No Known Problems Sister     No Known Problems Brother     Kidney disease Son     Hypertension Son     Cancer Father         THROAT    Hypertension Father     Hypertension Mother           Physical activity:  low, has pain     Diet:  "everything"  Did have baked food last night   Picky, doesn't eat many veggie     Weight goal 250    Wt Readings from Last 3 Encounters:   03/13/23 113.3 kg (249 lb 12.5 oz)   02/20/23 112.2 kg (247 lb 5.7 " "oz)   02/10/23 112.3 kg (247 lb 9.2 oz)       Current Outpatient Medications   Medication Instructions    amlodipine-valsartan (EXFORGE)  mg per tablet 1 tablet, Oral, Daily            Review of Systems   Constitutional:  Negative for fatigue, fever and unexpected weight change.   HENT:  Negative for sore throat and trouble swallowing.    Respiratory:  Negative for cough and shortness of breath.    Cardiovascular:  Negative for chest pain.   Gastrointestinal:  Negative for abdominal pain.   Musculoskeletal:  Positive for arthralgias.   Hematological:  Negative for adenopathy. Does not bruise/bleed easily.   All other systems reviewed and are negative.    Objective:   /82 (BP Location: Right arm, Patient Position: Sitting, BP Method: Large (Manual))   Pulse 84   Temp 97 °F (36.1 °C) (Tympanic)   Ht 5' 6" (1.676 m)   Wt 113.3 kg (249 lb 12.5 oz)   LMP 03/09/2023 (Approximate)   SpO2 98%   BMI 40.32 kg/m²      Physical Exam  Constitutional:       Appearance: Normal appearance. She is obese.   HENT:      Head: Normocephalic and atraumatic.   Pulmonary:      Effort: Pulmonary effort is normal.   Neurological:      General: No focal deficit present.      Mental Status: She is alert and oriented to person, place, and time.         Lab Results   Component Value Date    WBC 7.61 02/20/2023    HGB 12.2 02/20/2023    HCT 37.5 02/20/2023     02/20/2023    CHOL 179 02/20/2023    TRIG 140 02/20/2023    HDL 40 02/20/2023    ALT 15 02/20/2023    AST 13 02/20/2023     02/20/2023    K 4.2 02/20/2023     02/20/2023    CREATININE 0.8 02/20/2023    BUN 7 02/20/2023    CO2 25 02/20/2023    TSH 1.073 02/20/2023    HGBA1C 5.7 (H) 02/20/2023       Assessment:       1. Benign essential hypertension    2. Morbid obesity with BMI of 40.0-44.9, adult    3. Prediabetes          Plan:   Benign essential hypertension    Morbid obesity with BMI of 40.0-44.9, adult    Prediabetes         1) FIBER - metamucil "     2) water- MORE WATER LESS JUICE    3) start exercising - walking 10 mins a day, next week add 3 mins a day     TURMERIC AND HORTENSIA TEA- add local honey and lemon    Time spent: 30 minutes in face to face and with review prior and after of chart notes from specialists, in regards to diagnosis, prognosis, review of lab and test results, benefits of treatment as well as management of disease, counseling of patient and coordination of care between various health care providers .      4/13/2023

## 2023-03-22 ENCOUNTER — PATIENT MESSAGE (OUTPATIENT)
Dept: ADMINISTRATIVE | Facility: HOSPITAL | Age: 48
End: 2023-03-22
Payer: MEDICAID

## 2023-04-18 ENCOUNTER — OFFICE VISIT (OUTPATIENT)
Dept: OBSTETRICS AND GYNECOLOGY | Facility: CLINIC | Age: 48
End: 2023-04-18
Payer: MEDICAID

## 2023-04-18 VITALS
BODY MASS INDEX: 39.82 KG/M2 | WEIGHT: 247.81 LBS | SYSTOLIC BLOOD PRESSURE: 120 MMHG | HEIGHT: 66 IN | DIASTOLIC BLOOD PRESSURE: 82 MMHG

## 2023-04-18 DIAGNOSIS — Z01.419 ENCOUNTER FOR GYNECOLOGICAL EXAMINATION WITHOUT ABNORMAL FINDING: Primary | ICD-10-CM

## 2023-04-18 PROCEDURE — 3079F PR MOST RECENT DIASTOLIC BLOOD PRESSURE 80-89 MM HG: ICD-10-PCS | Mod: CPTII,,, | Performed by: NURSE PRACTITIONER

## 2023-04-18 PROCEDURE — 3079F DIAST BP 80-89 MM HG: CPT | Mod: CPTII,,, | Performed by: NURSE PRACTITIONER

## 2023-04-18 PROCEDURE — 3008F PR BODY MASS INDEX (BMI) DOCUMENTED: ICD-10-PCS | Mod: CPTII,,, | Performed by: NURSE PRACTITIONER

## 2023-04-18 PROCEDURE — 4010F ACE/ARB THERAPY RXD/TAKEN: CPT | Mod: CPTII,,, | Performed by: NURSE PRACTITIONER

## 2023-04-18 PROCEDURE — 3074F PR MOST RECENT SYSTOLIC BLOOD PRESSURE < 130 MM HG: ICD-10-PCS | Mod: CPTII,,, | Performed by: NURSE PRACTITIONER

## 2023-04-18 PROCEDURE — 99213 OFFICE O/P EST LOW 20 MIN: CPT | Mod: PBBFAC | Performed by: NURSE PRACTITIONER

## 2023-04-18 PROCEDURE — 1160F RVW MEDS BY RX/DR IN RCRD: CPT | Mod: CPTII,,, | Performed by: NURSE PRACTITIONER

## 2023-04-18 PROCEDURE — 99999 PR PBB SHADOW E&M-EST. PATIENT-LVL III: ICD-10-PCS | Mod: PBBFAC,,, | Performed by: NURSE PRACTITIONER

## 2023-04-18 PROCEDURE — 99396 PREV VISIT EST AGE 40-64: CPT | Mod: S$PBB,,, | Performed by: NURSE PRACTITIONER

## 2023-04-18 PROCEDURE — 1160F PR REVIEW ALL MEDS BY PRESCRIBER/CLIN PHARMACIST DOCUMENTED: ICD-10-PCS | Mod: CPTII,,, | Performed by: NURSE PRACTITIONER

## 2023-04-18 PROCEDURE — 99999 PR PBB SHADOW E&M-EST. PATIENT-LVL III: CPT | Mod: PBBFAC,,, | Performed by: NURSE PRACTITIONER

## 2023-04-18 PROCEDURE — 99396 PR PREVENTIVE VISIT,EST,40-64: ICD-10-PCS | Mod: S$PBB,,, | Performed by: NURSE PRACTITIONER

## 2023-04-18 PROCEDURE — 1159F MED LIST DOCD IN RCRD: CPT | Mod: CPTII,,, | Performed by: NURSE PRACTITIONER

## 2023-04-18 PROCEDURE — 4010F PR ACE/ARB THEARPY RXD/TAKEN: ICD-10-PCS | Mod: CPTII,,, | Performed by: NURSE PRACTITIONER

## 2023-04-18 PROCEDURE — 3008F BODY MASS INDEX DOCD: CPT | Mod: CPTII,,, | Performed by: NURSE PRACTITIONER

## 2023-04-18 PROCEDURE — 3074F SYST BP LT 130 MM HG: CPT | Mod: CPTII,,, | Performed by: NURSE PRACTITIONER

## 2023-04-18 PROCEDURE — 3044F HG A1C LEVEL LT 7.0%: CPT | Mod: CPTII,,, | Performed by: NURSE PRACTITIONER

## 2023-04-18 PROCEDURE — 3044F PR MOST RECENT HEMOGLOBIN A1C LEVEL <7.0%: ICD-10-PCS | Mod: CPTII,,, | Performed by: NURSE PRACTITIONER

## 2023-04-18 PROCEDURE — 1159F PR MEDICATION LIST DOCUMENTED IN MEDICAL RECORD: ICD-10-PCS | Mod: CPTII,,, | Performed by: NURSE PRACTITIONER

## 2023-04-18 NOTE — PROGRESS NOTES
CC: Well woman exam    Adeel Gordon is a 48 y.o. female  presents for well woman exam.  LMP: Patient's last menstrual period was 2023..    Pap and HPV negative  Mmg  - normal      Past Medical History:   Diagnosis Date    Benign essential hypertension 2014    Class 1 obesity due to excess calories with serious comorbidity and body mass index (BMI) of 34.0 to 34.9 in adult 12/3/2015    Class 2 severe obesity due to excess calories with serious comorbidity and body mass index (BMI) of 38.0 to 38.9 in adult 12/3/2015    Depression     not taking meds     Generalized anxiety disorder 3/28/2022    History of endometrial ablation 2017    Hypertension     Iron deficiency anemia due to chronic blood loss 2017    Obesity     S/P tubal ligation 2017    Severe obesity with body mass index (BMI) of 36.0 to 36.9 with serious comorbidity 12/3/2015    Tobacco use disorder      Past Surgical History:   Procedure Laterality Date    ENDOMETRIAL ABLATION  2017    TUBAL LIGATION  2017    VAGINAL DELIVERY      x 3     Social History     Socioeconomic History    Marital status:     Number of children: 3   Occupational History    Occupation:      Employer: [employer]     Comment: [employer]   Tobacco Use    Smoking status: Every Day     Packs/day: 0.20     Years: 20.00     Pack years: 4.00     Types: Vaping with nicotine, Cigarettes    Smokeless tobacco: Never    Tobacco comments:     Vape pen >10 x / day   Substance and Sexual Activity    Alcohol use: No     Alcohol/week: 0.0 standard drinks    Drug use: No    Sexual activity: Yes     Partners: Male     Birth control/protection: None     Family History   Problem Relation Age of Onset    No Known Problems Sister     No Known Problems Sister     No Known Problems Brother     Kidney disease Son     Hypertension Son     Cancer Father         THROAT    Hypertension Father     Hypertension  "Mother      OB History          3    Para   3    Term   3            AB        Living   3         SAB        IAB        Ectopic        Multiple        Live Births   3                 /82 (BP Location: Left arm, Patient Position: Sitting, BP Method: Large (Manual))   Ht 5' 6" (1.676 m)   Wt 112.4 kg (247 lb 12.8 oz)   LMP 2023   BMI 40.00 kg/m²       ROS:  GENERAL: Denies weight gain or weight loss. Feeling well overall.   SKIN: Denies rash or lesions.   HEAD: Denies head injury or headache.   NODES: Denies enlarged lymph nodes.   CHEST: Denies chest pain or shortness of breath.   CARDIOVASCULAR: Denies palpitations or left sided chest pain.   ABDOMEN: No abdominal pain, constipation, diarrhea, nausea, vomiting or rectal bleeding.   URINARY: No frequency, dysuria, hematuria, or burning on urination.  REPRODUCTIVE: See HPI.   BREASTS: The patient performs breast self-examination and denies pain, lumps, or nipple discharge.   HEMATOLOGIC: No easy bruisability or excessive bleeding.   MUSCULOSKELETAL: Denies joint pain or swelling.   NEUROLOGIC: Denies syncope or weakness.   PSYCHIATRIC: Denies depression, anxiety or mood swings.    PHYSICAL EXAM:  APPEARANCE: Well nourished, well developed, in no acute distress.  AFFECT: WNL, alert and oriented x 3  SKIN: No acne or hirsutism  NECK: Neck symmetric without masses or thyromegaly  NODES: No inguinal, cervical, axillary, or femoral lymph node enlargement  CHEST: Good respiratory effect  ABDOMEN: Soft.  No tenderness or masses.  No hepatosplenomegaly.  No hernias.  BREASTS: Symmetrical, no skin changes or visible lesions.  No palpable masses, nipple discharge bilaterally.  PELVIC: Normal external genitalia without lesions.  Normal hair distribution.  Adequate perineal body, normal urethral meatus.  Vagina moist and well rugated without lesions or discharge.  Cervix pink, without lesions, discharge or tenderness.  No significant cystocele or " rectocele.  Bimanual exam shows uterus to be normal size, regular, mobile and nontender.  Adnexa without masses or tenderness.    EXTREMITIES: No edema.  Physical Exam    1. Encounter for gynecological examination without abnormal finding         AND PLAN:    Patient was counseled today on A.C.S. Pap guidelines and recommendations for yearly pelvic exams, mammograms and monthly self breast exams; to see her PCP for other health maintenance.

## 2023-04-19 ENCOUNTER — PATIENT MESSAGE (OUTPATIENT)
Dept: ADMINISTRATIVE | Facility: HOSPITAL | Age: 48
End: 2023-04-19
Payer: MEDICAID

## 2023-05-14 DIAGNOSIS — I10 BENIGN ESSENTIAL HYPERTENSION: Chronic | ICD-10-CM

## 2023-05-14 RX ORDER — AMLODIPINE AND VALSARTAN 10; 320 MG/1; MG/1
TABLET ORAL
Qty: 90 TABLET | Refills: 0 | Status: SHIPPED | OUTPATIENT
Start: 2023-05-14 | End: 2023-08-14

## 2023-05-14 NOTE — TELEPHONE ENCOUNTER
Care Due:                  Date            Visit Type   Department     Provider  --------------------------------------------------------------------------------                                EP -                              PRIMARY      [facility]  Last Visit: 06-      CARE ([facility])   MEDICINE       Roberth Leigh  Next Visit: None Scheduled  None         None Found                                                            Last  Test          Frequency    Reason                     Performed    Due Date  --------------------------------------------------------------------------------    Office Visit  12 months..  amlodipine-valsartan.....  06- 06-    [facility] Embedded Care Due Messages. Reference number: [id number].   5/14/2023 5:13:41 AM CDT

## 2023-05-14 NOTE — TELEPHONE ENCOUNTER
Refill Decision Note   Adeel Gordon  is requesting a refill authorization.  Brief Assessment and Rationale for Refill:  Approve     Medication Therapy Plan:       Medication Reconciliation Completed: No   Comments:     Provider Staff:     Action is required for this patient.   Please see care gap opportunities below in Care Due Message.     Thanks!  [facility]     Appointments      Date Provider   Last Visit   6/7/2022 KAREN Leigh MD   Next Visit   Visit date not found KAREN Leigh MD     Note composed:6:24 PM 05/14/2023           Note composed:6:24 PM 05/14/2023

## 2023-08-08 ENCOUNTER — TELEPHONE (OUTPATIENT)
Dept: INTERNAL MEDICINE | Facility: CLINIC | Age: 48
End: 2023-08-08
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
## 2023-08-11 ENCOUNTER — OFFICE VISIT (OUTPATIENT)
Dept: INTERNAL MEDICINE | Facility: CLINIC | Age: 48
End: 2023-08-11
Payer: MEDICAID

## 2023-08-11 VITALS
DIASTOLIC BLOOD PRESSURE: 88 MMHG | SYSTOLIC BLOOD PRESSURE: 126 MMHG | OXYGEN SATURATION: 98 % | HEART RATE: 100 BPM | BODY MASS INDEX: 40.18 KG/M2 | WEIGHT: 250 LBS | TEMPERATURE: 99 F | HEIGHT: 66 IN

## 2023-08-11 DIAGNOSIS — K21.9 GASTROESOPHAGEAL REFLUX DISEASE, UNSPECIFIED WHETHER ESOPHAGITIS PRESENT: Primary | ICD-10-CM
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
## 2023-08-11 PROCEDURE — 99213 PR OFFICE/OUTPT VISIT, EST, LEVL III, 20-29 MIN: ICD-10-PCS | Mod: S$PBB,,, | Performed by: PHYSICIAN ASSISTANT

## 2023-08-11 PROCEDURE — 99214 OFFICE O/P EST MOD 30 MIN: CPT | Mod: PBBFAC | Performed by: PHYSICIAN ASSISTANT

## 2023-08-11 PROCEDURE — 99213 OFFICE O/P EST LOW 20 MIN: CPT | Mod: S$PBB,,, | Performed by: PHYSICIAN ASSISTANT

## 2023-08-11 PROCEDURE — 99999 PR PBB SHADOW E&M-EST. PATIENT-LVL IV: ICD-10-PCS | Mod: PBBFAC,,, | Performed by: PHYSICIAN ASSISTANT

## 2023-08-11 PROCEDURE — 99999 PR PBB SHADOW E&M-EST. PATIENT-LVL IV: CPT | Mod: PBBFAC,,, | Performed by: PHYSICIAN ASSISTANT

## 2023-08-11 RX ORDER — FAMOTIDINE 20 MG/1
20 TABLET, FILM COATED ORAL 2 TIMES DAILY
Qty: 60 TABLET | Refills: 0 | Status: SHIPPED | OUTPATIENT
Start: 2023-08-11 | End: 2023-09-13
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
## 2023-09-12 DIAGNOSIS — K21.9 GASTROESOPHAGEAL REFLUX DISEASE, UNSPECIFIED WHETHER ESOPHAGITIS PRESENT: ICD-10-CM

## 2023-09-13 RX ORDER — FAMOTIDINE 20 MG/1
20 TABLET, FILM COATED ORAL 2 TIMES DAILY
Qty: 60 TABLET | Refills: 0 | Status: SHIPPED | OUTPATIENT
Start: 2023-09-13 | End: 2023-10-18

## 2023-10-16 DIAGNOSIS — K21.9 GASTROESOPHAGEAL REFLUX DISEASE, UNSPECIFIED WHETHER ESOPHAGITIS PRESENT: ICD-10-CM

## 2023-10-18 DIAGNOSIS — K21.9 GASTROESOPHAGEAL REFLUX DISEASE, UNSPECIFIED WHETHER ESOPHAGITIS PRESENT: ICD-10-CM

## 2023-10-18 RX ORDER — FAMOTIDINE 20 MG/1
20 TABLET, FILM COATED ORAL 2 TIMES DAILY
Qty: 60 TABLET | Refills: 0 | Status: SHIPPED | OUTPATIENT
Start: 2023-10-18 | End: 2023-10-19

## 2023-10-19 RX ORDER — FAMOTIDINE 20 MG/1
20 TABLET, FILM COATED ORAL 2 TIMES DAILY
Qty: 180 TABLET | Refills: 1 | Status: SHIPPED | OUTPATIENT
Start: 2023-10-19 | End: 2023-11-20

## 2023-11-11 DIAGNOSIS — I10 BENIGN ESSENTIAL HYPERTENSION: Chronic | ICD-10-CM

## 2023-11-11 NOTE — TELEPHONE ENCOUNTER
Care Due:                  Date            Visit Type   Department     Provider  --------------------------------------------------------------------------------                                EP -                              PRIMARY      [facility]  Last Visit: 06-      CARE ([facility])   MEDICINE       Roberth Leigh  Next Visit: None Scheduled  None         None Found                                                            Last  Test          Frequency    Reason                     Performed    Due Date  --------------------------------------------------------------------------------    Office Visit  15 months..  amlodipine-valsartan.....  06- 08-    [facility] Embedded Care Due Messages. Reference number: [id number].   11/11/2023 5:08:31 AM CST

## 2023-11-13 NOTE — TELEPHONE ENCOUNTER
Refill Routing Note   Medication(s) are not appropriate for processing by [facility] for the following reason(s):      Patient not seen by provider within 15 months    [facility] action(s):  Defer Care Due:  Appointment due            Appointments  past 12m or future 3m with PCP    Date Provider   Last Visit   6/7/2022 KAREN Leigh MD   Next Visit   Visit date not found KAREN Leigh MD   ED visits in past 90 days: 0        Note composed:8:12 AM 11/13/2023

## 2023-11-17 DIAGNOSIS — I10 BENIGN ESSENTIAL HYPERTENSION: Chronic | ICD-10-CM

## 2023-11-17 RX ORDER — AMLODIPINE AND VALSARTAN 10; 320 MG/1; MG/1
TABLET ORAL
Qty: 14 TABLET | Refills: 2 | Status: SHIPPED | OUTPATIENT
Start: 2023-11-17 | End: 2023-11-20 | Stop reason: SDUPTHER

## 2023-11-17 RX ORDER — AMLODIPINE AND VALSARTAN 10; 320 MG/1; MG/1
1 TABLET ORAL DAILY
Qty: 90 TABLET | Refills: 0 | OUTPATIENT
Start: 2023-11-17

## 2023-11-17 RX ORDER — AMLODIPINE AND VALSARTAN 10; 320 MG/1; MG/1
1 TABLET ORAL DAILY
Qty: 90 TABLET | Refills: 0 | Status: CANCELLED | OUTPATIENT
Start: 2023-11-17

## 2023-11-17 NOTE — TELEPHONE ENCOUNTER
No care due was identified.  [facility] Embedded Care Due Messages. Reference number: [id number].   11/17/2023 10:30:56 AM CST

## 2023-11-17 NOTE — TELEPHONE ENCOUNTER
----- Message from Sumaya Mcgraw sent at 11/17/2023 10:10 AM CST -----  Contact: pt  Type:  RX Refill Request    Who Called: pt  Refill or New Rx:refill  RX Name and Strength:amlodipine-valsartan (EXFORGE)  mg per tablet  How is the patient currently taking it? (ex. 1XDay):  Is this a 30 day or 90 day RX:  Preferred Pharmacy with phone number: [phone]  Local or Mail Order: local  Ordering Provider:shirley  Would the patient rather a call back or a response via [facility]?   Best Call Back Number:  Additional Information:       [id number] - [place] - [address] AT [facility]  [address] [address]  Phone: [phone] Fax: [phone]

## 2023-11-17 NOTE — TELEPHONE ENCOUNTER
Refill Decision Note   Adeel Gordon  is requesting a refill authorization.  Brief Assessment and Rationale for Refill:  Quick Discontinue     Medication Therapy Plan:  Pended in another encounter      Comments:     Note composed:10:38 AM 11/17/2023

## 2023-11-17 NOTE — TELEPHONE ENCOUNTER
Limited refill approved.  In-office appointment required for more refills.    TO MY TEAM:  Schedule soonest available appointment with me.  After they have scheduled appointment with me, they can re-submit refill request if additional refills needed to last until that appointment.

## 2023-11-20 ENCOUNTER — OFFICE VISIT (OUTPATIENT)
Dept: INTERNAL MEDICINE | Facility: CLINIC | Age: 48
End: 2023-11-20
Payer: MEDICAID

## 2023-11-20 ENCOUNTER — PATIENT MESSAGE (OUTPATIENT)
Dept: INTERNAL MEDICINE | Facility: CLINIC | Age: 48
End: 2023-11-20

## 2023-11-20 VITALS
TEMPERATURE: 98 F | WEIGHT: 253.31 LBS | SYSTOLIC BLOOD PRESSURE: 130 MMHG | HEIGHT: 65 IN | RESPIRATION RATE: 18 BRPM | DIASTOLIC BLOOD PRESSURE: 88 MMHG | BODY MASS INDEX: 42.2 KG/M2 | OXYGEN SATURATION: 97 % | HEART RATE: 101 BPM

## 2023-11-20 DIAGNOSIS — I10 BENIGN ESSENTIAL HYPERTENSION: ICD-10-CM

## 2023-11-20 DIAGNOSIS — Z12.12 SCREENING FOR COLORECTAL CANCER: ICD-10-CM

## 2023-11-20 DIAGNOSIS — R73.03 PREDIABETES: ICD-10-CM

## 2023-11-20 DIAGNOSIS — Z12.11 SCREENING FOR COLORECTAL CANCER: ICD-10-CM

## 2023-11-20 DIAGNOSIS — E78.2 MIXED HYPERLIPIDEMIA: ICD-10-CM

## 2023-11-20 DIAGNOSIS — R39.15 URINARY URGENCY: ICD-10-CM

## 2023-11-20 DIAGNOSIS — Z12.31 BREAST CANCER SCREENING BY MAMMOGRAM: ICD-10-CM

## 2023-11-20 DIAGNOSIS — E66.01 CLASS 3 SEVERE OBESITY DUE TO EXCESS CALORIES WITH SERIOUS COMORBIDITY AND BODY MASS INDEX (BMI) OF 40.0 TO 44.9 IN ADULT: ICD-10-CM

## 2023-11-20 DIAGNOSIS — I10 PRIMARY HYPERTENSION: Primary | ICD-10-CM

## 2023-11-20 PROCEDURE — 99214 PR OFFICE/OUTPT VISIT, EST, LEVL IV, 30-39 MIN: ICD-10-PCS | Mod: S$PBB,,, | Performed by: FAMILY MEDICINE

## 2023-11-20 PROCEDURE — 3051F HG A1C>EQUAL 7.0%<8.0%: CPT | Mod: CPTII,,, | Performed by: FAMILY MEDICINE

## 2023-11-20 PROCEDURE — 99214 OFFICE O/P EST MOD 30 MIN: CPT | Mod: PBBFAC | Performed by: FAMILY MEDICINE

## 2023-11-20 PROCEDURE — 3075F SYST BP GE 130 - 139MM HG: CPT | Mod: CPTII,,, | Performed by: FAMILY MEDICINE

## 2023-11-20 PROCEDURE — 3079F DIAST BP 80-89 MM HG: CPT | Mod: CPTII,,, | Performed by: FAMILY MEDICINE

## 2023-11-20 PROCEDURE — 3075F PR MOST RECENT SYSTOLIC BLOOD PRESS GE 130-139MM HG: ICD-10-PCS | Mod: CPTII,,, | Performed by: FAMILY MEDICINE

## 2023-11-20 PROCEDURE — 99999 PR PBB SHADOW E&M-EST. PATIENT-LVL IV: CPT | Mod: PBBFAC,,, | Performed by: FAMILY MEDICINE

## 2023-11-20 PROCEDURE — 99214 OFFICE O/P EST MOD 30 MIN: CPT | Mod: S$PBB,,, | Performed by: FAMILY MEDICINE

## 2023-11-20 PROCEDURE — 4010F ACE/ARB THERAPY RXD/TAKEN: CPT | Mod: CPTII,,, | Performed by: FAMILY MEDICINE

## 2023-11-20 PROCEDURE — 99999 PR PBB SHADOW E&M-EST. PATIENT-LVL IV: ICD-10-PCS | Mod: PBBFAC,,, | Performed by: FAMILY MEDICINE

## 2023-11-20 PROCEDURE — 3008F PR BODY MASS INDEX (BMI) DOCUMENTED: ICD-10-PCS | Mod: CPTII,,, | Performed by: FAMILY MEDICINE

## 2023-11-20 PROCEDURE — 3079F PR MOST RECENT DIASTOLIC BLOOD PRESSURE 80-89 MM HG: ICD-10-PCS | Mod: CPTII,,, | Performed by: FAMILY MEDICINE

## 2023-11-20 PROCEDURE — 4010F PR ACE/ARB THEARPY RXD/TAKEN: ICD-10-PCS | Mod: CPTII,,, | Performed by: FAMILY MEDICINE

## 2023-11-20 PROCEDURE — 3008F BODY MASS INDEX DOCD: CPT | Mod: CPTII,,, | Performed by: FAMILY MEDICINE

## 2023-11-20 PROCEDURE — 3051F PR MOST RECENT HEMOGLOBIN A1C LEVEL 7.0 - < 8.0%: ICD-10-PCS | Mod: CPTII,,, | Performed by: FAMILY MEDICINE

## 2023-11-20 RX ORDER — AMLODIPINE AND VALSARTAN 10; 320 MG/1; MG/1
1 TABLET ORAL DAILY
Qty: 90 TABLET | Refills: 0 | Status: SHIPPED | OUTPATIENT
Start: 2023-11-20 | End: 2023-12-06 | Stop reason: SDUPTHER

## 2023-11-20 NOTE — PATIENT INSTRUCTIONS
RECOMMENDED IMMUNIZATIONS  Flu vaccination: The flu vaccine is a shot that protects against the flu. The flu is a highly contagious respiratory illness that can cause severe illness and even death, especially in older adults, young children, and people with weakened immune systems. The vaccine is recommended for everyone over the age of 6 months.  COVID-19 vaccination: The COVID-19 vaccine protects against COVID-19 (coronavirus disease 2019), a disease caused by a virus named SARS-CoV-2 discovered in December 2019. It is very contagious and has quickly spread around the world. COVID-19 most often causes respiratory symptoms that can feel much like a cold, the flu, or pneumonia. However, COVID-19 can attack more than the lungs and respiratory system. The disease may also affect the heart, brain, and other parts of the body. Most people with COVID-19 have mild symptoms, but some people become severely ill and can have long-lasting symptoms, including shortness of breath, fatigue, and memory problems. People fully vaccinated against COVID-19 have much lower rates of severe illness, hospitalization, and death from COVID-19 compared to those not fully vaccinated.  Pneumococcal vaccination: The pneumococcal vaccine protects against pneumococcal disease, which is caused by bacteria that can cause pneumonia, meningitis, and blood infections. The vaccine is recommended for adults over the age of 65, as well as for younger adults who have certain medical conditions.

## 2023-11-20 NOTE — ASSESSMENT & PLAN NOTE
BP Readings from Last 6 Encounters:   11/20/23 130/88   08/11/23 126/88   04/18/23 120/82   03/13/23 118/82   02/20/23 120/84   01/10/23 116/84     Lab Results   Component Value Date    EGFRNORACEVR >60.0 02/20/2023    CREATININE 0.8 02/20/2023    BUN 7 02/20/2023    K 4.2 02/20/2023     02/20/2023     02/20/2023     Results for orders placed or performed during the hospital encounter of 02/17/22   EKG 12-lead    Collection Time: 02/17/22  9:31 AM    Narrative    Test Reason : Z76.89    Vent. Rate : 078 BPM     Atrial Rate : 078 BPM     P-R Int : 152 ms          QRS Dur : 088 ms      QT Int : 368 ms       P-R-T Axes : 052 020 -08 degrees     QTc Int : 419 ms    Normal sinus rhythm  Inferior infarct ,age undetermined  Cannot rule out Anterior infarct ,age undetermined  Abnormal ECG  When compared with ECG of 10-FEB-2017 15:31,  Inferior infarct is now Present  T wave inversion now evident in Inferior leads  Confirmed by KARINA GILLESPIE MD ([id number]) on 2/17/2022 11:19:01 PM    Referred By: OLGA SELBY           Confirmed By:KARINA GILLESPIE MD

## 2023-11-20 NOTE — Clinical Note
Schedule fasting labs soon. Schedule EKG and Nurse Visit blood pressure check soon. Schedule video visit with me a few days after Nurse Visit blood pressure check. Schedule mammogram on or after 2/10/24.

## 2023-11-20 NOTE — PROGRESS NOTES
OFFICE VISIT 11/20/23  4:40 PM CST    CHIEF COMPLAINT: Follow-up    HPI  1. Primary hypertension  Assessment & Plan:  BP Readings from Last 6 Encounters:   11/20/23 130/88   08/11/23 126/88   04/18/23 120/82   03/13/23 118/82   02/20/23 120/84   01/10/23 116/84     Lab Results   Component Value Date    EGFRNORACEVR >60.0 02/20/2023    CREATININE 0.8 02/20/2023    BUN 7 02/20/2023    K 4.2 02/20/2023     02/20/2023     02/20/2023     Results for orders placed or performed during the hospital encounter of 02/17/22   EKG 12-lead    Collection Time: 02/17/22  9:31 AM    Narrative    Test Reason : Z76.89    Vent. Rate : 078 BPM     Atrial Rate : 078 BPM     P-R Int : 152 ms          QRS Dur : 088 ms      QT Int : 368 ms       P-R-T Axes : 052 020 -08 degrees     QTc Int : 419 ms    Normal sinus rhythm  Inferior infarct ,age undetermined  Cannot rule out Anterior infarct ,age undetermined  Abnormal ECG  When compared with ECG of 10-FEB-2017 15:31,  Inferior infarct is now Present  T wave inversion now evident in Inferior leads  Confirmed by VERNA HINOJOSA, KARINA ([id number]) on 2/17/2022 11:19:01 PM    Referred By: OLGA SELBY           Confirmed By:KARINA GILLESPIE MD        Orders:  -     amlodipine-valsartan (EXFORGE)  mg per tablet; Take 1 tablet by mouth once daily.  Dispense: 90 tablet; Refill: 0  -     Lipid Panel; Future; Expected date: 11/20/2023  -     Comprehensive Metabolic Panel; Future; Expected date: 11/20/2023  -     SCHEDULED EKG 12-LEAD (to Muse); Future    2. Mixed hyperlipidemia  -     Lipid Panel; Future; Expected date: 11/20/2023  -     Comprehensive Metabolic Panel; Future; Expected date: 11/20/2023    3. Breast cancer screening by mammogram  -     Mammo Digital Screening Bilat; Future; Expected date: 02/10/2024    4. Screening for colorectal cancer  -     Cologuard Screening (Multitarget Stool DNA); Future; Expected date: 11/20/2023    5. Prediabetes  -     Hemoglobin A1C; Future; Expected  date: 11/20/2023    6. Benign essential hypertension  Assessment & Plan:  BP Readings from Last 6 Encounters:   11/20/23 130/88   08/11/23 126/88   04/18/23 120/82   03/13/23 118/82   02/20/23 120/84   01/10/23 116/84     Lab Results   Component Value Date    EGFRNORACEVR >60.0 02/20/2023    CREATININE 0.8 02/20/2023    BUN 7 02/20/2023    K 4.2 02/20/2023     02/20/2023     02/20/2023     Results for orders placed or performed during the hospital encounter of 02/17/22   EKG 12-lead    Collection Time: 02/17/22  9:31 AM    Narrative    Test Reason : Z76.89    Vent. Rate : 078 BPM     Atrial Rate : 078 BPM     P-R Int : 152 ms          QRS Dur : 088 ms      QT Int : 368 ms       P-R-T Axes : 052 020 -08 degrees     QTc Int : 419 ms    Normal sinus rhythm  Inferior infarct ,age undetermined  Cannot rule out Anterior infarct ,age undetermined  Abnormal ECG  When compared with ECG of 10-FEB-2017 15:31,  Inferior infarct is now Present  T wave inversion now evident in Inferior leads  Confirmed by KARINA GILLESPIE MD ([id number]) on 2/17/2022 11:19:01 PM    Referred By: OLGA SELBY           Confirmed By:KARINA GILLESPIE MD          7. Class 3 severe obesity due to excess calories with serious comorbidity and body mass index (BMI) of 40.0 to 44.9 in adult  -     Ambulatory referral/consult to [facility]; Future; Expected date: 11/27/2023    8. Urinary urgency  -     Urinalysis; Future; Expected date: 11/20/2023    Unless noted herein, any chronic conditions are represented as and appear stable, and no other significant complaints or concerns were reported.    Future Appointments   Date Time Provider Department Center   12/5/2023  3:20 PM KAREN Leigh MD [facility] VIRI [place]   2/12/2024  8:00 AM [facility] MAMMO3-BX [facility] MAMMO [place]   7/1/2024  2:00 PM Shandra Carson MD [facility]      Schedule fasting labs soon.  Schedule EKG and Nurse Visit blood pressure check soon.  Schedule video  "visit with me a few days after Nurse Visit blood pressure check.  Schedule mammogram on or after 2/10/24.    Future Appointments   Date Time Provider Department Center   12/5/2023  3:20 PM KAREN Leigh MD [facility] IM [facility]   2/12/2024  8:00 AM [facility] MAMMO3-BX [facility] MAMMO [place]   7/1/2024  2:00 PM Shandra Carson MD [facility]       Review of Systems   Respiratory:  Negative for chest tightness and shortness of breath.    Cardiovascular:  Negative for chest pain.   Endocrine: Negative for polydipsia and polyuria.       Vitals:    11/20/23 1619   BP: 130/88   BP Location: Right arm   Patient Position: Sitting   BP Method: Large (Manual)   Pulse: 101   Resp: 18   Temp: 97.7 °F (36.5 °C)   SpO2: 97%   Weight: 114.9 kg (253 lb 4.9 oz)   Height: 5' 5" (1.651 m)   Physical Exam  Vitals reviewed.   Constitutional:       General: She is not in acute distress.     Appearance: Normal appearance. She is not ill-appearing or diaphoretic.   Cardiovascular:      Rate and Rhythm: Normal rate and regular rhythm.   Pulmonary:      Effort: Pulmonary effort is normal.      Breath sounds: Normal breath sounds.   Skin:     General: Skin is warm and dry.   Neurological:      Mental Status: She is alert and oriented to person, place, and time. Mental status is at baseline.   Psychiatric:         Mood and Affect: Mood normal.         Behavior: Behavior normal.         Judgment: Judgment normal.       Documentation entered by me for this encounter may have been done in part using speech-recognition technology. Although I have made an effort to ensure accuracy, "sound like" errors may exist and should be interpreted in context.  "

## 2023-11-21 DIAGNOSIS — K21.9 GASTROESOPHAGEAL REFLUX DISEASE, UNSPECIFIED WHETHER ESOPHAGITIS PRESENT: ICD-10-CM

## 2023-11-21 RX ORDER — FAMOTIDINE 20 MG/1
20 TABLET, FILM COATED ORAL 2 TIMES DAILY
Qty: 180 TABLET | Refills: 1 | Status: SHIPPED | OUTPATIENT
Start: 2023-11-21

## 2023-11-21 NOTE — TELEPHONE ENCOUNTER
Pt is requesting an order for colonoscopy to be placed. States she was order an at home colon test but would like to have an colonoscopy ordered instead. Please advise.//ddw

## 2023-11-21 NOTE — TELEPHONE ENCOUNTER
----- Message from KAREN Leigh MD sent at 11/20/2023  6:00 PM CST -----  Schedule fasting labs soon.  Schedule EKG and Nurse Visit blood pressure check soon.  Schedule video visit with me a few days after Nurse Visit blood pressure check.  Schedule mammogram on or after 2/10/24.

## 2023-11-21 NOTE — TELEPHONE ENCOUNTER
----- Message from Mckayla Quinteros sent at 11/21/2023  7:33 AM CST -----  Pt stated the colon test was ordered for her to do at home but she would like an order for a colonoscopy. She is requesting a call back at .[phone]. Thx.EL

## 2023-11-28 ENCOUNTER — TELEPHONE (OUTPATIENT)
Dept: INTERNAL MEDICINE | Facility: CLINIC | Age: 48
End: 2023-11-28

## 2023-11-28 ENCOUNTER — HOSPITAL ENCOUNTER (OUTPATIENT)
Dept: CARDIOLOGY | Facility: HOSPITAL | Age: 48
Discharge: HOME | End: 2023-11-28
Attending: FAMILY MEDICINE
Payer: MEDICAID

## 2023-11-28 ENCOUNTER — CLINICAL SUPPORT (OUTPATIENT)
Dept: INTERNAL MEDICINE | Facility: CLINIC | Age: 48
End: 2023-11-28
Payer: MEDICAID

## 2023-11-28 VITALS — SYSTOLIC BLOOD PRESSURE: 122 MMHG | DIASTOLIC BLOOD PRESSURE: 86 MMHG

## 2023-11-28 DIAGNOSIS — Z12.11 SCREENING FOR COLORECTAL CANCER: Primary | ICD-10-CM

## 2023-11-28 DIAGNOSIS — Z12.12 SCREENING FOR COLORECTAL CANCER: Primary | ICD-10-CM

## 2023-11-28 DIAGNOSIS — I10 PRIMARY HYPERTENSION: Primary | ICD-10-CM

## 2023-11-28 DIAGNOSIS — I10 PRIMARY HYPERTENSION: ICD-10-CM

## 2023-11-28 PROCEDURE — 99999 PR PBB SHADOW E&M-EST. PATIENT-LVL II: ICD-10-PCS | Mod: PBBFAC,,,

## 2023-11-28 PROCEDURE — 93010 ELECTROCARDIOGRAM REPORT: CPT | Mod: ,,, | Performed by: INTERNAL MEDICINE

## 2023-11-28 PROCEDURE — 99999 PR PBB SHADOW E&M-EST. PATIENT-LVL II: CPT | Mod: PBBFAC,,,

## 2023-11-28 PROCEDURE — 93005 ELECTROCARDIOGRAM TRACING: CPT

## 2023-11-28 PROCEDURE — 93010 EKG 12-LEAD: ICD-10-PCS | Mod: ,,, | Performed by: INTERNAL MEDICINE

## 2023-11-28 PROCEDURE — 99212 OFFICE O/P EST SF 10 MIN: CPT | Mod: PBBFAC

## 2023-11-30 ENCOUNTER — TELEPHONE (OUTPATIENT)
Dept: INTERNAL MEDICINE | Facility: CLINIC | Age: 48
End: 2023-11-30
Payer: MEDICAID

## 2023-12-05 ENCOUNTER — TELEPHONE (OUTPATIENT)
Dept: INTERNAL MEDICINE | Facility: CLINIC | Age: 48
End: 2023-12-05
Payer: MEDICAID

## 2023-12-05 ENCOUNTER — OFFICE VISIT (OUTPATIENT)
Dept: INTERNAL MEDICINE | Facility: CLINIC | Age: 48
End: 2023-12-05
Payer: MEDICAID

## 2023-12-05 DIAGNOSIS — Z53.9 PROCEDURE NOT CARRIED OUT: Primary | ICD-10-CM

## 2023-12-05 PROCEDURE — 99499 UNLISTED E&M SERVICE: CPT | Mod: 95,,, | Performed by: FAMILY MEDICINE

## 2023-12-05 PROCEDURE — 99499 NO LOS: ICD-10-PCS | Mod: 95,,, | Performed by: FAMILY MEDICINE

## 2023-12-06 ENCOUNTER — TELEPHONE (OUTPATIENT)
Dept: INTERNAL MEDICINE | Facility: CLINIC | Age: 48
End: 2023-12-06
Payer: MEDICAID

## 2023-12-06 ENCOUNTER — OFFICE VISIT (OUTPATIENT)
Dept: INTERNAL MEDICINE | Facility: CLINIC | Age: 48
End: 2023-12-06
Payer: MEDICAID

## 2023-12-06 ENCOUNTER — PATIENT MESSAGE (OUTPATIENT)
Dept: INTERNAL MEDICINE | Facility: CLINIC | Age: 48
End: 2023-12-06

## 2023-12-06 ENCOUNTER — TELEPHONE (OUTPATIENT)
Dept: DIABETES | Facility: CLINIC | Age: 48
End: 2023-12-06

## 2023-12-06 DIAGNOSIS — E11.69 HYPERLIPIDEMIA ASSOCIATED WITH TYPE 2 DIABETES MELLITUS: ICD-10-CM

## 2023-12-06 DIAGNOSIS — I15.2 HYPERTENSION COMPLICATING DIABETES: ICD-10-CM

## 2023-12-06 DIAGNOSIS — E11.69 TYPE 2 DIABETES MELLITUS WITH MORBID OBESITY: Chronic | ICD-10-CM

## 2023-12-06 DIAGNOSIS — E78.5 HYPERLIPIDEMIA ASSOCIATED WITH TYPE 2 DIABETES MELLITUS: ICD-10-CM

## 2023-12-06 DIAGNOSIS — E11.69 TYPE 2 DIABETES MELLITUS WITH OTHER SPECIFIED COMPLICATION, WITHOUT LONG-TERM CURRENT USE OF INSULIN: Primary | ICD-10-CM

## 2023-12-06 DIAGNOSIS — E66.01 TYPE 2 DIABETES MELLITUS WITH MORBID OBESITY: Chronic | ICD-10-CM

## 2023-12-06 DIAGNOSIS — K21.9 GASTRO-ESOPHAGEAL REFLUX DISEASE WITHOUT ESOPHAGITIS: Chronic | ICD-10-CM

## 2023-12-06 DIAGNOSIS — Z12.12 SCREENING FOR COLORECTAL CANCER: ICD-10-CM

## 2023-12-06 DIAGNOSIS — E11.59 HYPERTENSION COMPLICATING DIABETES: ICD-10-CM

## 2023-12-06 DIAGNOSIS — Z12.11 SCREENING FOR COLORECTAL CANCER: ICD-10-CM

## 2023-12-06 PROBLEM — E78.2 MIXED HYPERLIPIDEMIA: Status: ACTIVE | Noted: 2023-12-06

## 2023-12-06 PROBLEM — E11.9 TYPE 2 DIABETES MELLITUS, WITHOUT LONG-TERM CURRENT USE OF INSULIN: Status: ACTIVE | Noted: 2023-12-06

## 2023-12-06 PROCEDURE — 4010F ACE/ARB THERAPY RXD/TAKEN: CPT | Mod: CPTII,95,, | Performed by: FAMILY MEDICINE

## 2023-12-06 PROCEDURE — 3066F PR DOCUMENTATION OF TREATMENT FOR NEPHROPATHY: ICD-10-PCS | Mod: CPTII,95,, | Performed by: FAMILY MEDICINE

## 2023-12-06 PROCEDURE — 3060F POS MICROALBUMINURIA REV: CPT | Mod: CPTII,95,, | Performed by: FAMILY MEDICINE

## 2023-12-06 PROCEDURE — 3066F NEPHROPATHY DOC TX: CPT | Mod: CPTII,95,, | Performed by: FAMILY MEDICINE

## 2023-12-06 PROCEDURE — 3051F PR MOST RECENT HEMOGLOBIN A1C LEVEL 7.0 - < 8.0%: ICD-10-PCS | Mod: CPTII,95,, | Performed by: FAMILY MEDICINE

## 2023-12-06 PROCEDURE — 3051F HG A1C>EQUAL 7.0%<8.0%: CPT | Mod: CPTII,95,, | Performed by: FAMILY MEDICINE

## 2023-12-06 PROCEDURE — 99214 PR OFFICE/OUTPT VISIT, EST, LEVL IV, 30-39 MIN: ICD-10-PCS | Mod: 95,,, | Performed by: FAMILY MEDICINE

## 2023-12-06 PROCEDURE — 1160F PR REVIEW ALL MEDS BY PRESCRIBER/CLIN PHARMACIST DOCUMENTED: ICD-10-PCS | Mod: CPTII,95,, | Performed by: FAMILY MEDICINE

## 2023-12-06 PROCEDURE — 1159F MED LIST DOCD IN RCRD: CPT | Mod: CPTII,95,, | Performed by: FAMILY MEDICINE

## 2023-12-06 PROCEDURE — 3060F PR POS MICROALBUMINURIA RESULT DOCUMENTED/REVIEW: ICD-10-PCS | Mod: CPTII,95,, | Performed by: FAMILY MEDICINE

## 2023-12-06 PROCEDURE — 4010F PR ACE/ARB THEARPY RXD/TAKEN: ICD-10-PCS | Mod: CPTII,95,, | Performed by: FAMILY MEDICINE

## 2023-12-06 PROCEDURE — 1159F PR MEDICATION LIST DOCUMENTED IN MEDICAL RECORD: ICD-10-PCS | Mod: CPTII,95,, | Performed by: FAMILY MEDICINE

## 2023-12-06 PROCEDURE — 1160F RVW MEDS BY RX/DR IN RCRD: CPT | Mod: CPTII,95,, | Performed by: FAMILY MEDICINE

## 2023-12-06 PROCEDURE — 99214 OFFICE O/P EST MOD 30 MIN: CPT | Mod: 95,,, | Performed by: FAMILY MEDICINE

## 2023-12-06 RX ORDER — AMLODIPINE AND VALSARTAN 10; 320 MG/1; MG/1
1 TABLET ORAL DAILY
Qty: 90 TABLET | Refills: 2 | Status: SHIPPED | OUTPATIENT
Start: 2023-12-06 | End: 2024-01-03 | Stop reason: SDUPTHER

## 2023-12-06 RX ORDER — METFORMIN HYDROCHLORIDE 500 MG/1
500 TABLET, EXTENDED RELEASE ORAL 2 TIMES DAILY WITH MEALS
Qty: 180 TABLET | Refills: 0 | Status: SHIPPED | OUTPATIENT
Start: 2023-12-06 | End: 2024-01-03 | Stop reason: SDUPTHER

## 2023-12-06 NOTE — ASSESSMENT & PLAN NOTE
No history of allergy or hypersensitivity to GLP-1 Agonists. No history or family history of thyroid cancer or multiple endocrine neoplasia syndrome.

## 2023-12-06 NOTE — ASSESSMENT & PLAN NOTE
"Diabetes Management Status    Statin: Not taking  ACE/ARB: Taking    Screening or Prevention Patient's value Goal Complete/Controlled?   HgA1C Testing and Control   Lab Results   Component Value Date    HGBA1C 7.9 (H) 11/28/2023      Annually/Less than 8% Yes   Lipid profile : 11/28/2023 Annually Yes   LDL control Lab Results   Component Value Date    LDLCALC 115.2 11/28/2023    Annually/Less than 100 mg/dl  No   Nephropathy screening No results found for: "LABMICR"  Lab Results   Component Value Date    PROTEINUA Negative 11/28/2023    Annually Yes   Blood pressure BP Readings from Last 1 Encounters:   11/28/23 122/86    Less than 140/90 Yes   Dilated retinal exam Most Recent Eye Exam Date: Not Found Annually Yes   Foot exam   Most Recent Foot Exam Date: Not Found Annually Yes     Lab Results   Component Value Date    HGBA1C 7.9 (H) 11/28/2023    HGBA1C 5.7 (H) 02/20/2023    HGBA1C 5.4 07/13/2018    EGFRNORACEVR >60.0 11/28/2023    LDLCALC 115.2 11/28/2023     No results found for: "GLUTAMICACID", "CPEPTIDE"   Last 5 Patient Entered Readings                                          Most Recent A1c:            No data to display               HEALTH MAINTENANCE: Diabetic health maintenance interventions reviewed and are up to date except for:  There are no preventive care reminders to display for this patient.   .  "

## 2023-12-06 NOTE — ASSESSMENT & PLAN NOTE
BP Readings from Last 6 Encounters:   11/28/23 122/86   11/20/23 130/88   08/11/23 126/88   04/18/23 120/82   03/13/23 118/82   02/20/23 120/84     Lab Results   Component Value Date    EGFRNORACEVR >60.0 11/28/2023    CREATININE 0.8 11/28/2023    BUN 10 11/28/2023    K 4.1 11/28/2023     11/28/2023     11/28/2023     Results for orders placed or performed during the hospital encounter of 11/28/23   SCHEDULED EKG 12-LEAD (to Muse)    Collection Time: 11/28/23  9:16 AM    Narrative    Test Reason : I10,    Vent. Rate : 080 BPM     Atrial Rate : 080 BPM     P-R Int : 156 ms          QRS Dur : 090 ms      QT Int : 364 ms       P-R-T Axes : 037 011 010 degrees     QTc Int : 419 ms    Normal sinus rhythm  Normal ECG  When compared with ECG of 25-FEB-2022 09:01,  No significant change was found  Confirmed by JOHN CREWS MD ([id number]) on 11/28/2023 10:04:24 AM    Referred By: KAREN ANDERSON           Confirmed By:JOHN CREWS MD

## 2023-12-06 NOTE — TELEPHONE ENCOUNTER
----- Message from KAREN Leigh MD sent at 12/6/2023  8:11 AM CST -----  Schedule fasting labs soon.  Schedule VV with me in 3-4 weeks.

## 2023-12-06 NOTE — PROGRESS NOTES
TELEMEDICINE VIRTUAL VIDEO VISIT  12/6/23  7:20 AM CST    Visit Type: Audiovisual    Patient's Location: Adeel represents that they are located within the state of [place].    CHIEF COMPLAINT: Results    We reviewed recent labs remarkable for newly diagnosed type 2 diabetes mellitus. She has had such a dramatic increase in her hemoglobin A1c over the last several months that I question if there may be an underlying precipitating factor. She has had some weight gain, but not sufficient to explain that much increase in her hemoglobin A1c. She denies having been on corticosteroids recently. We discussed risks and benefits of treatment options.    Her hypertension appears well controlled on the current regimen.    Dyslipidemia noted. I recommended a statin. She agreed to discuss this further at her next appointment.    Gastroesophageal reflux disease is controlled off medications.    She requests a colonoscopy instead of Cologuard for colorectal cancer screening.    She is going to enroll in digital medicine programs, begin diabetes treatment with metformin, and schedule a follow-up with me in about three weeks. She likely would be a good candidate for a GLP-1 agonist.        1. Type 2 diabetes mellitus with other specified complication, without long-term current use of insulin  -     metFORMIN (GLUCOPHAGE-XR) 500 MG ER 24hr tablet; Take 1 tablet (500 mg total) by mouth 2 (two) times daily with meals.  Dispense: 180 tablet; Refill: 0  -     Diabetes Digital Medicine (DDMP) Enrollment Order  -     Diabetes Digital Medicine (DDMP): Assign Onboarding Questionnaires  -     Ambulatory referral/consult to Diabetes Education; Future; Expected date: 12/13/2023  -     Microalbumin/Creatinine Ratio, Urine; Future; Expected date: 12/06/2023  -     Glutamic Acid Decarboxylase; Future; Expected date: 12/06/2023  -     Insulin, Random; Future; Expected date: 12/06/2023  -     C-Peptide; Future; Expected date: 12/06/2023    2.  Type 2 diabetes mellitus with morbid obesity  Assessment & Plan:  No history of allergy or hypersensitivity to GLP-1 Agonists. No history or family history of thyroid cancer or multiple endocrine neoplasia syndrome.       3. Hyperlipidemia complicating type 2 diabetes mellitus    4. Hypertension complicating diabetes  Assessment & Plan:  BP Readings from Last 6 Encounters:   11/28/23 122/86   11/20/23 130/88   08/11/23 126/88   04/18/23 120/82   03/13/23 118/82   02/20/23 120/84     Lab Results   Component Value Date    EGFRNORACEVR >60.0 11/28/2023    CREATININE 0.8 11/28/2023    BUN 10 11/28/2023    K 4.1 11/28/2023     11/28/2023     11/28/2023     Results for orders placed or performed during the hospital encounter of 11/28/23   SCHEDULED EKG 12-LEAD (to Muse)    Collection Time: 11/28/23  9:16 AM    Narrative    Test Reason : I10,    Vent. Rate : 080 BPM     Atrial Rate : 080 BPM     P-R Int : 156 ms          QRS Dur : 090 ms      QT Int : 364 ms       P-R-T Axes : 037 011 010 degrees     QTc Int : 419 ms    Normal sinus rhythm  Normal ECG  When compared with ECG of 25-FEB-2022 09:01,  No significant change was found  Confirmed by JOHN CREWS MD ([id number]) on 11/28/2023 10:04:24 AM    Referred By: KAREN ANDERSON           Confirmed By:JOHN CREWS MD        Orders:  -     amlodipine-valsartan (EXFORGE)  mg per tablet; Take 1 tablet by mouth once daily.  Dispense: 90 tablet; Refill: 2  -     Hypertension Digital Medicine (HDMP) Enrollment Order  -     Hypertension Digital Medicine (HDMP): Assign Onboarding Questionnaires    5. Gastro-esophageal reflux disease without esophagitis    6. Screening for colorectal cancer  -     Ambulatory referral/consult to Endo Procedure ; Future; Expected date: 12/07/2023    Unless noted herein, any chronic conditions are represented as and appear stable, and no other significant complaints or concerns were reported.    Review of Systems  "  Constitutional:  Negative for activity change and unexpected weight change.   HENT:  Negative for hearing loss, rhinorrhea and trouble swallowing.    Eyes:  Negative for discharge and visual disturbance.   Respiratory:  Negative for chest tightness, shortness of breath and wheezing.    Cardiovascular:  Negative for chest pain and palpitations.   Gastrointestinal:  Negative for blood in stool, constipation, diarrhea and vomiting.   Endocrine: Negative for polydipsia and polyuria.   Genitourinary:  Negative for difficulty urinating, dysuria, hematuria and menstrual problem.   Musculoskeletal:  Negative for arthralgias, joint swelling and neck pain.   Neurological:  Negative for weakness and headaches.   Psychiatric/Behavioral:  Negative for confusion and dysphoric mood.        Physical Exam  Constitutional:       General: She is not in acute distress.     Appearance: Normal appearance. She is not ill-appearing.   Pulmonary:      Effort: Pulmonary effort is normal. No respiratory distress.   Skin:     Coloration: Skin is not jaundiced.   Neurological:      Mental Status: She is alert. Mental status is at baseline.   Psychiatric:         Mood and Affect: Mood normal.         Behavior: Behavior normal.         Thought Content: Thought content normal.       Follow up in about 3 weeks (around 12/27/2023) for virtual visit, re-evaluation.   Future Appointments   Date Time Provider Department Center   2/12/2024  8:00 AM [id number] [place]   7/1/2024  2:00 PM Shandra Carson MD [facility]       Documentation entered by me for this encounter may have been done in part using speech-recognition technology. Although I have made an effort to ensure accuracy, "sound like" errors may exist and should be interpreted in context.   TOTAL TIME evaluating and managing this patient for this encounter was greater than or equal to 30 minutes. This time was exclusive of any separately billable procedures for " "this patient and exclusive of time spent treating any other patient.    Documentation entered by me for this encounter may have been done in part using speech-recognition technology. Although I have made an effort to ensure accuracy, "sound like" errors may exist and should be interpreted in context.    Each patient to whom medical services are provided by telemedicine is: (1) informed of the relationship between the physician and patient and the respective role of any other health care provider with respect to management of the patient; and (2) notified that he or she may decline to receive medical services by telemedicine and may withdraw from such care at any time.  "

## 2023-12-07 ENCOUNTER — LAB VISIT (OUTPATIENT)
Dept: LAB | Facility: HOSPITAL | Age: 48
End: 2023-12-07
Attending: FAMILY MEDICINE
Payer: MEDICAID

## 2023-12-07 DIAGNOSIS — E11.69 TYPE 2 DIABETES MELLITUS WITH OTHER SPECIFIED COMPLICATION, WITHOUT LONG-TERM CURRENT USE OF INSULIN: ICD-10-CM

## 2023-12-07 LAB
ALBUMIN/CREAT UR: 229.5 UG/MG (ref 0–30)
C PEPTIDE SERPL-MCNC: 5.19 NG/ML (ref 0.78–5.19)
CREAT UR-MCNC: 217 MG/DL (ref 15–325)
INSULIN COLLECTION INTERVAL: ABNORMAL
INSULIN SERPL-ACNC: 29.8 UU/ML
MICROALBUMIN UR DL<=1MG/L-MCNC: 498 UG/ML

## 2023-12-07 PROCEDURE — 86341 ISLET CELL ANTIBODY: CPT | Performed by: FAMILY MEDICINE

## 2023-12-07 PROCEDURE — 83525 ASSAY OF INSULIN: CPT | Performed by: FAMILY MEDICINE

## 2023-12-07 PROCEDURE — 82043 UR ALBUMIN QUANTITATIVE: CPT | Performed by: FAMILY MEDICINE

## 2023-12-07 PROCEDURE — 84681 ASSAY OF C-PEPTIDE: CPT | Performed by: FAMILY MEDICINE

## 2023-12-07 PROCEDURE — 36415 COLL VENOUS BLD VENIPUNCTURE: CPT | Performed by: FAMILY MEDICINE

## 2023-12-08 ENCOUNTER — HOSPITAL ENCOUNTER (OUTPATIENT)
Dept: PREADMISSION TESTING | Facility: HOSPITAL | Age: 48
Discharge: HOME | End: 2023-12-08
Attending: FAMILY MEDICINE
Payer: MEDICAID

## 2023-12-08 DIAGNOSIS — Z12.11 SCREENING FOR COLORECTAL CANCER: Primary | ICD-10-CM

## 2023-12-08 DIAGNOSIS — Z12.12 SCREENING FOR COLORECTAL CANCER: Primary | ICD-10-CM

## 2023-12-11 LAB — GAD65 AB SER-SCNC: 0 NMOL/L

## 2023-12-11 RX ORDER — SODIUM, POTASSIUM,MAG SULFATES 17.5-3.13G
1 SOLUTION, RECONSTITUTED, ORAL ORAL DAILY
Qty: 1 KIT | Refills: 0 | Status: SHIPPED | OUTPATIENT
Start: 2023-12-11 | End: 2023-12-13

## 2023-12-16 NOTE — TELEPHONE ENCOUNTER
"Rx provided after BP check:     amlodipine-valsartan (EXFORGE)  mg per tablet, Take 1 tablet by mouth once daily., Disp: 90 tablet, Rfl: 2  --------------------------------------------------------------------------------     In response to: "Pt asking about steps to take to see specialist to put her to sleep to complete a colonoscopy. Pt states she does not want to do at home with kit."    I'm interpreting this to mean she wants to do colonoscopy instead of Cologuard as previously agreed.    I have ordered colonoscopy as requested.    Orders Placed This Encounter   Procedures    Ambulatory referral/consult to Endo Procedure       TO MY TEAM:   Please contact Adeel and relay message below.  --------------------------------------------------------------------------------   Colon cancer screening saves lives, so I've entered an order for you to have a colonoscopy as you requested.    Before you can have your colonoscopy, you must schedule a telephone appointment for Pre-Admit Testing ("P.A.T."). During the P.A.T. phone appointment, one of our endoscopy nurses will review your medical history with you and tell you if any additional steps need to be taken to help your test go smoothly. The nurse will give you instructions on preparing for the colonoscopy and let you know what to expect. They will also answer any questions you may have about the test.    Someone from [facility] should be calling you soon to help schedule your P.A.T. phone appointment. If you haven't been contacted within the next 3 business days, you can schedule your P.A.T. phone appointment from your [facility] account or by calling our appointment desk at [phone].  "

## 2023-12-18 ENCOUNTER — CLINICAL SUPPORT (OUTPATIENT)
Dept: DIABETES | Facility: CLINIC | Age: 48
End: 2023-12-18
Payer: MEDICAID

## 2023-12-18 DIAGNOSIS — E11.69 TYPE 2 DIABETES MELLITUS WITH OTHER SPECIFIED COMPLICATION, WITHOUT LONG-TERM CURRENT USE OF INSULIN: ICD-10-CM

## 2023-12-18 DIAGNOSIS — E11.69 TYPE 2 DIABETES MELLITUS WITH OTHER SPECIFIED COMPLICATION, WITHOUT LONG-TERM CURRENT USE OF INSULIN: Primary | Chronic | ICD-10-CM

## 2023-12-18 PROCEDURE — 99212 OFFICE O/P EST SF 10 MIN: CPT | Mod: PBBFAC | Performed by: DIETITIAN, REGISTERED

## 2023-12-18 PROCEDURE — G0108 DIAB MANAGE TRN  PER INDIV: HCPCS | Mod: PBBFAC | Performed by: DIETITIAN, REGISTERED

## 2023-12-18 PROCEDURE — 99999PBSHW PR PBB SHADOW TECHNICAL ONLY FILED TO HB: Mod: PBBFAC,,,

## 2023-12-18 PROCEDURE — 99999 PR PBB SHADOW E&M-EST. PATIENT-LVL II: ICD-10-PCS | Mod: PBBFAC,,, | Performed by: DIETITIAN, REGISTERED

## 2023-12-18 PROCEDURE — 99999PBSHW PR PBB SHADOW TECHNICAL ONLY FILED TO HB: ICD-10-PCS | Mod: PBBFAC,,,

## 2023-12-18 PROCEDURE — 99999 PR PBB SHADOW E&M-EST. PATIENT-LVL II: CPT | Mod: PBBFAC,,, | Performed by: DIETITIAN, REGISTERED

## 2023-12-18 RX ORDER — INSULIN PUMP SYRINGE, 3 ML
EACH MISCELLANEOUS
Qty: 1 EACH | Refills: 0 | Status: SHIPPED | OUTPATIENT
Start: 2023-12-18

## 2023-12-18 RX ORDER — LANCETS
EACH MISCELLANEOUS
Qty: 100 EACH | Refills: 11 | Status: SHIPPED | OUTPATIENT
Start: 2023-12-18

## 2023-12-18 NOTE — PROGRESS NOTES
Diabetes Care Specialist Progress Note  Author: Kerry Black RD, CDE  Date: 12/19/2023    Program Intake  Reason for Diabetes Program Visit:: Initial Diabetes Assessment  Current diabetes risk level:: moderate  In the last 12 months, have you:: none  Permission to speak with others about care:: no    Patient is newly diagnosed with Type 2 diabetes.     Lab Results   Component Value Date    HGBA1C 7.9 (H) 11/28/2023       Clinical      Problem Review  Reviewed Problem List with Patient: yes  Active comorbidities affecting diabetes self-care.: no    Clinical Assessment  Current Diabetes Treatment: Diet, Oral Medication  Have you ever experienced hypoglycemia (low blood sugar)?: no  Have you ever experienced hyperglycemia (high blood sugar)?: no    Medication Information  How do you obtain your medications?: Patient drives  How many days a week do you miss your medications?: Never  Do you sometimes have difficulty refilling your medications?: No  Medication adherence impacting ability to self-manage diabetes?: No    Labs  Do you have regular lab work to monitor your medications?: Yes  Type of Regular Lab Work: A1c  [facility]  Lab Compliance Barriers: No    Nutritional Status  Diet: Regular  Meal Plan 24 Hour Recall: Breakfast, Lunch, Dinner, Snack  Meal Plan 24 Hour Recall - Breakfast: grits, salami, 1/2 cinnamon, water  Meal Plan 24 Hour Recall - Lunch: [place]: salad, fish, fried shrimp, meatloaf, mashed potatoes, ribs, arnold paulino  Meal Plan 24 Hour Recall - Dinner: nothing  Meal Plan 24 Hour Recall - Snack: chip ahoy cookies  Change in appetite?: No  Dentation:: Intact  Recent Changes in Weight: No Recent Weight Change  Current nutritional status an area of need that is impacting patient's ability to self-manage diabetes?: No    Additional Social History    Support  Does anyone support you with your diabetes care?: yes  Who supports you?: self  Who takes you to your  medical appointments?: self  Does the current support meet the patient's needs?: Yes  Is Support an area impacting ability to self-manage diabetes?: No    Access to Mass Media & Technology  Does the patient have access to any of the following devices or technologies?: Smart phone  Media or technology needs impacting ability to self-manage diabetes?: No    Cognitive/Behavioral Health  Alert and Oriented: Yes  Difficulty Thinking: No  Requires Prompting: No  Requires assistance for routine expression?: No  Cognitive or behavioral barriers impacting ability to self-manage diabetes?: No    Culture/Sabianism  Culture or Episcopal beliefs that may impact ability to access healthcare: No    Communication  Language preference: English  Hearing Problems: No  Vision Problems: No  Communication needs impacting ability to self-manage diabetes?: No    Health Literacy  Preferred Learning Method: Face to Face  How often do you need to have someone help you read instructions, pamphlets, or written material from your doctor or pharmacy?: Never  Health literacy needs impacting ability to self-manage diabetes?: No      Diabetes Self-Management Skills Assessment    Diabetes Disease Process/Treatment Options  Patient/caregiver able to state what happens when someone has diabetes.: no  Patient/caregiver knows what type of diabetes they have.: yes  Diabetes Type : Type II  Patient/caregiver able to identify at least three signs and symptoms of diabetes.: no  Patient able to identify at least three risk factors for diabetes.: no  Diabetes Disease Process/Treatment Options: Skills Assessment Completed: Yes  Assessment indicates:: Instruction Needed  Area of need?: No    Nutrition/Healthy Eating  Challenges to healthy eating:: portion control  Method of carbohydrate measurement:: no method  Patient can identify foods that impact blood sugar.: yes  Patient-identified foods:: sweets  Nutrition/Healthy Eating Skills Assessment Completed::  Yes  Assessment indicates:: Instruction Needed, Knowledge deficit  Area of need?: Yes    Physical Activity/Exercise  Patient's daily activity level:: sedentary  Patient formally exercises outside of work.: no  Physical Activity/Exercise Skills Assessment Completed: : Yes  Assessment indicates:: Instruction Needed  Area of need?: Yes    Medications  Patient is able to describe current diabetes management routine.: yes  Diabetes management routine:: oral medications  Patient is able to identify current diabetes medications, dosages, and appropriate timing of medications.: yes (Metformin XR 500mg BID)  Patient understands the purpose of the medications taken for diabetes.: no  Patient reports problems or concerns with current medication regimen.: no  Medication Skills Assessment Completed:: Yes  Assessment indicates:: Instruction Needed  Area of need?: No    Home Blood Glucose Monitoring  Patient states that blood sugar is checked at home daily.: no  Reasons for not monitoring:: new diabetes diagnosis  Home Blood Glucose Monitoring Skills Assessment Completed: : Yes  Assessment indicates:: Instruction Needed  Area of need?: Yes    Acute Complications  Patient is able to identify types of acute complications: No  Acute Complications Skills Assessment Completed: : Yes  Assessment indicates:: Instruction Needed  Area of need?: No    Chronic Complications  Patient can identify major chronic complications of diabetes.: no  Patient can identify ways to prevent or delay diabetes complications.: no  Patient is aware that having diabetes increases risk of heart disease?: No  Patient is taking statin?: No  Chronic Complications Skills Assessment Completed: : Yes  Assessment indicates:: Instruction Needed  Area of need?: No        Assessment Summary and Plan    Based on today's diabetes care assessment, the following areas of need were identified:          12/18/2023    12:02 AM   Social   Support No   Access to [employer]/Tech  No   Cognitive/Behavioral Health No   Culture/Restorationist No   Communication No   Health Literacy No            12/18/2023    12:02 AM   Clinical   Medication Adherence No   Lab Compliance No   Nutritional Status No            12/18/2023    12:02 AM   Diabetes Self-Management Skills   Diabetes Disease Process/Treatment Options Reviewed pathophysiology of type 2 diabetes, complications related to the disease, importance of annual dilated eye exam, and daily foot exam.     Nutrition/Healthy Eating Yes- see care plan.    Physical Activity/Exercise Yes- see care plan.    Medication Discussed MOA, onset, side effects, dosage of metformin.    Home Blood Glucose Monitoring Yes- see care plan.    Acute Complications Reviewed blood glucose goals, prevention, detection, signs and symptoms, and treatment of hypoglycemia and hyperglycemia, and when to contact the clinic.     Chronic Complications Discussed importance of A1c less than 7 to reduce risk of micro and macro complications, including nephropathy, neuropathy, retinopathy, heart attack and stroke. Reviewed the importance of controlled Blood Pressure and Cholesterol Lab Values in preventing disease.   Health maintenance reviewed            Today's interventions were provided through individual discussion, instruction, and written materials were provided.      Patient verbalized understanding of instruction and written materials.  Pt was able to return back demonstration of instructions today. Patient understood key points, needs reinforcement and further instruction.     Diabetes Self-Management Care Plan:    Today's Diabetes Self-Management Care Plan was developed with Adeel's input. Randychad has agreed to work toward the following goal(s) to improve his/her overall diabetes control.      Care Plan: Diabetes Management   Updates made since 11/19/2023 12:00 AM        Problem: Healthy Eating         Goal: Eat 3 meals daily with 30-45g/2-3 servings of Carbohydrate per  meal.    Start Date: 12/19/2023   Expected End Date: 3/19/2024   Priority: High   Barriers: No Barriers Identified        Task: Reviewed the sources and role of Carbohydrate, Protein, and Fat and how each nutrient impacts blood sugar. Completed 12/19/2023        Task: Provided visual examples using dry measuring cups, food models, and other familiar objects such as computer mouse, deck or cards, tennis ball etc. to help with visualization of portions. Completed 12/19/2023        Task: Review the importance of balancing carbohydrates with each meal using portion control techniques to count servings of carbohydrate and label reading to identify serving size and amount of total carbs per serving. Completed 12/19/2023        Task: Provided Sample plate method and reviewed the use of the plate to estimate amounts of carbohydrate per meal. Completed 12/19/2023        Problem: Blood Glucose Self-Monitoring         Goal: Patient will start checking BG at least one time per day and as needed.    Start Date: 12/19/2023   Expected End Date: 3/19/2024   Priority: Medium   Barriers: No Barriers Identified        Task: Sent request to PCP for insurance approved meter and strips. Completed 12/19/2023        Task: Reviewed the importance of self-monitoring blood glucose and keeping logs. Completed 12/19/2023        Task: Provided patient with blood glucose logs, reviewed appropriate timing and frequency to SMBG, education on parameters on when to notify provider and advised patient to bring logs to all appts with PCP/Endocrinologist/Diabetes Care Specialist. Completed 12/19/2023        Task: Discussed ways to minimize pain when monitoring blood glucose. Completed 12/19/2023        Problem: Physical Activity and Exercise         Goal: Patient agrees to increase physical activity to a goal of 5 times per week for 30 minutes as tolerated. .    Start Date: 12/19/2023   Expected End Date: 3/19/2024   Priority: Low   Barriers: No  Barriers Identified        Task: Discussed role of physical activity on reducing insulin resistance and improvement in overall glycemic control. Completed 12/19/2023        Task: Discussed role of physical activity as it relates to weight loss Completed 12/19/2023        Task: Offered suggestions on how patient could increase their regular physical activity Completed 12/19/2023          Follow Up Plan     Follow up in about 17 days (around 1/4/2024) for E11.69.    Today's care plan and follow up schedule was discussed with patient.  Adeel verbalized understanding of the care plan, goals, and agrees to follow up plan.        The patient was encouraged to communicate with his/her health care provider/physician and care team regarding his/her condition(s) and treatment.  I provided the patient with my contact information today and encouraged to contact me via phone or [facility] Patient Portal as needed.     Length of Visit   Total Time: 60 Minutes

## 2023-12-18 NOTE — PROGRESS NOTES
Results to be addressed at upcoming appointment(s) already scheduled.  Future Appointments  1/3/2024   7:20 AM    KAREN Leigh MD      [facility]  1/4/2024   2:00 PM    Kerry Black RD, CDE   [facility] DIBEDU         [facility]  2/12/2024  8:00 AM    [facility] [id number]             [facility] MAMMO          [facility]  7/1/2024   2:00 PM    Shandra Carson MD     [facility]

## 2023-12-20 ENCOUNTER — HOSPITAL ENCOUNTER (OUTPATIENT)
Facility: HOSPITAL | Age: 48
Discharge: HOME | End: 2023-12-20
Attending: COLON & RECTAL SURGERY | Admitting: COLON & RECTAL SURGERY
Payer: MEDICAID

## 2023-12-20 ENCOUNTER — ANESTHESIA (OUTPATIENT)
Dept: ENDOSCOPY | Facility: HOSPITAL | Age: 48
End: 2023-12-20
Payer: MEDICAID

## 2023-12-20 ENCOUNTER — ANESTHESIA EVENT (OUTPATIENT)
Dept: ENDOSCOPY | Facility: HOSPITAL | Age: 48
End: 2023-12-20
Payer: MEDICAID

## 2023-12-20 DIAGNOSIS — Z12.11 SCREENING FOR COLON CANCER: ICD-10-CM

## 2023-12-20 LAB
B-HCG UR QL: NEGATIVE
CTP QC/QA: YES
POCT GLUCOSE: 112 MG/DL (ref 70–110)

## 2023-12-20 PROCEDURE — 88305 TISSUE EXAM BY PATHOLOGIST: ICD-10-PCS | Mod: 26,,, | Performed by: PATHOLOGY

## 2023-12-20 PROCEDURE — 88305 TISSUE EXAM BY PATHOLOGIST: CPT | Mod: 26,,, | Performed by: PATHOLOGY

## 2023-12-20 PROCEDURE — 27201012 HC FORCEPS, HOT/COLD, DISP: Performed by: COLON & RECTAL SURGERY

## 2023-12-20 PROCEDURE — 45380 COLONOSCOPY AND BIOPSY: CPT | Mod: 59 | Performed by: COLON & RECTAL SURGERY

## 2023-12-20 PROCEDURE — 27201089 HC SNARE, DISP (ANY): Performed by: COLON & RECTAL SURGERY

## 2023-12-20 PROCEDURE — 45385 PR COLONOSCOPY,REMV LESN,SNARE: ICD-10-PCS | Mod: ,,, | Performed by: COLON & RECTAL SURGERY

## 2023-12-20 PROCEDURE — 37000008 HC ANESTHESIA 1ST 15 MINUTES: Performed by: COLON & RECTAL SURGERY

## 2023-12-20 PROCEDURE — 25000003 PHARM REV CODE 250: Performed by: COLON & RECTAL SURGERY

## 2023-12-20 PROCEDURE — 45385 COLONOSCOPY W/LESION REMOVAL: CPT | Mod: ,,, | Performed by: COLON & RECTAL SURGERY

## 2023-12-20 PROCEDURE — 45380 PR COLONOSCOPY,BIOPSY: ICD-10-PCS | Mod: 59,,, | Performed by: COLON & RECTAL SURGERY

## 2023-12-20 PROCEDURE — 45385 COLONOSCOPY W/LESION REMOVAL: CPT | Performed by: COLON & RECTAL SURGERY

## 2023-12-20 PROCEDURE — 45380 COLONOSCOPY AND BIOPSY: CPT | Mod: 59,,, | Performed by: COLON & RECTAL SURGERY

## 2023-12-20 PROCEDURE — 81025 URINE PREGNANCY TEST: CPT | Performed by: COLON & RECTAL SURGERY

## 2023-12-20 PROCEDURE — 37000009 HC ANESTHESIA EA ADD 15 MINS: Performed by: COLON & RECTAL SURGERY

## 2023-12-20 PROCEDURE — 25000003 PHARM REV CODE 250: Performed by: NURSE ANESTHETIST, CERTIFIED REGISTERED

## 2023-12-20 PROCEDURE — 88305 TISSUE EXAM BY PATHOLOGIST: CPT | Mod: 59 | Performed by: PATHOLOGY

## 2023-12-20 PROCEDURE — 63600175 PHARM REV CODE 636 W HCPCS: Performed by: NURSE ANESTHETIST, CERTIFIED REGISTERED

## 2023-12-20 RX ORDER — DEXTROMETHORPHAN/PSEUDOEPHED 2.5-7.5/.8
DROPS ORAL
Status: DISCONTINUED | OUTPATIENT
Start: 2023-12-20 | End: 2023-12-20 | Stop reason: HOSPADM

## 2023-12-20 RX ORDER — SODIUM CHLORIDE, SODIUM LACTATE, POTASSIUM CHLORIDE, CALCIUM CHLORIDE 600; 310; 30; 20 MG/100ML; MG/100ML; MG/100ML; MG/100ML
INJECTION, SOLUTION INTRAVENOUS CONTINUOUS PRN
Status: DISCONTINUED | OUTPATIENT
Start: 2023-12-20 | End: 2023-12-20

## 2023-12-20 RX ORDER — PROPOFOL 10 MG/ML
VIAL (ML) INTRAVENOUS
Status: DISCONTINUED | OUTPATIENT
Start: 2023-12-20 | End: 2023-12-20

## 2023-12-20 RX ORDER — LIDOCAINE HYDROCHLORIDE 10 MG/ML
INJECTION, SOLUTION EPIDURAL; INFILTRATION; INTRACAUDAL; PERINEURAL
Status: DISCONTINUED | OUTPATIENT
Start: 2023-12-20 | End: 2023-12-20

## 2023-12-20 RX ADMIN — PROPOFOL 50 MG: 10 INJECTION, EMULSION INTRAVENOUS at 12:12

## 2023-12-20 RX ADMIN — LIDOCAINE HYDROCHLORIDE 50 MG: 10 SOLUTION INTRAVENOUS at 12:12

## 2023-12-20 RX ADMIN — PROPOFOL 50 MG: 10 INJECTION, EMULSION INTRAVENOUS at 01:12

## 2023-12-20 RX ADMIN — SODIUM CHLORIDE, SODIUM LACTATE, POTASSIUM CHLORIDE, AND CALCIUM CHLORIDE: 600; 310; 30; 20 INJECTION, SOLUTION INTRAVENOUS at 12:12

## 2023-12-20 NOTE — ANESTHESIA POSTPROCEDURE EVALUATION
Anesthesia Post Evaluation    Patient: Adeel Gordon    Procedure(s) Performed: Procedure(s) (LRB):  COLONOSCOPY (N/A)    Final Anesthesia Type: MAC      Patient location during evaluation: GI PACU  Patient participation: Yes- Able to Participate  Level of consciousness: awake and alert  Post-procedure vital signs: reviewed and stable  Pain management: adequate  Airway patency: patent    PONV status at discharge: No PONV  Anesthetic complications: no      Cardiovascular status: blood pressure returned to baseline, hemodynamically stable and stable  Respiratory status: unassisted, room air and spontaneous ventilation  Hydration status: euvolemic  Follow-up not needed.              Vitals Value Taken Time   /81 12/20/23 1332   Temp 36.7 °C (98.1 °F) 12/20/23 1312   Pulse 73 12/20/23 1332   Resp 18 12/20/23 1332   SpO2 97 % 12/20/23 1332         Event Time   Out of Recovery 13:36:31         Pain/Stefan Score: No data recorded

## 2023-12-20 NOTE — TRANSFER OF CARE
"Anesthesia Transfer of Care Note    Patient: Adeel Gordon    Procedure(s) Performed: Procedure(s) (LRB):  COLONOSCOPY (N/A)    Patient location: GI    Anesthesia Type: MAC    Transport from OR: Transported from OR on room air with adequate spontaneous ventilation    Post pain: adequate analgesia    Post assessment: no apparent anesthetic complications and tolerated procedure well    Post vital signs: stable    Level of consciousness: responds to stimulation    Nausea/Vomiting: no nausea/vomiting    Complications: none    Transfer of care protocol was followed      Last vitals: Visit Vitals  /78 (BP Location: Left arm, Patient Position: Lying)   Pulse 94   Temp 36.7 °C (98.1 °F) (Temporal)   Resp 18   Ht 5' 5" (1.651 m)   Wt 114.8 kg (253 lb)   LMP 11/25/2023 (Approximate)   SpO2 98%   BMI 42.10 kg/m²     "

## 2023-12-20 NOTE — BRIEF OP NOTE
[facility])  Brief Operative Note     SUMMARY     Surgery Date: 12/20/2023     Surgeon(s) and Role:     * Agnes Albert MD - Primary    Assisting Surgeon: None    Pre-op Diagnosis:  Screening for colorectal cancer [Z12.11, Z12.12]    Post-op Diagnosis:  Post-Op Diagnosis Codes:     * Screening for colorectal cancer [Z12.11, Z12.12]    Procedure(s) (LRB):  COLONOSCOPY (N/A)    Anesthesia: Choice    Description of the findings of the procedure: polyps removed    Estimated Blood Loss: * No values recorded between 12/20/2023 12:45 PM and 12/20/2023  1:10 PM *         Specimens:   Specimen (24h ago, onward)       Start     Ordered    12/20/23 1301  Specimen to Pathology, Surgery Gastrointestinal tract  Once        Comments: Pre-op Diagnosis: Screening for colorectal cancer [Z12.11, Z12.12]Procedure(s):COLONOSCOPY Name of specimens: 1. Cecal polypectomy 2. Descending colon polypectomy3. Sigmoid polypectomy     References:    Click here for ordering Quick Tip   Question Answer Comment   Procedure Type: Gastrointestinal tract    Specimen Class: Routine/Screening    Which provider would you like to cc? AGNES ALBERT    Release to patient Immediate        12/20/23 1308                    Discharge Note    SUMMARY     Admit Date: 12/20/2023    Discharge Date and Time: 12/20/2023 1:31 PM    Hospital Course Patient was seen in the preoperative area by both myself and anesthesia. All consents were verified and all questions appropriately answered. All risks, benefits and alternatives explained to patient. Patient proceeded to endoscopy suite for colonoscopy and was discharged home postoperative once cleared by anesthesia.    Final Diagnosis: Post-Op Diagnosis Codes:     * Screening for colorectal cancer [Z12.11, Z12.12]    Disposition: Home or Self Care    Follow Up/Patient Instructions: See Provation report    Medications:  Reconciled Home Medications:      Medication List        CONTINUE taking  these medications      amlodipine-valsartan  mg per tablet  Commonly known as: EXFORGE  Take 1 tablet by mouth once daily.     blood sugar diagnostic Strp  Commonly known as: BLOOD GLUCOSE TEST  Check blood glucose 1-2 times daily as directed and as needed for symptoms of low or high blood sugar     blood-glucose meter kit  Use as instructed     lancets Misc  Check blood glucose 1-2 times daily as directed and as needed for symptoms of low or high blood sugar     metFORMIN 500 MG ER 24hr tablet  Commonly known as: GLUCOPHAGE-XR  Take 1 tablet (500 mg total) by mouth 2 (two) times daily with meals.            ASK your doctor about these medications      famotidine 20 MG tablet  Commonly known as: PEPCID  TAKE 1 TABLET(20 MG) BY MOUTH TWICE DAILY            Discharge Procedure Orders   Diet general     Call MD for:  temperature >100.4     Call MD for:  persistent nausea and vomiting     Call MD for:  severe uncontrolled pain     Call MD for:  difficulty breathing, headache or visual disturbances     Call MD for:  redness, tenderness, or signs of infection (pain, swelling, redness, odor or green/yellow discharge around incision site)     Call MD for:  hives     Call MD for:  persistent dizziness or light-headedness     Call MD for:  extreme fatigue     Activity as tolerated      Follow-up Information       KAREN Leigh MD Follow up.    Specialty: Family Medicine  Why: As needed  Contact information:  [address]  [place] [address] [phone]

## 2023-12-20 NOTE — H&P
[facility] ([facility])  Colon and Rectal Surgery  History & Physical    Patient Name: Adeel Gordon  MRN: [id number]  Admission Date: 12/20/2023  Attending Physician: Michael Albert MD  Primary Care Provider: KAREN Leigh MD    Patient information was obtained from patient and medical records.    Subjective:     Chief Complaint/Reason for Admission: Here for Colonoscopy    History of Present Illness:  Patient is a 48 y.o. female presents for colonoscopy. Never had colonoscopy. No hematochezia, melena or change in bowel habits. No personal or fam hx of CRC, polyps or IBD.    No current facility-administered medications on file prior to encounter.     Current Outpatient Medications on File Prior to Encounter   Medication Sig    amlodipine-valsartan (EXFORGE)  mg per tablet Take 1 tablet by mouth once daily.    metFORMIN (GLUCOPHAGE-XR) 500 MG ER 24hr tablet Take 1 tablet (500 mg total) by mouth 2 (two) times daily with meals.    famotidine (PEPCID) 20 MG tablet TAKE 1 TABLET(20 MG) BY MOUTH TWICE DAILY (Patient not taking: Reported on 12/14/2023)       Review of patient's allergies indicates:  No Known Allergies    Past Medical History:   Diagnosis Date    Benign essential hypertension 01/30/2014    Class 1 obesity due to excess calories with serious comorbidity and body mass index (BMI) of 34.0 to 34.9 in adult 12/03/2015    Class 2 severe obesity due to excess calories with serious comorbidity and body mass index (BMI) of 38.0 to 38.9 in adult 12/03/2015    Depression     not taking meds     Diabetes mellitus     Generalized anxiety disorder 03/28/2022    History of endometrial ablation 07/2017    Hypertension     Iron deficiency anemia due to chronic blood loss 01/06/2017    Obesity     S/P tubal ligation 07/2017    Severe obesity with body mass index (BMI) of 36.0 to 36.9 with serious comorbidity 12/03/2015    Tobacco use disorder      Past Surgical History:   Procedure Laterality Date     ENDOMETRIAL ABLATION  02/28/2017    TUBAL LIGATION  02/28/2017    VAGINAL DELIVERY      x 3     Family History       Problem Relation (Age of Onset)    Cancer Father    Hypertension Son, Father, Mother    Kidney disease Son    No Known Problems Sister, Sister, Brother          Tobacco Use    Smoking status: Former     Current packs/day: 0.20     Average packs/day: 0.2 packs/day for 20.0 years (4.0 ttl pk-yrs)     Types: Vaping with nicotine, Cigarettes     Quit date: 2022    Smokeless tobacco: Never    Tobacco comments:     Vape pen >10 x / day   Substance and Sexual Activity    Alcohol use: No     Alcohol/week: 0.0 standard drinks of alcohol    Drug use: No    Sexual activity: Yes     Partners: Male     Birth control/protection: None     Review of Systems   Constitutional:  Negative for activity change, appetite change, chills, fatigue, fever and unexpected weight change.   HENT:  Negative for congestion, ear pain, sore throat and trouble swallowing.    Eyes:  Negative for pain, redness and itching.   Respiratory:  Negative for cough, shortness of breath and wheezing.    Cardiovascular:  Negative for chest pain, palpitations and leg swelling.   Gastrointestinal:  Negative for abdominal distention, abdominal pain, anal bleeding, blood in stool, constipation, diarrhea, nausea, rectal pain and vomiting.   Endocrine: Negative for cold intolerance, heat intolerance and polyuria.   Genitourinary:  Negative for dysuria, flank pain, frequency and hematuria.   Musculoskeletal:  Negative for gait problem, joint swelling and neck pain.   Skin:  Negative for color change, rash and wound.   Allergic/Immunologic: Negative for environmental allergies and immunocompromised state.   Neurological:  Negative for dizziness, speech difficulty, weakness and numbness.   Psychiatric/Behavioral:  Negative for agitation, confusion and hallucinations.      Objective:     Vital Signs (Most Recent):  Temp: 98.1 °F (36.7 °C) (12/20/23  1201)  Pulse: 84 (12/20/23 1201)  Resp: 18 (12/20/23 1201)  BP: (!) 133/93 (12/20/23 1201)  SpO2: 98 % (12/20/23 1201) Vital Signs (24h Range):  Temp:  [98.1 °F (36.7 °C)] 98.1 °F (36.7 °C)  Pulse:  [84] 84  Resp:  [18] 18  SpO2:  [98 %] 98 %  BP: (133)/(93) 133/93     Weight: 114.8 kg (253 lb)  Body mass index is 42.1 kg/m².    Physical Exam  Constitutional:       Appearance: She is well-developed.   HENT:      Head: Normocephalic and atraumatic.   Eyes:      Conjunctiva/sclera: Conjunctivae normal.   Neck:      Thyroid: No thyromegaly.   Cardiovascular:      Rate and Rhythm: Normal rate and regular rhythm.   Pulmonary:      Effort: Pulmonary effort is normal. No respiratory distress.   Abdominal:      General: There is no distension.      Palpations: Abdomen is soft. There is no mass.      Tenderness: There is no abdominal tenderness.   Musculoskeletal:         General: No tenderness. Normal range of motion.      Cervical back: Normal range of motion.   Skin:     General: Skin is warm and dry.      Capillary Refill: Capillary refill takes less than 2 seconds.      Findings: No rash.   Neurological:      Mental Status: She is alert and oriented to person, place, and time.           Assessment/Plan:     Patient is a 48 y.o. female who presents for colonoscopy     - Ok to proceed to endoscopy suite for colonoscopy  - Consent obtained. All risks, benefits and alternatives fully explained to patient, including but not limited to bleeding, infection, perforation, and missed polyps. All questions appropriately answered to patient's satisfaction. Consent signed and placed on chart.    There are no hospital problems to display for this patient.    VTE Risk Mitigation (From admission, onward)      None            Michael Albert MD  Colon and Rectal Surgery  [facility] ([facility])

## 2023-12-20 NOTE — [employer] PATIENT INSTRUCTIONS
Discharge Summary/Instructions after an Endoscopic Procedure  Patient Name: Adeel Gordon  Patient MRN: [id number]  Patient YOB: 1975 Wednesday, December 20, 2023 Michael Albert MD  Dear patient,  As a result of recent federal legislation (The Federal Cures Act), you may   receive lab or pathology results from your procedure in your [facility]   account before your physician is able to contact you. Your physician or   their representative will relay the results to you with their   recommendations at their soonest availability.  Thank you,  RESTRICTIONS:  During your procedure today, you received medications for sedation.  These   medications may affect your judgment, balance and coordination.  Therefore,   for 24 hours, you have the following restrictions:   - DO NOT drive a car, operate machinery, make legal/financial decisions,   sign important papers or drink alcohol.    ACTIVITY:  Today: no heavy lifting, straining or running due to procedural   sedation/anesthesia.  The following day: return to full activity including work.  DIET:  Eat and drink normally unless instructed otherwise.     TREATMENT FOR COMMON SIDE EFFECTS:  - Mild abdominal pain, nausea, belching, bloating or excessive gas:  rest,   eat lightly and use a heating pad.  - Sore Throat: treat with throat lozenges and/or gargle with warm salt   water.  - Because air was used during the procedure, expelling large amounts of air   from your rectum or belching is normal.  - If a bowel prep was taken, you may not have a bowel movement for 1-3 days.    This is normal.  SYMPTOMS TO WATCH FOR AND REPORT TO YOUR PHYSICIAN:  1. Abdominal pain or bloating, other than gas cramps.  2. Chest pain.  3. Back pain.  4. Signs of infection such as: chills or fever occurring within 24 hours   after the procedure.  5. Rectal bleeding, which would show as bright red, maroon, or black stools.   (A tablespoon of blood from the rectum is not serious,  especially if   hemorrhoids are present.)  6. Vomiting.  7. Weakness or dizziness.  GO DIRECTLY TO THE NEAREST EMERGENCY ROOM IF YOU HAVE ANY OF THE FOLLOWING:      Difficulty breathing              Chills and/or fever over 101 F   Persistent vomiting and/or vomiting blood   Severe abdominal pain   Severe chest pain   Black, tarry stools   Bleeding- more than one tablespoon   Any other symptom or condition that you feel may need urgent attention  Your doctor recommends these additional instructions:  If any biopsies were taken, your doctors clinic will contact you in 1 to 2   weeks with any results.  - Discharge patient to home.   - High fiber diet.   - Continue present medications.   - Await pathology results.   - Repeat colonoscopy in 5 years for surveillance.   - Return to primary care physician PRN.  For questions, problems or results please call your physician Michael Albert MD at Work:  [phone]  If you have any questions about the above instructions, call the GI   department at [phone] or call the endoscopy unit at [phone]   from 7am until 3 pm.  [facility] [place], EMERGENCY ROOM PHONE NUMBER:   [phone]  IF A COMPLICATION OR EMERGENCY SITUATION ARISES AND YOU ARE UNABLE TO REACH   YOUR PHYSICIAN - GO DIRECTLY TO THE EMERGENCY ROOM.  I have read or have had read to me these discharge instructions for my   procedure and have received a written copy.  I understand these   instructions and will follow-up with my physician if I have any questions.     __________________________________       _____________________________________  Nurse Signature                                          Patient/Designated   Responsible Party Signature  MD Michael Higgins MD  12/20/2023 1:12:36 PM  This report has been verified and signed electronically.  Dear patient,  As a result of recent federal legislation (The Federal Cures Act), you may   receive lab or  pathology results from your procedure in your [facility]   account before your physician is able to contact you. Your physician or   their representative will relay the results to you with their   recommendations at their soonest availability.  Thank you,  [employer]ATION

## 2023-12-20 NOTE — ANESTHESIA PREPROCEDURE EVALUATION
12/20/2023  Adeel Gordon is a 48 y.o., female.    Patient Active Problem List   Diagnosis    Hypertension complicating diabetes    Morbid obesity complicating type 2 diabetes mellitus    Gastro-esophageal reflux disease without esophagitis    S/P tubal ligation    History of endometrial ablation    At increased risk for Influenza and Pneumococcal infections due to unvaccinated state AMA    Generalized anxiety disorder with panic attacks    Mood disorder with anxiety    Type 2 diabetes mellitus with other specified complication, without current us of insulin    Hyperlipidemia complicating type 2 diabetes mellitus          Pre-op Assessment    I have reviewed the Patient Summary Reports.     I have reviewed the Nursing Notes. I have reviewed the NPO Status.   I have reviewed the Medications.     Review of Systems  Cardiovascular:     Hypertension                                  Hypertension         Hepatic/GI:  Bowel Prep.   GERD      Gerd          Endocrine:  Diabetes    Diabetes                      Psych:  Psychiatric History                  Physical Exam  General: Well nourished, Cooperative, Alert and Oriented    Airway:  Mallampati: II   Mouth Opening: Normal  TM Distance: Normal  Tongue: Normal  Neck ROM: Normal ROM    Dental:  Intact        Anesthesia Plan  Type of Anesthesia, risks & benefits discussed:    Anesthesia Type: MAC, Gen ETT  Intra-op Monitoring Plan: Standard ASA Monitors  Post Op Pain Control Plan: multimodal analgesia  Induction:  IV  Informed Consent: Informed consent signed with the Patient and all parties understand the risks and agree with anesthesia plan.  All questions answered.   ASA Score: 3  Day of Surgery Review of History & Physical: H&P Update referred to the surgeon/provider.    Ready For Surgery From Anesthesia Perspective.     .

## 2023-12-21 VITALS
BODY MASS INDEX: 42.15 KG/M2 | HEART RATE: 73 BPM | RESPIRATION RATE: 18 BRPM | OXYGEN SATURATION: 97 % | HEIGHT: 65 IN | TEMPERATURE: 98 F | DIASTOLIC BLOOD PRESSURE: 81 MMHG | SYSTOLIC BLOOD PRESSURE: 134 MMHG | WEIGHT: 253 LBS

## 2023-12-21 LAB
FINAL PATHOLOGIC DIAGNOSIS: NORMAL
GROSS: NORMAL
Lab: NORMAL

## 2024-01-02 ENCOUNTER — TELEPHONE (OUTPATIENT)
Dept: INTERNAL MEDICINE | Facility: CLINIC | Age: 49
End: 2024-01-02
Payer: MEDICAID

## 2024-01-03 ENCOUNTER — OFFICE VISIT (OUTPATIENT)
Dept: INTERNAL MEDICINE | Facility: CLINIC | Age: 49
End: 2024-01-03
Payer: MEDICAID

## 2024-01-03 ENCOUNTER — TELEPHONE (OUTPATIENT)
Dept: INTERNAL MEDICINE | Facility: CLINIC | Age: 49
End: 2024-01-03
Payer: MEDICAID

## 2024-01-03 DIAGNOSIS — E78.5 HYPERLIPIDEMIA ASSOCIATED WITH TYPE 2 DIABETES MELLITUS: Chronic | ICD-10-CM

## 2024-01-03 DIAGNOSIS — E11.59 HYPERTENSION COMPLICATING DIABETES: ICD-10-CM

## 2024-01-03 DIAGNOSIS — I15.2 HYPERTENSION COMPLICATING DIABETES: ICD-10-CM

## 2024-01-03 DIAGNOSIS — R80.9 TYPE 2 DIABETES MELLITUS WITH MICROALBUMINURIA, WITHOUT LONG-TERM CURRENT USE OF INSULIN: Primary | Chronic | ICD-10-CM

## 2024-01-03 DIAGNOSIS — R11.2 DRUG-INDUCED NAUSEA AND VOMITING: ICD-10-CM

## 2024-01-03 DIAGNOSIS — T50.905A DRUG-INDUCED NAUSEA AND VOMITING: ICD-10-CM

## 2024-01-03 DIAGNOSIS — E11.69 TYPE 2 DIABETES MELLITUS WITH MORBID OBESITY: Chronic | ICD-10-CM

## 2024-01-03 DIAGNOSIS — E11.69 HYPERLIPIDEMIA ASSOCIATED WITH TYPE 2 DIABETES MELLITUS: Chronic | ICD-10-CM

## 2024-01-03 DIAGNOSIS — E66.01 TYPE 2 DIABETES MELLITUS WITH MORBID OBESITY: Chronic | ICD-10-CM

## 2024-01-03 DIAGNOSIS — E11.29 TYPE 2 DIABETES MELLITUS WITH MICROALBUMINURIA, WITHOUT LONG-TERM CURRENT USE OF INSULIN: Primary | Chronic | ICD-10-CM

## 2024-01-03 PROCEDURE — 1159F MED LIST DOCD IN RCRD: CPT | Mod: CPTII,95,, | Performed by: FAMILY MEDICINE

## 2024-01-03 PROCEDURE — 99214 OFFICE O/P EST MOD 30 MIN: CPT | Mod: 95,,, | Performed by: FAMILY MEDICINE

## 2024-01-03 PROCEDURE — 4010F ACE/ARB THERAPY RXD/TAKEN: CPT | Mod: CPTII,95,, | Performed by: FAMILY MEDICINE

## 2024-01-03 PROCEDURE — 1160F RVW MEDS BY RX/DR IN RCRD: CPT | Mod: CPTII,95,, | Performed by: FAMILY MEDICINE

## 2024-01-03 RX ORDER — ONDANSETRON 4 MG/1
4 TABLET, ORALLY DISINTEGRATING ORAL 2 TIMES DAILY PRN
Qty: 30 TABLET | Refills: 1 | Status: SHIPPED | OUTPATIENT
Start: 2024-01-03

## 2024-01-03 RX ORDER — METFORMIN HYDROCHLORIDE 500 MG/1
500 TABLET, EXTENDED RELEASE ORAL 2 TIMES DAILY WITH MEALS
Qty: 180 TABLET | Refills: 1 | Status: SHIPPED | OUTPATIENT
Start: 2024-01-03 | End: 2024-03-13 | Stop reason: SDUPTHER

## 2024-01-03 RX ORDER — AMLODIPINE AND VALSARTAN 10; 320 MG/1; MG/1
1 TABLET ORAL DAILY
Qty: 90 TABLET | Refills: 2 | Status: SHIPPED | OUTPATIENT
Start: 2024-01-03 | End: 2024-03-13 | Stop reason: SDUPTHER

## 2024-01-03 RX ORDER — ATORVASTATIN CALCIUM 20 MG/1
20 TABLET, FILM COATED ORAL NIGHTLY
Qty: 90 TABLET | Refills: 3 | Status: SHIPPED | OUTPATIENT
Start: 2024-01-03 | End: 2024-03-13 | Stop reason: SDUPTHER

## 2024-01-03 NOTE — ASSESSMENT & PLAN NOTE
Diabetes Management Status    Statin: Not taking  ACE/ARB: Taking    Screening or Prevention Patient's value Goal Complete/Controlled?   HgA1C Testing and Control   Lab Results   Component Value Date    HGBA1C 7.9 (H) 11/28/2023      Annually/Less than 8% Yes   Lipid profile : 11/28/2023 Annually Yes   LDL control Lab Results   Component Value Date    LDLCALC 115.2 11/28/2023    Annually/Less than 100 mg/dl  No   Nephropathy screening Lab Results   Component Value Date    LABMICR 498.0 12/07/2023     Lab Results   Component Value Date    PROTEINUA Negative 11/28/2023    Annually Yes   Blood pressure BP Readings from Last 1 Encounters:   12/20/23 134/81    Less than 140/90 Yes   Dilated retinal exam Most Recent Eye Exam Date: Not Found Annually Yes   Foot exam   Most Recent Foot Exam Date: Not Found Annually Yes     Lab Results   Component Value Date    HGBA1C 7.9 (H) 11/28/2023    HGBA1C 5.7 (H) 02/20/2023    HGBA1C 5.4 07/13/2018    EGFRNORACEVR >60.0 11/28/2023    MICALBCREAT 229.5 (H) 12/07/2023    LDLCALC 115.2 11/28/2023     Lab Results   Component Value Date    GLUTAMICACID 0.00 12/07/2023    CPEPTIDE 5.19 12/07/2023      Last 5 Patient Entered Readings                                          Most Recent A1c:            No data to display               HEALTH MAINTENANCE: Diabetic health maintenance interventions reviewed and are up to date except for:  There are no preventive care reminders to display for this patient.

## 2024-01-03 NOTE — PROGRESS NOTES
01/03/2024 1:27 PM - I joined Virtual Visit to find Adeel driving a motor vehicle in traffic. I quickly explained that this practice was both unsafe and unlawful, and I told her that I would exit the virtual visit and return after giving her time to safely park. I then exited the virtual visit.     TELEMEDICINE VIRTUAL VIDEO VISIT  1/3/24  1:20 PM CST    Visit Type: Audiovisual    Patient's Location: Adeel represents that they are located within the [facility].    CHIEF COMPLAINT: Follow-up    Type 2 diabetes is not optimally controlled. She says that she can tolerate the metformin 500 mg twice daily, but it does cause her to have some loose stool. She does not think she can tolerate a higher dose. We had a lengthy discussion regarding risks and benefits of treatment options. She is highly motivated to make therapeutic lifestyle changes to lose weight to address her obesity, and we discussed how adding Mounjaro can help with that process.    Hypertension is controlled, and she is tolerating the current medication well without adverse effects.    Dyslipidemia, particularly HDL cholesterol deficiency, noted on recent labs. No known contraindication to statin use.      1. Type 2 diabetes mellitus with microalbuminuria, without long-term current use of insulin  Assessment & Plan:  Diabetes Management Status    Statin: Not taking  ACE/ARB: Taking    Screening or Prevention Patient's value Goal Complete/Controlled?   HgA1C Testing and Control   Lab Results   Component Value Date    HGBA1C 7.9 (H) 11/28/2023      Annually/Less than 8% Yes   Lipid profile : 11/28/2023 Annually Yes   LDL control Lab Results   Component Value Date    LDLCALC 115.2 11/28/2023    Annually/Less than 100 mg/dl  No   Nephropathy screening Lab Results   Component Value Date    LABMICR 498.0 12/07/2023     Lab Results   Component Value Date    PROTEINUA Negative 11/28/2023    Annually Yes   Blood pressure BP Readings from Last 1  "Encounters:   12/20/23 134/81    Less than 140/90 Yes   Dilated retinal exam Most Recent Eye Exam Date: Not Found Annually Yes   Foot exam   Most Recent Foot Exam Date: Not Found Annually Yes     Lab Results   Component Value Date    HGBA1C 7.9 (H) 11/28/2023    HGBA1C 5.7 (H) 02/20/2023    HGBA1C 5.4 07/13/2018    EGFRNORACEVR >60.0 11/28/2023    MICALBCREAT 229.5 (H) 12/07/2023    LDLCALC 115.2 11/28/2023     Lab Results   Component Value Date    GLUTAMICACID 0.00 12/07/2023    CPEPTIDE 5.19 12/07/2023      Last 5 Patient Entered Readings                                          Most Recent A1c:            No data to display               HEALTH MAINTENANCE: Diabetic health maintenance interventions reviewed and are up to date except for:  There are no preventive care reminders to display for this patient.     Orders:  -     tirzepatide 7.5 mg/0.5 mL PnIj; Inject 7.5 mg into the skin every 7 days. (BEGIN 7 DAYS AFTER COMPLETING 4 WEEKS OF 5 MG DOSE)  Dispense: 4 pen ; Refill: 0  -     tirzepatide 5 mg/0.5 mL PnIj; Inject 5 mg into the skin every 7 days. (BEGIN 7 DAYS AFTER COMPLETING 4 WEEKS OF 2.5 MG DOSE)  Dispense: 4 pen ; Refill: 0  -     tirzepatide 2.5 mg/0.5 mL PnIj; Inject 2.5 mg into the skin every 7 days.  Dispense: 4 pen ; Refill: 0  -     metFORMIN (GLUCOPHAGE-XR) 500 MG ER 24hr tablet; Take 1 tablet (500 mg total) by mouth 2 (two) times daily with meals.  Dispense: 180 tablet; Refill: 1  -     amlodipine-valsartan (EXFORGE)  mg per tablet; Take 1 tablet by mouth once daily.  Dispense: 90 tablet; Refill: 2  -     Hemoglobin A1C; Standing  -     Comprehensive Metabolic Panel; Standing    2. Morbid obesity complicating type 2 diabetes mellitus  Assessment & Plan:  Estimated body mass index is 42.1 kg/m² as calculated from the following:    Height as of 12/20/23: 5' 5" (1.651 m).    Weight as of 12/20/23: 114.8 kg (253 lb).  No history of allergy or hypersensitivity to GLP-1 Agonists. No history or " family history of thyroid cancer or multiple endocrine neoplasia syndrome.    Orders:  -     ondansetron (ZOFRAN-ODT) 4 MG TbDL; Take 1 tablet (4 mg total) by mouth 2 (two) times daily as needed (if you have nausea associated with diabetes medicine).  Dispense: 30 tablet; Refill: 1  -     tirzepatide 7.5 mg/0.5 mL PnIj; Inject 7.5 mg into the skin every 7 days. (BEGIN 7 DAYS AFTER COMPLETING 4 WEEKS OF 5 MG DOSE)  Dispense: 4 pen ; Refill: 0  -     tirzepatide 5 mg/0.5 mL PnIj; Inject 5 mg into the skin every 7 days. (BEGIN 7 DAYS AFTER COMPLETING 4 WEEKS OF 2.5 MG DOSE)  Dispense: 4 pen ; Refill: 0  -     tirzepatide 2.5 mg/0.5 mL PnIj; Inject 2.5 mg into the skin every 7 days.  Dispense: 4 pen ; Refill: 0    3. Hypertension complicating diabetes  -     amlodipine-valsartan (EXFORGE)  mg per tablet; Take 1 tablet by mouth once daily.  Dispense: 90 tablet; Refill: 2  -     Comprehensive Metabolic Panel; Standing    4. Nausea associated with GLP-1a use  -     ondansetron (ZOFRAN-ODT) 4 MG TbDL; Take 1 tablet (4 mg total) by mouth 2 (two) times daily as needed (if you have nausea associated with diabetes medicine).  Dispense: 30 tablet; Refill: 1    5. Hyperlipidemia complicating type 2 diabetes mellitus  -     atorvastatin (LIPITOR) 20 MG tablet; Take 1 tablet (20 mg total) by mouth every evening. (for cholesterol and heart health)  Dispense: 90 tablet; Refill: 3  -     Comprehensive Metabolic Panel; Standing    Unless noted herein, any chronic conditions are represented as and appear stable, and no other significant complaints or concerns were reported.    Review of Systems   Constitutional:  Negative for activity change.   HENT:  Negative for hearing loss, rhinorrhea and trouble swallowing.    Eyes:  Negative for discharge.   Respiratory:  Negative for chest tightness, shortness of breath and wheezing.    Cardiovascular:  Negative for chest pain and palpitations.   Gastrointestinal:  Negative for blood in  "stool, constipation, diarrhea and vomiting.   Endocrine: Negative for polydipsia and polyuria.   Genitourinary:  Negative for difficulty urinating, dysuria, hematuria and menstrual problem.   Musculoskeletal:  Negative for arthralgias, joint swelling and neck pain.   Neurological:  Negative for headaches.   Psychiatric/Behavioral:  Negative for confusion and dysphoric mood.        Physical Exam  Constitutional:       General: She is not in acute distress.     Appearance: Normal appearance. She is not ill-appearing.   Pulmonary:      Effort: Pulmonary effort is normal. No respiratory distress.   Skin:     Coloration: Skin is not jaundiced.   Neurological:      Mental Status: She is alert. Mental status is at baseline.   Psychiatric:         Mood and Affect: Mood normal.         Behavior: Behavior normal.         Thought Content: Thought content normal.     Follow up in about 3 months (around 3/29/2024) for virtual visit, review test results after completion, discuss plan, re-evaluation.   Schedule VV with me around the end of March.  Schedule labs (A1c, CMP) and Nurse Visit to check BLOOD PRESSURE and WEIGHT a few days before that appointment.  Schedule OV with me around end of June.  Future Appointments   Date Time Provider Department Center   2/12/2024  8:00 AM [facility] MAMMO3-BX [facility] MAMMO [place]   7/1/2024  2:00 PM Shandra Carson MD [facility]       Documentation entered by me for this encounter may have been done in part using speech-recognition technology. Although I have made an effort to ensure accuracy, "sound like" errors may exist and should be interpreted in context.   TOTAL TIME evaluating and managing this patient for this encounter was greater than or equal to 35 minutes. This time was exclusive of any separately billable procedures for this patient and exclusive of time spent treating any other patient.    Documentation entered by me for this encounter may have been done in part using " "speech-recognition technology. Although I have made an effort to ensure accuracy, "sound like" errors may exist and should be interpreted in context.    Each patient to whom medical services are provided by telemedicine is: (1) informed of the relationship between the physician and patient and the respective role of any other health care provider with respect to management of the patient; and (2) notified that he or she may decline to receive medical services by telemedicine and may withdraw from such care at any time.  "

## 2024-01-03 NOTE — ASSESSMENT & PLAN NOTE
"Estimated body mass index is 42.1 kg/m² as calculated from the following:    Height as of 12/20/23: 5' 5" (1.651 m).    Weight as of 12/20/23: 114.8 kg (253 lb).  No history of allergy or hypersensitivity to GLP-1 Agonists. No history or family history of thyroid cancer or multiple endocrine neoplasia syndrome.  "

## 2024-01-03 NOTE — Clinical Note
"--------------------------------------------------------------------------------  Adeel Evan Campbell" (MRN [id number]) Virtual Visit Appointment 1/3/24  1:20 PM CST -------------------------------- Schedule VV with me around the end of March. Schedule labs (A1c, CMP) and Nurse Visit to check BLOOD PRESSURE and WEIGHT a few days before that appointment. Schedule OV with me around end of June. Thanks. --------------------------------------------------------------------------------  "

## 2024-01-10 ENCOUNTER — TELEPHONE (OUTPATIENT)
Dept: INTERNAL MEDICINE | Facility: CLINIC | Age: 49
End: 2024-01-10
Payer: MEDICAID

## 2024-01-10 NOTE — TELEPHONE ENCOUNTER
Pt notified PA approved on 1/4/2024 with a copayment of $3 as noted in the system by CAROLYN Rausch. Pt verbalized understanding.//ddw

## 2024-01-10 NOTE — TELEPHONE ENCOUNTER
----- Message from Barry Alfaro sent at 1/10/2024  9:49 AM CST -----  Contact: Adeel Das is calling in regards to needing prior authorization for medication tirzepatide 2.5 mg/0.5 mL PnIj 4 pen. Please call back at  [phone]                          Thanks  KT

## 2024-02-01 ENCOUNTER — TELEPHONE (OUTPATIENT)
Dept: INTERNAL MEDICINE | Facility: CLINIC | Age: 49
End: 2024-02-01
Payer: MEDICAID

## 2024-02-01 NOTE — TELEPHONE ENCOUNTER
----- Message from Jackie henri sent at 2/1/2024  9:36 AM CST -----  Name of Who is Calling:patient           What is the request in detail:  Patient states she took her last shot of monjouro and is requesting a call to see if she needs refills or what to do next         Can the clinic reply by [facility]:no           What Number to Call Back if not in [facility]: [phone]

## 2024-02-12 ENCOUNTER — HOSPITAL ENCOUNTER (OUTPATIENT)
Dept: RADIOLOGY | Facility: HOSPITAL | Age: 49
Discharge: HOME | End: 2024-02-12
Attending: FAMILY MEDICINE
Payer: MEDICAID

## 2024-02-12 DIAGNOSIS — Z12.31 BREAST CANCER SCREENING BY MAMMOGRAM: ICD-10-CM

## 2024-02-12 PROCEDURE — 77067 SCR MAMMO BI INCL CAD: CPT | Mod: 26,,, | Performed by: RADIOLOGY

## 2024-02-12 PROCEDURE — 77067 SCR MAMMO BI INCL CAD: CPT | Mod: TC

## 2024-02-12 PROCEDURE — 77063 BREAST TOMOSYNTHESIS BI: CPT | Mod: 26,,, | Performed by: RADIOLOGY

## 2024-02-27 ENCOUNTER — LAB VISIT (OUTPATIENT)
Dept: LAB | Facility: HOSPITAL | Age: 49
End: 2024-02-27
Attending: FAMILY MEDICINE
Payer: MEDICAID

## 2024-02-27 ENCOUNTER — TELEPHONE (OUTPATIENT)
Dept: INTERNAL MEDICINE | Facility: CLINIC | Age: 49
End: 2024-02-27
Payer: MEDICAID

## 2024-02-27 DIAGNOSIS — E66.01 TYPE 2 DIABETES MELLITUS WITH MORBID OBESITY: Chronic | ICD-10-CM

## 2024-02-27 DIAGNOSIS — E11.29 TYPE 2 DIABETES MELLITUS WITH MICROALBUMINURIA, WITHOUT LONG-TERM CURRENT USE OF INSULIN: Chronic | ICD-10-CM

## 2024-02-27 DIAGNOSIS — E78.5 HYPERLIPIDEMIA ASSOCIATED WITH TYPE 2 DIABETES MELLITUS: Chronic | ICD-10-CM

## 2024-02-27 DIAGNOSIS — R80.9 TYPE 2 DIABETES MELLITUS WITH MICROALBUMINURIA, WITHOUT LONG-TERM CURRENT USE OF INSULIN: Chronic | ICD-10-CM

## 2024-02-27 DIAGNOSIS — E11.69 TYPE 2 DIABETES MELLITUS WITH MORBID OBESITY: Chronic | ICD-10-CM

## 2024-02-27 DIAGNOSIS — I15.2 HYPERTENSION COMPLICATING DIABETES: ICD-10-CM

## 2024-02-27 DIAGNOSIS — E11.69 HYPERLIPIDEMIA ASSOCIATED WITH TYPE 2 DIABETES MELLITUS: Chronic | ICD-10-CM

## 2024-02-27 DIAGNOSIS — E11.59 HYPERTENSION COMPLICATING DIABETES: ICD-10-CM

## 2024-02-27 LAB
ALBUMIN SERPL BCP-MCNC: 3.9 G/DL (ref 3.5–5.2)
ALP SERPL-CCNC: 62 U/L (ref 55–135)
ALT SERPL W/O P-5'-P-CCNC: 25 U/L (ref 10–44)
ANION GAP SERPL CALC-SCNC: 11 MMOL/L (ref 8–16)
AST SERPL-CCNC: 25 U/L (ref 10–40)
BILIRUB SERPL-MCNC: 0.5 MG/DL (ref 0.1–1)
BUN SERPL-MCNC: 11 MG/DL (ref 6–20)
CALCIUM SERPL-MCNC: 10.4 MG/DL (ref 8.7–10.5)
CHLORIDE SERPL-SCNC: 109 MMOL/L (ref 95–110)
CO2 SERPL-SCNC: 23 MMOL/L (ref 23–29)
CREAT SERPL-MCNC: 0.8 MG/DL (ref 0.5–1.4)
EST. GFR  (NO RACE VARIABLE): >60 ML/MIN/1.73 M^2
ESTIMATED AVG GLUCOSE: 117 MG/DL (ref 68–131)
GLUCOSE SERPL-MCNC: 70 MG/DL (ref 70–110)
HBA1C MFR BLD: 5.7 % (ref 4–5.6)
POTASSIUM SERPL-SCNC: 3.9 MMOL/L (ref 3.5–5.1)
PROT SERPL-MCNC: 7 G/DL (ref 6–8.4)
SODIUM SERPL-SCNC: 143 MMOL/L (ref 136–145)

## 2024-02-27 PROCEDURE — 83036 HEMOGLOBIN GLYCOSYLATED A1C: CPT | Performed by: FAMILY MEDICINE

## 2024-02-27 PROCEDURE — 80053 COMPREHEN METABOLIC PANEL: CPT | Performed by: FAMILY MEDICINE

## 2024-02-27 PROCEDURE — 36415 COLL VENOUS BLD VENIPUNCTURE: CPT | Performed by: FAMILY MEDICINE

## 2024-02-27 NOTE — TELEPHONE ENCOUNTER
Care Due:                  Date            Visit Type   Department     Provider  --------------------------------------------------------------------------------                                ESTABLISHED                              PATIENT -    [facility] INTERNAL  Last Visit: 01-      VIRTUAL      MEDICINE       Roberth Leigh                              ESTABLISHED                              PATIENT -    [facility] INTERNAL  Next Visit: 04-      [facility]       Roberth Leigh                                                            Last  Test          Frequency    Reason                     Performed    Due Date  --------------------------------------------------------------------------------    HBA1C.......  6 months...  metFORMIN, tirzepatide...  11- 05-    [facility] Embedded Care Due Messages. Reference number: [id number].   2/27/2024 1:03:53 PM CST

## 2024-02-27 NOTE — TELEPHONE ENCOUNTER
----- Message from Jamin Whitaker sent at 2/27/2024 12:39 PM CST -----  Contact: Adeel Das is calling in regards to getting a call back to discuss labs and medication refills.  Please call back at  [phone]      Thank

## 2024-02-27 NOTE — TELEPHONE ENCOUNTER
----- Message from Semaj Sutherland sent at 2/27/2024 10:38 AM CST -----  Contact: [phone]  Type:  RX Refill Request    Who Called: Adeel   Refill or New Rx:refill  RX Name and Strength:tirzepatide 7.5 mg/0.5 mL PnIj  How is the patient currently taking it? (ex. 1XDay):  Is this a 30 day or 90 day RX:  Preferred Pharmacy with phone number:  [id number] - [place] - [address] AT [facility]  [address] [place] [address]  Phone: [phone] Fax: [phone]  Local or Mail Order:local   Ordering Provider:Dr Leigh   Would the patient rather a call back or a response via [facility]? Call back   Best Call Back Number:[phone]   Additional Information:

## 2024-02-28 NOTE — TELEPHONE ENCOUNTER
REFILL APPROVED. Will address further refills at upcoming appointment with me listed below.  Requested Prescriptions   Pending Prescriptions Disp Refills    tirzepatide 7.5 mg/0.5 mL PnIj 4 pen  1     Sig: Inject 7.5 mg into the skin every 7 days. (BEGIN 7 DAYS AFTER COMPLETING 4 WEEKS OF 5 MG DOSE)    #LMRX   --------------------------------  Future Appointments   Date Time Provider Department Center   3/25/2024 10:00 AM NURSE, [facility] INTERNAL MEDICINE [facility]   4/1/2024  1:00 PM KAREN Leigh MD [facility] [place]   6/27/2024  9:40 AM KAREN Leigh MD [facility]   7/1/2024  2:00 PM Shandra Carson MD [facility]

## 2024-03-08 ENCOUNTER — TELEPHONE (OUTPATIENT)
Dept: INTERNAL MEDICINE | Facility: CLINIC | Age: 49
End: 2024-03-08
Payer: MEDICAID

## 2024-03-08 NOTE — TELEPHONE ENCOUNTER
----- Message from Betty Steven sent at 3/7/2024  4:05 PM CST -----  Contact: Deep Randall is requesting a call to reschedule upcoming virtual visit, please call .[phone]           Thanks

## 2024-03-13 ENCOUNTER — OFFICE VISIT (OUTPATIENT)
Dept: INTERNAL MEDICINE | Facility: CLINIC | Age: 49
End: 2024-03-13
Payer: MEDICAID

## 2024-03-13 DIAGNOSIS — E11.69 HYPERLIPIDEMIA ASSOCIATED WITH TYPE 2 DIABETES MELLITUS: Chronic | ICD-10-CM

## 2024-03-13 DIAGNOSIS — R80.9 TYPE 2 DIABETES MELLITUS WITH MICROALBUMINURIA, WITHOUT LONG-TERM CURRENT USE OF INSULIN: Primary | Chronic | ICD-10-CM

## 2024-03-13 DIAGNOSIS — E11.59 HYPERTENSION COMPLICATING DIABETES: Chronic | ICD-10-CM

## 2024-03-13 DIAGNOSIS — E66.01 TYPE 2 DIABETES MELLITUS WITH MORBID OBESITY: Chronic | ICD-10-CM

## 2024-03-13 DIAGNOSIS — E11.69 TYPE 2 DIABETES MELLITUS WITH MORBID OBESITY: Chronic | ICD-10-CM

## 2024-03-13 DIAGNOSIS — I15.2 HYPERTENSION COMPLICATING DIABETES: Chronic | ICD-10-CM

## 2024-03-13 DIAGNOSIS — E11.29 TYPE 2 DIABETES MELLITUS WITH MICROALBUMINURIA, WITHOUT LONG-TERM CURRENT USE OF INSULIN: Primary | Chronic | ICD-10-CM

## 2024-03-13 DIAGNOSIS — E78.5 HYPERLIPIDEMIA ASSOCIATED WITH TYPE 2 DIABETES MELLITUS: Chronic | ICD-10-CM

## 2024-03-13 PROCEDURE — 4010F ACE/ARB THERAPY RXD/TAKEN: CPT | Mod: CPTII,95,, | Performed by: FAMILY MEDICINE

## 2024-03-13 PROCEDURE — 1160F RVW MEDS BY RX/DR IN RCRD: CPT | Mod: CPTII,95,, | Performed by: FAMILY MEDICINE

## 2024-03-13 PROCEDURE — 1159F MED LIST DOCD IN RCRD: CPT | Mod: CPTII,95,, | Performed by: FAMILY MEDICINE

## 2024-03-13 PROCEDURE — 3044F HG A1C LEVEL LT 7.0%: CPT | Mod: CPTII,95,, | Performed by: FAMILY MEDICINE

## 2024-03-13 PROCEDURE — 99214 OFFICE O/P EST MOD 30 MIN: CPT | Mod: 95,,, | Performed by: FAMILY MEDICINE

## 2024-03-13 RX ORDER — AMLODIPINE AND VALSARTAN 10; 320 MG/1; MG/1
1 TABLET ORAL DAILY
Qty: 90 TABLET | Refills: 3 | Status: SHIPPED | OUTPATIENT
Start: 2024-03-13

## 2024-03-13 RX ORDER — ATORVASTATIN CALCIUM 20 MG/1
20 TABLET, FILM COATED ORAL NIGHTLY
Qty: 90 TABLET | Refills: 3 | Status: SHIPPED | OUTPATIENT
Start: 2024-03-13 | End: 2025-03-13

## 2024-03-13 RX ORDER — METFORMIN HYDROCHLORIDE 500 MG/1
500 TABLET, EXTENDED RELEASE ORAL 2 TIMES DAILY WITH MEALS
Qty: 180 TABLET | Refills: 3 | Status: SHIPPED | OUTPATIENT
Start: 2024-03-13

## 2024-03-25 ENCOUNTER — TELEPHONE (OUTPATIENT)
Dept: INTERNAL MEDICINE | Facility: CLINIC | Age: 49
End: 2024-03-25

## 2024-03-25 ENCOUNTER — CLINICAL SUPPORT (OUTPATIENT)
Dept: INTERNAL MEDICINE | Facility: CLINIC | Age: 49
End: 2024-03-25
Payer: MEDICAID

## 2024-03-25 VITALS
WEIGHT: 236.31 LBS | SYSTOLIC BLOOD PRESSURE: 126 MMHG | DIASTOLIC BLOOD PRESSURE: 88 MMHG | BODY MASS INDEX: 39.33 KG/M2 | HEART RATE: 85 BPM

## 2024-03-25 DIAGNOSIS — Z71.9 COUNSELED BY NURSE: Primary | ICD-10-CM

## 2024-03-25 PROCEDURE — 99999 PR PBB SHADOW E&M-EST. PATIENT-LVL II: CPT | Mod: PBBFAC,,,

## 2024-03-25 PROCEDURE — 99212 OFFICE O/P EST SF 10 MIN: CPT | Mod: PBBFAC

## 2024-03-25 NOTE — TELEPHONE ENCOUNTER
Pt was present on today for a B/P check/weight check/pulse check on today. Pt reading were stable on all vitals taken on today. Pt informed to continue to monitor b/p readings and if any elevated readings, to contact the clinic regarding this. Pt voiced understanding./Jamesw

## 2024-04-01 NOTE — PROGRESS NOTES
TELEMEDICINE VIRTUAL VIDEO VISIT  3/13/24  7:00 AM CDT    Visit Type: Audiovisual    Patient's Location: Adeel represents that they are located within the state of [place].    History of Present Illness    Adeel presents today for follow-up and to discuss medications and needed refills.    She reports a reduction in her most recent hemoglobin A1C from 7.9 to 5.7, which she acknowledges as a positive result. She is aware that her LDL cholesterol is slightly high at 115.    She is taking Mounjaro 7.5 mg every seven days and reports good tolerance. She also confirms that she is on metformin for diabetes management and amlodipine valsartan for blood pressure control, both with no adverse effects. Despite a prescription, she admits to directly not regularly taking Atorvastatin 20mg for her cholesterol.    She expressed concern about not having a Pap smear scheduled.    She raised concern about needing refill for her medications. Her specific mention was for Montelukast, and she requested these refills to be sent to her pharmacy, [facility], located at [facility].       Review of Systems   Respiratory:  Negative for chest tightness and shortness of breath.    Cardiovascular:  Negative for chest pain.   Endocrine: Negative for polydipsia and polyuria.   Integumentary:  Negative for pallor.   Neurological:  Negative for dizziness, tremors, seizures and speech difficulty.   Psychiatric/Behavioral:  Negative for confusion. The patient is not nervous/anxious.        Assessment & Plan    DIABETES MANAGEMENT:   The patient continues to tolerate the current regimen of metformin well.  HYPERTENSION MANAGEMENT:   The patient continues to tolerate the current regimen of amlodipine valsartan well.  HYPERLIPIDEMIA MANAGEMENT:   Atorvastatin is to be taken every evening.  ASTHMA MANAGEMENT:   Refills for Montelukast have been provided.  FOLLOW-UP AND LABS:   The next labs and follow-up visit are scheduled.       1.  Type 2 diabetes mellitus with microalbuminuria, without long-term current use of insulin  -     amlodipine-valsartan (EXFORGE)  mg per tablet; Take 1 tablet by mouth once daily.  Dispense: 90 tablet; Refill: 3  -     metFORMIN (GLUCOPHAGE-XR) 500 MG ER 24hr tablet; Take 1 tablet (500 mg total) by mouth 2 (two) times daily with meals.  Dispense: 180 tablet; Refill: 3  -     tirzepatide 7.5 mg/0.5 mL PnIj; Inject 7.5 mg into the skin every 7 days.  Dispense: 4 Pen; Refill: 5    2. Hypertension complicating diabetes  -     amlodipine-valsartan (EXFORGE)  mg per tablet; Take 1 tablet by mouth once daily.  Dispense: 90 tablet; Refill: 3    3. Hyperlipidemia complicating type 2 diabetes mellitus  -     atorvastatin (LIPITOR) 20 MG tablet; Take 1 tablet (20 mg total) by mouth every evening. (for cholesterol and heart health)  Dispense: 90 tablet; Refill: 3    4. Morbid obesity complicating type 2 diabetes mellitus  -     tirzepatide 7.5 mg/0.5 mL PnIj; Inject 7.5 mg into the skin every 7 days.  Dispense: 4 Pen; Refill: 5       Unless noted herein, any chronic conditions are represented as and appear stable, and no other significant complaints or concerns were reported.    There were no vitals filed for this visit.  Physical Exam    Constitutional: No acute distress. Normal appearance. Not ill-appearing.  Pulmonary: Pulmonary effort is normal. No respiratory distress.  Skin: Skin is not jaundiced.  Neurological: Alert. Mental status is at baseline.  Psychiatric: Mood normal. Behavior normal. Thought content normal.         This note was generated with the assistance of ambient listening technology. Verbal consent was obtained by the patient and accompanying visitor(s) for the recording of patient appointment to facilitate this note. I attest to having reviewed and edited the generated note for accuracy, though some syntax or spelling errors may persist. Please contact the author of this note for any  "clarification.      TOTAL TIME evaluating and managing this patient for this encounter was greater than or equal to 31 minutes.  This time was exclusive of any separately billable procedures for this patient and exclusive of time spent treating any other patient.    Documentation entered by me for this encounter may have been done in part using speech-recognition technology. Although I have made an effort to ensure accuracy, "sound like" errors may exist and should be interpreted in context.    Each patient to whom medical services are provided by telemedicine is: (1) informed of the relationship between the physician and patient and the respective role of any other health care provider with respect to management of the patient; and (2) notified that he or she may decline to receive medical services by telemedicine and may withdraw from such care at any time.   "

## 2024-04-03 ENCOUNTER — OFFICE VISIT (OUTPATIENT)
Dept: INTERNAL MEDICINE | Facility: CLINIC | Age: 49
End: 2024-04-03
Payer: MEDICAID

## 2024-04-03 ENCOUNTER — TELEPHONE (OUTPATIENT)
Dept: INTERNAL MEDICINE | Facility: CLINIC | Age: 49
End: 2024-04-03
Payer: MEDICAID

## 2024-04-03 DIAGNOSIS — B37.31 CANDIDA VAGINITIS: Primary | ICD-10-CM

## 2024-04-03 DIAGNOSIS — E66.01 TYPE 2 DIABETES MELLITUS WITH MORBID OBESITY: Chronic | ICD-10-CM

## 2024-04-03 DIAGNOSIS — E78.5 HYPERLIPIDEMIA ASSOCIATED WITH TYPE 2 DIABETES MELLITUS: Chronic | ICD-10-CM

## 2024-04-03 DIAGNOSIS — E11.59 HYPERTENSION COMPLICATING DIABETES: Chronic | ICD-10-CM

## 2024-04-03 DIAGNOSIS — R80.9 TYPE 2 DIABETES MELLITUS WITH MICROALBUMINURIA: Chronic | ICD-10-CM

## 2024-04-03 DIAGNOSIS — M79.672 PAIN IN BOTH FEET: ICD-10-CM

## 2024-04-03 DIAGNOSIS — E11.69 TYPE 2 DIABETES MELLITUS WITH MORBID OBESITY: Chronic | ICD-10-CM

## 2024-04-03 DIAGNOSIS — M79.671 PAIN IN BOTH FEET: ICD-10-CM

## 2024-04-03 DIAGNOSIS — E11.69 HYPERLIPIDEMIA ASSOCIATED WITH TYPE 2 DIABETES MELLITUS: Chronic | ICD-10-CM

## 2024-04-03 DIAGNOSIS — E11.29 TYPE 2 DIABETES MELLITUS WITH MICROALBUMINURIA: Chronic | ICD-10-CM

## 2024-04-03 DIAGNOSIS — I15.2 HYPERTENSION COMPLICATING DIABETES: Chronic | ICD-10-CM

## 2024-04-03 PROCEDURE — 1160F RVW MEDS BY RX/DR IN RCRD: CPT | Mod: CPTII,95,, | Performed by: FAMILY MEDICINE

## 2024-04-03 PROCEDURE — 4010F ACE/ARB THERAPY RXD/TAKEN: CPT | Mod: CPTII,95,, | Performed by: FAMILY MEDICINE

## 2024-04-03 PROCEDURE — 3044F HG A1C LEVEL LT 7.0%: CPT | Mod: CPTII,95,, | Performed by: FAMILY MEDICINE

## 2024-04-03 PROCEDURE — 99214 OFFICE O/P EST MOD 30 MIN: CPT | Mod: 95,,, | Performed by: FAMILY MEDICINE

## 2024-04-03 PROCEDURE — 1159F MED LIST DOCD IN RCRD: CPT | Mod: CPTII,95,, | Performed by: FAMILY MEDICINE

## 2024-04-03 PROCEDURE — G2211 COMPLEX E/M VISIT ADD ON: HCPCS | Mod: 95,,, | Performed by: FAMILY MEDICINE

## 2024-04-03 RX ORDER — FLUCONAZOLE 150 MG/1
TABLET ORAL
Qty: 2 TABLET | Refills: 0 | Status: SHIPPED | OUTPATIENT
Start: 2024-04-03

## 2024-04-03 NOTE — TELEPHONE ENCOUNTER
----- Message from Marcela Alfaro sent at 4/3/2024  9:02 AM CDT -----  Contact: self  Patient requesting a call back regarding side effects that she is having from tirzepatide 7.5 mg/0.5 mL PnIj. Please call back @ [phone]

## 2024-04-03 NOTE — PROGRESS NOTES
TELEMEDICINE VIRTUAL VIDEO VISIT  4/3/24  1:00 PM CDT    Visit Type: Audiovisual    Patient's Location: Adeel represents that they are located within the state of [place].    History of Present Illness    Adeel presents today due to experiencing a rash in her vaginal area and numbness and pain in her feet    Adeel reports moderate, intermittent vaginal rash which fluctuates in appearance, distinct from her previous yeast infections. She denies any associated drainage or odor and has not taken any recent antibiotics which have been associated with her past yeast infections.    Adeel reports numbness and pain in her feet, notably increasing when she stands or walks for prolonged periods, or when wearing flat shoes. The pain is mainly in the heel region, more affected in the right foot, although the pain sometimes extends to the left foot. She queried whether her diabetic condition could be causing this discomfort. She expressed interest in managing or potentially eliminating her diabetes through weight loss, already reporting a loss of 30 lbs.    Her feet are symptom-free when she wears wedged footwear.       Review of Systems   Respiratory:  Negative for chest tightness and shortness of breath.    Cardiovascular:  Negative for chest pain.   Endocrine: Negative for polyuria.   Integumentary:  Negative for pallor.   Neurological:  Negative for dizziness, tremors, seizures, speech difficulty, weakness and headaches.   Psychiatric/Behavioral:  Negative for confusion. The patient is not nervous/anxious.        Assessment & Plan     Attributed the patient's foot pain to inadequate arch support in her footwear, not diabetes.   Prescribed Diflucan for the presumed yeast infection, instructing the patient to take one tablet as a full course, with an additional tablet provided for potential recurrence.   Scheduled an in-office appointment for June.   Advised the patient to contact her gynecologist, Lorie  May, if the yeast infection symptoms persist post-Diflucan treatment.   Recommend footwear with good arch support to alleviate foot pain.   Commended the patient for her significant weight loss, noting a decrease of nearly 30 lbs.   Clarified that the patient's reaction to amoxicillin is not an allergy, but a side effect resulting in yeast infections.   Reassured the patient that her diabetes medication, Mounjaro, is not typically associated with yeast infections.       1. Candida vaginitis  -     fluconazole (DIFLUCAN) 150 MG Tab; Take 1 tablet (150 mg) by mouth for vaginal yeast infection. (1 tablet = full course of treatment)  Dispense: 2 tablet; Refill: 0    2. Morbid obesity complicating type 2 diabetes mellitus    3. Pain in both feet    4. Type 2 diabetes mellitus with microalbuminuria    5. Hypertension complicating diabetes  Assessment & Plan:  BP Readings from Last 6 Encounters:   03/25/24 126/88   12/20/23 134/81   11/28/23 122/86   11/20/23 130/88   08/11/23 126/88   04/18/23 120/82     Lab Results   Component Value Date    EGFRNORACEVR >60.0 02/27/2024    CREATININE 0.8 02/27/2024    BUN 11 02/27/2024    K 3.9 02/27/2024     02/27/2024     02/27/2024     Results for orders placed or performed during the hospital encounter of 11/28/23   SCHEDULED EKG 12-LEAD (to Muse)    Collection Time: 11/28/23  9:16 AM    Narrative    Test Reason : I10,    Vent. Rate : 080 BPM     Atrial Rate : 080 BPM     P-R Int : 156 ms          QRS Dur : 090 ms      QT Int : 364 ms       P-R-T Axes : 037 011 010 degrees     QTc Int : 419 ms    Normal sinus rhythm  Normal ECG  When compared with ECG of 25-FEB-2022 09:01,  No significant change was found  Confirmed by JOHN CREWS MD ([id number]) on 11/28/2023 10:04:24 AM    Referred By: KAREN ANDERSON           Confirmed By:JOHN CREWS MD          6. Hyperlipidemia complicating type 2 diabetes mellitus  Assessment & Plan:  Lab Results   Component Value Date    CHOL 197  11/28/2023    CHOL 179 02/20/2023    TRIG 214 (H) 11/28/2023    TRIG 140 02/20/2023    HDL 39 (L) 11/28/2023    HDL 40 02/20/2023    LDLCALC 115.2 11/28/2023    LDLCALC 111.0 02/20/2023    NONHDLCHOL 158 11/28/2023    NONHDLCHOL 139 02/20/2023    AST 25 02/27/2024    ALT 25 02/27/2024     The 10-year ASCVD risk score (Yojana LAKE, et al., 2019) is: 11.3%    Values used to calculate the score:      Age: 49 years      Sex: Female      Is Non- : Yes      Diabetic: Yes      Tobacco smoker: No      Systolic Blood Pressure: 126 mmHg      Is BP treated: Yes      HDL Cholesterol: 39 mg/dL      Total Cholesterol: 197 mg/dL       No other significant complaints or concerns were reported.    PRESCRIPTION DRUG MANAGEMENT  Adeel was instructed to continue other current prescription medications previously prescribed by me. No additional refills required at this time.    Today's visit involved the intricate management of episodic problem(s) and the ongoing care for the patient's serious or complex condition(s) listed above, reflecting the inherent complexity of providing longitudinal, comprehensive evaluation and management as the central hub for the patient's primary care services.    There were no vitals filed for this visit.  Physical Exam    Constitutional: No acute distress. Normal appearance. Not ill-appearing.  Pulmonary: Pulmonary effort is normal. No respiratory distress.  Skin: Skin is not jaundiced.  Neurological: Alert. Mental status is at baseline.  Psychiatric: Mood normal. Behavior normal. Thought content normal.         This note was generated with the assistance of ambient listening technology. Verbal consent was obtained by the patient and accompanying visitor(s) for the recording of patient appointment to facilitate this note. I attest to having reviewed and edited the generated note for accuracy, though some syntax or spelling errors may persist. Please contact the author of this note  "for any clarification.    TOTAL TIME evaluating and managing this patient for this encounter was greater than or equal to 24 minutes.  This time was exclusive of any separately billable procedures for this patient and exclusive of time spent treating any other patient.    Documentation entered by me for this encounter may have been done in part using speech-recognition technology. Although I have made an effort to ensure accuracy, "sound like" errors may exist and should be interpreted in context.    Each patient to whom medical services are provided by telemedicine is: (1) informed of the relationship between the physician and patient and the respective role of any other health care provider with respect to management of the patient; and (2) notified that he or she may decline to receive medical services by telemedicine and may withdraw from such care at any time.   "

## 2024-04-13 NOTE — ASSESSMENT & PLAN NOTE
BP Readings from Last 6 Encounters:   03/25/24 126/88   12/20/23 134/81   11/28/23 122/86   11/20/23 130/88   08/11/23 126/88   04/18/23 120/82     Lab Results   Component Value Date    EGFRNORACEVR >60.0 02/27/2024    CREATININE 0.8 02/27/2024    BUN 11 02/27/2024    K 3.9 02/27/2024     02/27/2024     02/27/2024     Results for orders placed or performed during the hospital encounter of 11/28/23   SCHEDULED EKG 12-LEAD (to Muse)    Collection Time: 11/28/23  9:16 AM    Narrative    Test Reason : I10,    Vent. Rate : 080 BPM     Atrial Rate : 080 BPM     P-R Int : 156 ms          QRS Dur : 090 ms      QT Int : 364 ms       P-R-T Axes : 037 011 010 degrees     QTc Int : 419 ms    Normal sinus rhythm  Normal ECG  When compared with ECG of 25-FEB-2022 09:01,  No significant change was found  Confirmed by JOHN CREWS MD ([id number]) on 11/28/2023 10:04:24 AM    Referred By: KAREN ANDERSON           Confirmed By:JOHN CREWS MD

## 2024-04-13 NOTE — ASSESSMENT & PLAN NOTE
Lab Results   Component Value Date    CHOL 197 11/28/2023    CHOL 179 02/20/2023    TRIG 214 (H) 11/28/2023    TRIG 140 02/20/2023    HDL 39 (L) 11/28/2023    HDL 40 02/20/2023    LDLCALC 115.2 11/28/2023    LDLCALC 111.0 02/20/2023    NONHDLCHOL 158 11/28/2023    NONHDLCHOL 139 02/20/2023    AST 25 02/27/2024    ALT 25 02/27/2024     The 10-year ASCVD risk score (Yojana LAKE, et al., 2019) is: 11.3%    Values used to calculate the score:      Age: 49 years      Sex: Female      Is Non- : Yes      Diabetic: Yes      Tobacco smoker: No      Systolic Blood Pressure: 126 mmHg      Is BP treated: Yes      HDL Cholesterol: 39 mg/dL      Total Cholesterol: 197 mg/dL

## 2024-04-15 ENCOUNTER — TELEPHONE (OUTPATIENT)
Dept: INTERNAL MEDICINE | Facility: CLINIC | Age: 49
End: 2024-04-15
Payer: MEDICAID

## 2024-04-15 NOTE — TELEPHONE ENCOUNTER
----- Message from Arline Ferreira sent at 4/15/2024 12:38 PM CDT -----  Contact: self   1MEDICALADVICE     Patient is calling for Medical Advice regarding:foot pain. Both feet     How long has patient had these symptoms:off on on for a while. Yesterday was the worse     Pharmacy name and phone#:    Would like response via [employer]:  call back     Comments:

## 2024-04-15 NOTE — TELEPHONE ENCOUNTER
Pt states during VV on 04/03/2024 she discussed pain in both her feet. Pt states she cannot wait until next upcoming OV scheduled to discuss next steps, she states she cannot bear the pain any longer. She states she needs something for pain  and she will like to be tested to see if she has diabetic neuropathy.

## 2024-04-22 ENCOUNTER — TELEPHONE (OUTPATIENT)
Dept: INTERNAL MEDICINE | Facility: CLINIC | Age: 49
End: 2024-04-22
Payer: MEDICAID

## 2024-04-22 DIAGNOSIS — M79.673 CHRONIC FOOT PAIN, UNSPECIFIED LATERALITY: Primary | Chronic | ICD-10-CM

## 2024-04-22 DIAGNOSIS — G89.29 CHRONIC FOOT PAIN, UNSPECIFIED LATERALITY: Primary | Chronic | ICD-10-CM

## 2024-04-22 NOTE — TELEPHONE ENCOUNTER
----- Message from Haydee Ervin sent at 4/22/2024  3:49 PM CDT -----  Contact: ADELINA EASON [[id number]]  ..Type:  Patient Requesting Call    Who Called: ADELINA EASON [[id number]]  Does the patient know what this is regarding?: reports requesting call back since 4/15 regarding feet issues  Would the patient rather a call back or a response via [facility]?  call  Best Call Back Number: .[phone] (home)   Additional Information:

## 2024-04-22 NOTE — TELEPHONE ENCOUNTER
----- Message from Haydee Ervin sent at 4/22/2024  3:49 PM CDT -----  Contact: ADELINA EASON [[id number]]  ..Type:  Patient Requesting Call    Who Called: ADELINA EASON [[id number]]  Does the patient know what this is regarding?: reports requesting call back since 4/15 regarding feet issues  Would the patient rather a call back or a response via [facility]?  call  Best Call Back Number: .[phone] (home)   Additional Information:

## 2024-04-22 NOTE — TELEPHONE ENCOUNTER
TO MY TEAM:   Notify her of referral rder. She will need to schedule.  Other Options: She can go to urgent care or schedule OV with [facility]. (This requires OV, not VV, to manage.)    Dx: Chronic foot pain, bilateral  Order: Ambulatory referral/consult to Podiatry; Future     Future Appointments   Date Time Provider Department Center   6/27/2024  9:40 AM KAREN Leigh MD [facility]   7/1/2024  2:00 PM Shandra Carson MD [facility]

## 2024-04-23 ENCOUNTER — TELEPHONE (OUTPATIENT)
Dept: INTERNAL MEDICINE | Facility: CLINIC | Age: 49
End: 2024-04-23
Payer: MEDICAID

## 2024-04-23 NOTE — TELEPHONE ENCOUNTER
----- Message from Gabriela Combs sent at 4/22/2024  4:28 PM CDT -----  Contact: Adeel  Type:  Patient Returning Call    Who Called:Deep  Who Left Message for Patient:Barbara Groves MA  Does the patient know what this is regarding?:  Would the patient rather a call back or a response via [facility]? callback  Best Call Back Number:[phone]  Additional Information: Missed call

## 2024-04-23 NOTE — TELEPHONE ENCOUNTER
----- Message from Luzmaria Sutherland sent at 4/23/2024  8:43 AM CDT -----  Contact: Adeel  .Type:  Patient Returning Call    Who Called:Adeel  Who Left Message for Patient:nurse  Does the patient know what this is regarding?:yes  Would the patient rather a call back or a response via [facility]? Call back  Best Call Back Number:.[phone]   Additional Information: na        Thanks  MELISSA

## 2024-04-25 ENCOUNTER — TELEPHONE (OUTPATIENT)
Dept: INTERNAL MEDICINE | Facility: CLINIC | Age: 49
End: 2024-04-25
Payer: MEDICAID

## 2024-04-25 NOTE — TELEPHONE ENCOUNTER
----- Message from Paula Ervin sent at 4/25/2024 10:02 AM CDT -----  Contact: Adeel  Adeel needs a call back at  [phone], Regards to the Tirzepatide 7.5 mg/0.5 mL PnIj, Regards to its not in stock at her local pharmacy and wants to know if it can be move to the [place] pharmacy.    [facility]  [address]  [place] [address]  Phone: [phone] Fax: [phone]    Thanks  Td

## 2024-04-26 ENCOUNTER — PATIENT MESSAGE (OUTPATIENT)
Dept: PRIMARY CARE CLINIC | Facility: CLINIC | Age: 49
End: 2024-04-26
Payer: MEDICAID

## 2024-04-30 ENCOUNTER — OFFICE VISIT (OUTPATIENT)
Dept: INTERNAL MEDICINE | Facility: CLINIC | Age: 49
End: 2024-04-30
Payer: MEDICAID

## 2024-04-30 DIAGNOSIS — M79.673 CHRONIC FOOT PAIN, UNSPECIFIED LATERALITY: Primary | Chronic | ICD-10-CM

## 2024-04-30 DIAGNOSIS — E11.69 TYPE 2 DIABETES MELLITUS WITH MORBID OBESITY: Chronic | ICD-10-CM

## 2024-04-30 DIAGNOSIS — G89.29 CHRONIC FOOT PAIN, UNSPECIFIED LATERALITY: Primary | Chronic | ICD-10-CM

## 2024-04-30 DIAGNOSIS — R80.9 TYPE 2 DIABETES MELLITUS WITH MICROALBUMINURIA, WITHOUT LONG-TERM CURRENT USE OF INSULIN: Chronic | ICD-10-CM

## 2024-04-30 DIAGNOSIS — E11.29 TYPE 2 DIABETES MELLITUS WITH MICROALBUMINURIA, WITHOUT LONG-TERM CURRENT USE OF INSULIN: Chronic | ICD-10-CM

## 2024-04-30 DIAGNOSIS — E66.01 TYPE 2 DIABETES MELLITUS WITH MORBID OBESITY: Chronic | ICD-10-CM

## 2024-04-30 PROCEDURE — 4010F ACE/ARB THERAPY RXD/TAKEN: CPT | Mod: CPTII,95,, | Performed by: FAMILY MEDICINE

## 2024-04-30 PROCEDURE — 99214 OFFICE O/P EST MOD 30 MIN: CPT | Mod: 95,,, | Performed by: FAMILY MEDICINE

## 2024-04-30 PROCEDURE — 1160F RVW MEDS BY RX/DR IN RCRD: CPT | Mod: CPTII,95,, | Performed by: FAMILY MEDICINE

## 2024-04-30 PROCEDURE — 3044F HG A1C LEVEL LT 7.0%: CPT | Mod: CPTII,95,, | Performed by: FAMILY MEDICINE

## 2024-04-30 PROCEDURE — G2211 COMPLEX E/M VISIT ADD ON: HCPCS | Mod: 95,,, | Performed by: FAMILY MEDICINE

## 2024-04-30 PROCEDURE — 1159F MED LIST DOCD IN RCRD: CPT | Mod: CPTII,95,, | Performed by: FAMILY MEDICINE

## 2024-04-30 NOTE — PROGRESS NOTES
TELEMEDICINE VIRTUAL VIDEO VISIT  4/30/24  7:20 AM CDT    Visit Type: Audiovisual    Patient's Location: Adeel represents that they are located within the state of [place].    History of Present Illness    Adeel presents today for foot pain and medication issues.    Adeel reports ongoing foot pain but has been unable to identify a podiatrist with timely appointment available. Self-care practices, such as soaking feet in baking soda, Epsom salt, and Syrian bea, have provided some relief. She believes the pain could be related to flat foot arch and inadequate arch support in shoes. Refer to Patient Letter dated today (04/30/2024) for education/instructions provided to Adeel.      Adeel indicates difficulty in obtaining her Mounjaro medication due to it being out of stock at the pharmacy. She intends to refill her medications at the [facility], where her Mounjaro medication may be available. She also recalls a previous prescription for Ibuprofen 800, which she notes is not on her current medication list. She denies any issues with the task of refilling medications.       Review of Systems   Respiratory:  Negative for chest tightness and shortness of breath.    Cardiovascular:  Negative for chest pain.   Endocrine: Negative for polydipsia and polyuria.       Assessment & Plan     Referred the patient to a podiatrist for further evaluation and treatment of her persistent foot pain.   Provided the patient with a printed referral order and instructions on how to locate a suitable provider.   Educated the patient on the necessity of contacting her health insurance to arrange an appointment with the podiatrist.   Explained that the foot pain could be attributed to the flat arch of her foot and inadequate arch support in her footwear.   Suggested OTC pain relief medications such as ibuprofen or naproxen for musculoskeletal discomfort.   Informed the patient that a double dose of these OTC medications equates  to a prescription dose.   Transferred the patient's prescription for Mounjaro back to the [facility] pharmacy due to supply constraints at [facility].   Scheduled a follow-up visit for the patient in June.       1. Chronic foot pain, bilateral    2. Type 2 diabetes mellitus with microalbuminuria, without long-term current use of insulin  -     tirzepatide 7.5 mg/0.5 mL PnIj; Inject 7.5 mg into the skin every 7 days.  Dispense: 4 Pen; Refill: 5    3. Morbid obesity complicating type 2 diabetes mellitus  -     tirzepatide 7.5 mg/0.5 mL PnIj; Inject 7.5 mg into the skin every 7 days.  Dispense: 4 Pen; Refill: 5    No other significant complaints or concerns were reported.  Refer to Patient Letter dated today (04/30/2024) for additional education/instructions provided.  Today's visit involved the intricate management of episodic problem(s) and the ongoing care for the patient's serious or complex condition(s) listed above, reflecting the inherent complexity of providing longitudinal, comprehensive evaluation and management as the central hub for the patient's primary care services.  There were no vitals filed for this visit.  Physical Exam    Constitutional: No acute distress. Normal appearance. Not ill-appearing.  Pulmonary: Pulmonary effort is normal. No respiratory distress.  Skin: Skin is not jaundiced.  Musculoskeletal: FLAT ARCH OF FOOT.  Neurological: Alert. Mental status is at baseline.  Psychiatric: Mood normal. Behavior normal. Thought content normal.         This note was generated with the assistance of ambient listening technology. Verbal consent was obtained by the patient and accompanying visitor(s) for the recording of patient appointment to facilitate this note. I attest to having reviewed and edited the generated note for accuracy, though some syntax or spelling errors may persist. Please contact the author of this note for any clarification.      TOTAL TIME evaluating and managing this patient for this  "encounter was greater than or equal to 11 minutes.  This time was exclusive of any separately billable procedures for this patient and exclusive of time spent treating any other patient.    Documentation entered by me for this encounter may have been done in part using speech-recognition technology. Although I have made an effort to ensure accuracy, "sound like" errors may exist and should be interpreted in context.    Each patient to whom medical services are provided by telemedicine is: (1) informed of the relationship between the physician and patient and the respective role of any other health care provider with respect to management of the patient; and (2) notified that he or she may decline to receive medical services by telemedicine and may withdraw from such care at any time.   "

## 2024-04-30 NOTE — LETTER
04/30/2024        ADELINA EASON   [address] [place], [place] [address] [address]        Dear Adelina,    Here is the referral order for you to see a podiatrist.    I'm unaware of which podiatry offices accept your insurance and have a timely appointment.    WHAT TO DO NOW:  Call the customer support number on your health insurance card or visit their website to chat with one of their customer service representatives.  Tell them that I referred you to a podiatrist, and you need their help identifying a local podiatrist who accepts your insurance and has a timely appointment available.  Call the podiatrist office of your choice to schedule your appointment.  Take the accompanying referral order with you when you go to your appointment.  Tell them that they can contact me directly if they need additional information.    Sincerely,    DAISY Leigh MD      ENCLOSURE: Referral Order                  REFERRAL ORDERS    PATIENT IDENTIFYING INFORMATION:  ADELINA EASON  [address] [place], [place] [address] [address] YOB: 1975  OUR MRN: [id number]   Home Phone [phone]   Work Phone Not on file.   Mobile [phone]        ORDER DATE: 04/30/2024    REFERRED TO: podiatrist    SUPPORTING DIAGNOSES    ICD-10-CM ICD-9-CM   1. Chronic foot pain, bilateral  M79.673 729.5    G89.29 338.29          DAISY Leigh MD, NPI: [id number]

## 2024-06-24 DIAGNOSIS — I15.2 HYPERTENSION COMPLICATING DIABETES: Chronic | ICD-10-CM

## 2024-06-24 DIAGNOSIS — R80.9 TYPE 2 DIABETES MELLITUS WITH MICROALBUMINURIA, WITHOUT LONG-TERM CURRENT USE OF INSULIN: Chronic | ICD-10-CM

## 2024-06-24 DIAGNOSIS — E11.29 TYPE 2 DIABETES MELLITUS WITH MICROALBUMINURIA, WITHOUT LONG-TERM CURRENT USE OF INSULIN: Chronic | ICD-10-CM

## 2024-06-24 DIAGNOSIS — E11.59 HYPERTENSION COMPLICATING DIABETES: Chronic | ICD-10-CM

## 2024-06-24 RX ORDER — AMLODIPINE AND VALSARTAN 10; 320 MG/1; MG/1
1 TABLET ORAL DAILY
Qty: 90 TABLET | Refills: 2 | Status: SHIPPED | OUTPATIENT
Start: 2024-06-24 | End: 2024-06-27 | Stop reason: SDUPTHER

## 2024-06-24 NOTE — TELEPHONE ENCOUNTER
Care Due:                  Date            Visit Type   Department     Provider  --------------------------------------------------------------------------------                                ESTABLISHED                              PATIENT -    [facility]  Last Visit: 04-      [facility]      MEDICINE       Roberth Leigh  Next Visit: None Scheduled  None         None Found                                                            Last  Test          Frequency    Reason                     Performed    Due Date  --------------------------------------------------------------------------------    HBA1C.......  6 months...  metFORMIN, tirzepatide...  02- 08-    [facility] Embedded Care Due Messages. Reference number: [id number].   6/24/2024 6:11:48 AM CDT

## 2024-06-24 NOTE — TELEPHONE ENCOUNTER
Provider Staff:  Action required for this patient    Requires labs      Please see care gap opportunities below in Care Due Message.    Thanks!  [facility]     Appointments      Date Provider   Last Visit   4/30/2024 KAREN Leigh MD   Next Visit   6/27/2024 KAREN Leigh MD     Refill Decision Note   Adeel Gordon  is requesting a refill authorization.  Brief Assessment and Rationale for Refill:  Approve     Medication Therapy Plan:         Comments:     Note composed:10:31 AM 06/24/2024

## 2024-06-27 ENCOUNTER — TELEPHONE (OUTPATIENT)
Dept: DIABETES | Facility: CLINIC | Age: 49
End: 2024-06-27
Payer: MEDICAID

## 2024-06-27 ENCOUNTER — OFFICE VISIT (OUTPATIENT)
Dept: INTERNAL MEDICINE | Facility: CLINIC | Age: 49
End: 2024-06-27
Payer: MEDICAID

## 2024-06-27 VITALS
OXYGEN SATURATION: 96 % | DIASTOLIC BLOOD PRESSURE: 86 MMHG | SYSTOLIC BLOOD PRESSURE: 110 MMHG | RESPIRATION RATE: 18 BRPM | WEIGHT: 213.88 LBS | BODY MASS INDEX: 35.63 KG/M2 | HEIGHT: 65 IN | HEART RATE: 109 BPM | TEMPERATURE: 97 F

## 2024-06-27 DIAGNOSIS — E11.9 DIABETIC EYE EXAM: ICD-10-CM

## 2024-06-27 DIAGNOSIS — E66.9 DIABETES MELLITUS TYPE 2 IN OBESE: Chronic | ICD-10-CM

## 2024-06-27 DIAGNOSIS — E78.5 HYPERLIPIDEMIA ASSOCIATED WITH TYPE 2 DIABETES MELLITUS: Chronic | ICD-10-CM

## 2024-06-27 DIAGNOSIS — K59.09 OTHER CONSTIPATION: ICD-10-CM

## 2024-06-27 DIAGNOSIS — E11.29 TYPE 2 DIABETES MELLITUS WITH MICROALBUMINURIA, WITHOUT LONG-TERM CURRENT USE OF INSULIN: Primary | Chronic | ICD-10-CM

## 2024-06-27 DIAGNOSIS — E66.01 TYPE 2 DIABETES MELLITUS WITH MORBID OBESITY: Chronic | ICD-10-CM

## 2024-06-27 DIAGNOSIS — E11.69 HYPERLIPIDEMIA ASSOCIATED WITH TYPE 2 DIABETES MELLITUS: Chronic | ICD-10-CM

## 2024-06-27 DIAGNOSIS — E11.59 HYPERTENSION COMPLICATING DIABETES: Chronic | ICD-10-CM

## 2024-06-27 DIAGNOSIS — I15.2 HYPERTENSION COMPLICATING DIABETES: Chronic | ICD-10-CM

## 2024-06-27 DIAGNOSIS — R80.9 TYPE 2 DIABETES MELLITUS WITH MICROALBUMINURIA, WITHOUT LONG-TERM CURRENT USE OF INSULIN: Primary | Chronic | ICD-10-CM

## 2024-06-27 DIAGNOSIS — E11.69 DIABETES MELLITUS TYPE 2 IN OBESE: Chronic | ICD-10-CM

## 2024-06-27 DIAGNOSIS — E11.69 TYPE 2 DIABETES MELLITUS WITH MORBID OBESITY: Chronic | ICD-10-CM

## 2024-06-27 DIAGNOSIS — Z01.00 DIABETIC EYE EXAM: ICD-10-CM

## 2024-06-27 LAB
ALBUMIN SERPL BCP-MCNC: 4.1 G/DL (ref 3.5–5.2)
ALBUMIN/CREAT UR: 15.6 UG/MG (ref 0–30)
ALP SERPL-CCNC: 68 U/L (ref 55–135)
ALT SERPL W/O P-5'-P-CCNC: 14 U/L (ref 10–44)
ANION GAP SERPL CALC-SCNC: 9 MMOL/L (ref 8–16)
AST SERPL-CCNC: 13 U/L (ref 10–40)
BILIRUB SERPL-MCNC: 0.7 MG/DL (ref 0.1–1)
BUN SERPL-MCNC: 10 MG/DL (ref 6–20)
CALCIUM SERPL-MCNC: 10.4 MG/DL (ref 8.7–10.5)
CHLORIDE SERPL-SCNC: 107 MMOL/L (ref 95–110)
CHOLEST SERPL-MCNC: 213 MG/DL (ref 120–199)
CHOLEST/HDLC SERPL: 5.9 {RATIO} (ref 2–5)
CO2 SERPL-SCNC: 25 MMOL/L (ref 23–29)
CREAT SERPL-MCNC: 0.9 MG/DL (ref 0.5–1.4)
CREAT UR-MCNC: 262 MG/DL (ref 15–325)
EST. GFR  (NO RACE VARIABLE): >60 ML/MIN/1.73 M^2
ESTIMATED AVG GLUCOSE: 94 MG/DL (ref 68–131)
GLUCOSE SERPL-MCNC: 82 MG/DL (ref 70–110)
HBA1C MFR BLD: 4.9 % (ref 4–5.6)
HDLC SERPL-MCNC: 36 MG/DL (ref 40–75)
HDLC SERPL: 16.9 % (ref 20–50)
LDLC SERPL CALC-MCNC: 140.4 MG/DL (ref 63–159)
MICROALBUMIN UR DL<=1MG/L-MCNC: 41 UG/ML
NONHDLC SERPL-MCNC: 177 MG/DL
POTASSIUM SERPL-SCNC: 4.1 MMOL/L (ref 3.5–5.1)
PROT SERPL-MCNC: 7.4 G/DL (ref 6–8.4)
SODIUM SERPL-SCNC: 141 MMOL/L (ref 136–145)
TRIGL SERPL-MCNC: 183 MG/DL (ref 30–150)

## 2024-06-27 PROCEDURE — 80061 LIPID PANEL: CPT | Performed by: FAMILY MEDICINE

## 2024-06-27 PROCEDURE — 3074F SYST BP LT 130 MM HG: CPT | Mod: CPTII,,, | Performed by: FAMILY MEDICINE

## 2024-06-27 PROCEDURE — 99215 OFFICE O/P EST HI 40 MIN: CPT | Mod: PBBFAC | Performed by: FAMILY MEDICINE

## 2024-06-27 PROCEDURE — 4010F ACE/ARB THERAPY RXD/TAKEN: CPT | Mod: CPTII,,, | Performed by: FAMILY MEDICINE

## 2024-06-27 PROCEDURE — G2211 COMPLEX E/M VISIT ADD ON: HCPCS | Mod: S$PBB,,, | Performed by: FAMILY MEDICINE

## 2024-06-27 PROCEDURE — 83036 HEMOGLOBIN GLYCOSYLATED A1C: CPT | Performed by: FAMILY MEDICINE

## 2024-06-27 PROCEDURE — 3079F DIAST BP 80-89 MM HG: CPT | Mod: CPTII,,, | Performed by: FAMILY MEDICINE

## 2024-06-27 PROCEDURE — 99999 PR PBB SHADOW E&M-EST. PATIENT-LVL V: CPT | Mod: PBBFAC,,, | Performed by: FAMILY MEDICINE

## 2024-06-27 PROCEDURE — 3061F NEG MICROALBUMINURIA REV: CPT | Mod: CPTII,,, | Performed by: FAMILY MEDICINE

## 2024-06-27 PROCEDURE — 1160F RVW MEDS BY RX/DR IN RCRD: CPT | Mod: CPTII,,, | Performed by: FAMILY MEDICINE

## 2024-06-27 PROCEDURE — 1159F MED LIST DOCD IN RCRD: CPT | Mod: CPTII,,, | Performed by: FAMILY MEDICINE

## 2024-06-27 PROCEDURE — 3008F BODY MASS INDEX DOCD: CPT | Mod: CPTII,,, | Performed by: FAMILY MEDICINE

## 2024-06-27 PROCEDURE — 3044F HG A1C LEVEL LT 7.0%: CPT | Mod: CPTII,,, | Performed by: FAMILY MEDICINE

## 2024-06-27 PROCEDURE — 99214 OFFICE O/P EST MOD 30 MIN: CPT | Mod: S$PBB,,, | Performed by: FAMILY MEDICINE

## 2024-06-27 PROCEDURE — 3066F NEPHROPATHY DOC TX: CPT | Mod: CPTII,,, | Performed by: FAMILY MEDICINE

## 2024-06-27 PROCEDURE — 82043 UR ALBUMIN QUANTITATIVE: CPT | Performed by: FAMILY MEDICINE

## 2024-06-27 PROCEDURE — 80053 COMPREHEN METABOLIC PANEL: CPT | Performed by: FAMILY MEDICINE

## 2024-06-27 RX ORDER — PREGABALIN 75 MG/1
CAPSULE ORAL
COMMUNITY
Start: 2024-05-15 | End: 2024-06-27

## 2024-06-27 RX ORDER — TIZANIDINE 4 MG/1
TABLET ORAL
COMMUNITY
Start: 2024-05-15 | End: 2024-06-27

## 2024-06-27 RX ORDER — CELECOXIB 100 MG/1
CAPSULE ORAL
COMMUNITY
Start: 2024-01-22 | End: 2024-06-27

## 2024-06-27 RX ORDER — MELOXICAM 15 MG/1
15 TABLET ORAL
COMMUNITY
Start: 2024-05-15 | End: 2024-06-27

## 2024-06-27 RX ORDER — ATORVASTATIN CALCIUM 20 MG/1
20 TABLET, FILM COATED ORAL NIGHTLY
Qty: 90 TABLET | Refills: 3 | Status: SHIPPED | OUTPATIENT
Start: 2024-06-27 | End: 2025-06-27

## 2024-06-27 RX ORDER — AMLODIPINE AND VALSARTAN 10; 320 MG/1; MG/1
1 TABLET ORAL DAILY
Qty: 90 TABLET | Refills: 2 | Status: SHIPPED | OUTPATIENT
Start: 2024-06-27

## 2024-06-27 RX ORDER — GABAPENTIN 300 MG/1
CAPSULE ORAL
COMMUNITY
Start: 2024-02-18 | End: 2024-06-27

## 2024-06-27 NOTE — ASSESSMENT & PLAN NOTE
"Wt Readings from Last 6 Encounters:   06/27/24 97 kg (213 lb 13.5 oz)   03/25/24 107.2 kg (236 lb 5.3 oz)   12/20/23 114.8 kg (253 lb)   11/20/23 114.9 kg (253 lb 4.9 oz)   08/11/23 113.4 kg (250 lb)   04/18/23 112.4 kg (247 lb 12.8 oz)     Estimated body mass index is 35.59 kg/m² as calculated from the following:    Height as of this encounter: 5' 5" (1.651 m).    Weight as of this encounter: 97 kg (213 lb 13.5 oz).    "

## 2024-06-27 NOTE — ASSESSMENT & PLAN NOTE
Lab Results   Component Value Date    HGBA1C 5.7 (H) 02/27/2024    HGBA1C 7.9 (H) 11/28/2023    HGBA1C 5.7 (H) 02/20/2023    EGFRNORACEVR >60.0 02/27/2024    MICALBCREAT 229.5 (H) 12/07/2023    LDLCALC 115.2 11/28/2023

## 2024-06-27 NOTE — PROGRESS NOTES
OFFICE VISIT 6/27/24  9:40 AM CDT    History of Present Illness    Adeel presents today for follow up.    She is currently taking Mounjaro and Terzepet. She stopped taking Lyrica, gabapentin, meloxicam, and Celebrex due to headaches and nausea. Metformin is causing GI side effects. Zofran prescribed for nausea is ineffective. Based on pharmacy records, she may have missed some doses of her blood pressure medication.    She reports significant weight loss of over 40 lbs in the last 7 months and desires to continue losing more weight. She denies any negative symptoms related to the weight loss.    She reports a high fat, high carbohydrate diet lacking in fiber. Yesterday she consumed grits, eggs, hash browns, sausage, watermelon, and a small sloppy cherri sandwich. She denies liking high fiber cereals or milk but likes oranges, recognizing they are high in sugar. She is open to guidance from a dietitian to improve her dietary choices and increase fiber for better bowel health and constipation management.    She received initial diabetes education from dietician Kady Black in December when first diagnosed. A 2.5 week follow-up was planned but did not occur. She was satisfied with the one-hour session but has not participated in a formal diabetes education program.    She reports constant aching or burning pain in both heels for several months, right worse than left. It is exacerbated by weight-bearing activities but can occur at rest or while driving. There is no recent trauma. Pain has persisted since the beginning of this year despite conservative measures.    She experiences belching, acid reflux, and constipation but denies burning indigestion.    She was previously set up for home digital blood pressure monitoring but experienced technical difficulties.       Review of Systems: Except as noted herein, ROS is otherwise negative.     Assessment & Plan     Patient has lost significant weight (over 40 lbs in the  last 7 months) and BMI has decreased from morbid obesity range to class 2 obesity range. Encouraged continued weight loss efforts with current Mounjaro dose.   Discontinued metformin due to reported GI side effects. Provided rationale to insurance for coverage of Mounjaro given metformin intolerance.   Suspect dietary factors contributing to patient's reported belching, acid reflux, bloating and constipation.   Blood pressure has improved but still not at goal. Suspect medication non-adherence based on refill history, but patient states has been taking medications from different pharmacy.  M79.672 PAIN IN LEFT FOOT:   Referred the patient to podiatry for evaluation of ongoing foot pain in the left foot.   Advised the patient to keep the upcoming podiatry appointment in July.   The patient reports pain in the heel, while standing, and while driving.   The pain has been going on for a while, maybe since the beginning of the year, and is present mainly in the right foot but also in the left foot.   Examination revealed normal sensation in the feet.   Recommend ibuprofen for the foot pain.  M79.671 PAIN IN RIGHT FOOT:   Referred the patient to podiatry for evaluation of ongoing foot pain, mainly in the right foot.   Advised the patient to keep the upcoming podiatry appointment in July.   The patient reports pain in the heel, while standing, and while driving.   The pain has been going on for a while, maybe since the beginning of the year.   Examination revealed normal sensation in the feet.   Recommend ibuprofen for the foot pain.  K59.00 CONSTIPATION, UNSPECIFIED:   The patient reports feeling constipated.   Recommend improving diet and increasing fiber intake to treat GI symptoms and constipation.   Advised the patient to see a dietician to improve dietary choices.  E66.01 MORBID (SEVERE) OBESITY DUE TO EXCESS CALORIES:   The patient has lost over 40 lbs in the last 7 months.   The patient wants to lose more weight  but feels at a standstill in the last couple of months.   The patient asks about increasing Mounjaro dosage.   Evaluation shows the patient's BMI has decreased from the morbid obesity range to 35.6.   Advised continuing the current plan since the patient is doing well with weight loss.   Refilled Mounjaro at the current 7.5 mg dose.  Z79.84 LONG TERM (CURRENT) USE OF ORAL HYPOGLYCEMIC DRUGS:   Discontinued metformin due to GI side effects reported by the patient.   The patient is taking Mounjaro for diabetes and weight loss.   Documented metformin intolerance to justify continuing Mounjaro.   Refilled Mounjaro and discontinued metformin.  K21.9 GASTRO-ESOPHAGEAL REFLUX DISEASE WITHOUT ESOPHAGITIS:   The patient reports a lot of acid reflux and belching, even when waking up before eating/drinking anything.   The belching has been occurring since before starting current medications.   Recommend improving diet to treat GI symptoms.   Advised the patient to see a dietician.  E11.9 TYPE 2 DIABETES MELLITUS WITHOUT COMPLICATIONS:   Discussed the rationale for monitoring diabetes with regular A1c testing and eye exams to screen for complications.   Ordered A1c and lipid panel to be completed today.   Ordered A1c and lipid panel for late November.   Scheduled a follow-up video visit for a few days after the patient's late November lab work to review results.   The patient is due for diabetes labs including A1c and has not had a diabetic eye exam yet.   Advised the patient to get a diabetic eye exam.   Restarted atorvastatin for heart health in this diabetic patient.  Z68.35 BODY MASS INDEX [BMI]   35.0-35.9, ADULT:   The patient's weight loss is over 40 lbs in the last 7 months.   Evaluation shows the patient's BMI is 35.6, decreased from the morbid obesity range.  LIFESTYLE CHANGES:   Educated on importance of high-fiber diet for GI health and alleviating constipation.   Reviewed examples of high vs low fiber foods.    "Explained importance of medication adherence for blood pressure control.   Discontinued Zofran as patient reported it was not effective for nausea.   Continued Mounjaro 7.5 mg and provided refill.   Restarted atorvastatin for cardiovascular risk reduction in setting of diabetes.       1. Type 2 diabetes mellitus with microalbuminuria, without long-term current use of insulin  Overview:  Metformin caused GI side-effects.    Assessment & Plan:  Lab Results   Component Value Date    HGBA1C 5.7 (H) 02/27/2024    HGBA1C 7.9 (H) 11/28/2023    HGBA1C 5.7 (H) 02/20/2023    EGFRNORACEVR >60.0 02/27/2024    MICALBCREAT 229.5 (H) 12/07/2023    LDLCALC 115.2 11/28/2023        Orders:  -     Ambulatory referral/consult to Diabetes Education; Future; Expected date: 07/04/2024  -     Hemoglobin A1C; Standing  -     Comprehensive Metabolic Panel; Standing  -     Lipid Panel; Standing  -     Microalbumin/Creatinine Ratio, Urine; Standing  -     amlodipine-valsartan (EXFORGE)  mg per tablet; Take 1 tablet by mouth once daily.  Dispense: 90 tablet; Refill: 2  -     tirzepatide 7.5 mg/0.5 mL PnIj; Inject 7.5 mg into the skin every 7 days.  Dispense: 4 Pen; Refill: 4    2. Diabetes mellitus type 2 in obese  Assessment & Plan:  Wt Readings from Last 6 Encounters:   06/27/24 97 kg (213 lb 13.5 oz)   03/25/24 107.2 kg (236 lb 5.3 oz)   12/20/23 114.8 kg (253 lb)   11/20/23 114.9 kg (253 lb 4.9 oz)   08/11/23 113.4 kg (250 lb)   04/18/23 112.4 kg (247 lb 12.8 oz)     Estimated body mass index is 35.59 kg/m² as calculated from the following:    Height as of this encounter: 5' 5" (1.651 m).    Weight as of this encounter: 97 kg (213 lb 13.5 oz).      Orders:  -     Ambulatory referral/consult to Diabetes Education; Future; Expected date: 07/04/2024    3. Other constipation    4. Hyperlipidemia complicating type 2 diabetes mellitus  -     Comprehensive Metabolic Panel; Standing  -     Lipid Panel; Standing  -     atorvastatin (LIPITOR) " "20 MG tablet; Take 1 tablet (20 mg total) by mouth every evening. (for cholesterol and heart health)  Dispense: 90 tablet; Refill: 3    5. Hypertension complicating diabetes  -     Comprehensive Metabolic Panel; Standing  -     Lipid Panel; Standing  -     amlodipine-valsartan (EXFORGE)  mg per tablet; Take 1 tablet by mouth once daily.  Dispense: 90 tablet; Refill: 2    6. Diabetic eye exam  -     Ambulatory referral/consult to Ophthalmology; Future; Expected date: 07/04/2024    7. Morbid obesity complicating type 2 diabetes mellitus  -     tirzepatide 7.5 mg/0.5 mL PnIj; Inject 7.5 mg into the skin every 7 days.  Dispense: 4 Pen; Refill: 4    No other significant complaints or concerns were reported.    Today's visit involved the intricate management of episodic problem(s) and the ongoing care for the patient's serious or complex condition(s) listed above, reflecting the inherent complexity of providing longitudinal, comprehensive evaluation and management as the central hub for the patient's primary care services.  Vitals:    06/27/24 0939   BP: 110/86   BP Location: Right arm   Patient Position: Sitting   BP Method: Large (Manual)   Pulse: 109   Resp: 18   Temp: 97.2 °F (36.2 °C)   SpO2: 96%   Weight: 97 kg (213 lb 13.5 oz)   Height: 5' 5" (1.651 m)   Physical Exam  Vitals reviewed.   Constitutional:       General: She is not in acute distress.     Appearance: Normal appearance. She is not ill-appearing or diaphoretic.   Cardiovascular:      Rate and Rhythm: Normal rate and regular rhythm.   Pulmonary:      Effort: Pulmonary effort is normal.      Breath sounds: Normal breath sounds.   Skin:     General: Skin is warm and dry.   Neurological:      Mental Status: She is alert and oriented to person, place, and time. Mental status is at baseline.   Psychiatric:         Mood and Affect: Mood normal.         Behavior: Behavior normal.         Judgment: Judgment normal.       DIABETIC FOOT EXAM:  Protective " "Sensation (w/ 10 gram monofilament):  Right: Intact  Left: Intact    Visual Inspection:  Normal -  Bilateral    Pedal Pulses:   Right: Present  Left: Present    Posterior Tibialis Pulses:   Right:Present  Left: Present       This note was generated with the assistance of ambient listening technology. Verbal consent was obtained by the patient and accompanying visitor(s) for the recording of patient appointment to facilitate this note. I attest to having reviewed and edited the generated note for accuracy, though some syntax or spelling errors may persist. Please contact the author of this note for any clarification.    Documentation entered by me for this encounter may have been done in part using speech-recognition technology. Although I have made an effort to ensure accuracy, "sound like" errors may exist and should be interpreted in context.   "

## 2024-09-19 NOTE — PROGRESS NOTES
CC: Well woman exam    Adeel Gordon is a 43 y.o. female  presents for well woman exam.  LMP: No LMP recorded. Patient has had an ablation..  No issues, problems, or complaints.      Past Medical History:   Diagnosis Date    Class 2 severe obesity due to excess calories with serious comorbidity and body mass index (BMI) of 38.0 to 38.9 in adult 12/3/2015    Depression     not taking meds     History of endometrial ablation 2017    Hypertension     Obesity     S/P tubal ligation 2017    Tobacco use disorder      Past Surgical History:   Procedure Laterality Date    ENDOMETRIAL ABLATION  2017    TUBAL LIGATION  2017    VAGINAL DELIVERY      x 3     Social History     Socioeconomic History    Marital status:      Spouse name: Not on file    Number of children: 3    Years of education: Not on file    Highest education level: Not on file   Social Needs    Financial resource strain: Not on file    Food insecurity - worry: Not on file    Food insecurity - inability: Not on file    Transportation needs - medical: Not on file    Transportation needs - non-medical: Not on file   Occupational History    Occupation:      Employer: [employer]     Comment: [employer]   Tobacco Use    Smoking status: Current Some Day Smoker     Packs/day: 0.50     Years: 20.00     Pack years: 10.00    Smokeless tobacco: Never Used   Substance and Sexual Activity    Alcohol use: No     Alcohol/week: 0.0 oz    Drug use: No    Sexual activity: Yes     Partners: Male     Birth control/protection: None   Other Topics Concern    Not on file   Social History Narrative    Not on file     Family History   Problem Relation Age of Onset    No Known Problems Sister     No Known Problems Sister     No Known Problems Brother     Kidney disease Son     Hypertension Son     Cancer Father         THROAT    Hypertension Father     Hypertension  "Mother      OB History      Para Term  AB Living    3 3 3     3    SAB TAB Ectopic Multiple Live Births            3          /86   Ht 5' 6" (1.676 m)   Wt 105.3 kg (232 lb 0.6 oz)   BMI 37.45 kg/m²       ROS:  GENERAL: Denies weight gain or weight loss. Feeling well overall.   SKIN: Denies rash or lesions.   HEAD: Denies head injury or headache.   NODES: Denies enlarged lymph nodes.   CHEST: Denies chest pain or shortness of breath.   CARDIOVASCULAR: Denies palpitations or left sided chest pain.   ABDOMEN: No abdominal pain, constipation, diarrhea, nausea, vomiting or rectal bleeding.   URINARY: No frequency, dysuria, hematuria, or burning on urination.  REPRODUCTIVE: See HPI.   BREASTS: The patient performs breast self-examination and denies pain, lumps, or nipple discharge.   HEMATOLOGIC: No easy bruisability or excessive bleeding.   MUSCULOSKELETAL: Denies joint pain or swelling.   NEUROLOGIC: Denies syncope or weakness.   PSYCHIATRIC: Denies depression, anxiety or mood swings.    PHYSICAL EXAM:  APPEARANCE: Well nourished, well developed, in no acute distress.  AFFECT: WNL, alert and oriented x 3  SKIN: No acne or hirsutism  NECK: Neck symmetric without masses or thyromegaly  NODES: No inguinal, cervical, axillary, or femoral lymph node enlargement  CHEST: Good respiratory effect  ABDOMEN: Soft.  No tenderness or masses.  No hepatosplenomegaly.  No hernias.  BREASTS: Symmetrical, no skin changes or visible lesions.  No palpable masses, nipple discharge bilaterally.  PELVIC: Normal external genitalia without lesions.  Normal hair distribution.  Adequate perineal body, normal urethral meatus.  Vagina moist and well rugated without lesions or discharge.  Cervix pink, without lesions, discharge or tenderness.  No significant cystocele or rectocele.  Bimanual exam shows uterus to be normal size, regular, mobile and nontender.  Adnexa without masses or tenderness.    EXTREMITIES: No " edema.  Physical Exam    1. Encounter for gynecological examination without abnormal finding  Liquid-based pap smear, screening    AND PLAN:    Patient was counseled today on A.C.S. Pap guidelines and recommendations for yearly pelvic exams, mammograms and monthly self breast exams; to see her PCP for other health maintenance.                    No Statement Selected

## 2024-09-20 ENCOUNTER — OFFICE VISIT (OUTPATIENT)
Dept: OPHTHALMOLOGY | Facility: CLINIC | Age: 49
End: 2024-09-20
Payer: MEDICAID

## 2024-09-20 DIAGNOSIS — H35.89 RPE MOTTLING OF MACULA: ICD-10-CM

## 2024-09-20 DIAGNOSIS — E11.9 TYPE 2 DIABETES MELLITUS WITHOUT COMPLICATION, WITHOUT LONG-TERM CURRENT USE OF INSULIN: Primary | ICD-10-CM

## 2024-09-20 PROCEDURE — 99999 PR PBB SHADOW E&M-EST. PATIENT-LVL II: CPT | Mod: PBBFAC,,, | Performed by: STUDENT IN AN ORGANIZED HEALTH CARE EDUCATION/TRAINING PROGRAM

## 2024-09-20 PROCEDURE — 99212 OFFICE O/P EST SF 10 MIN: CPT | Mod: PBBFAC,PO | Performed by: STUDENT IN AN ORGANIZED HEALTH CARE EDUCATION/TRAINING PROGRAM

## 2024-09-20 NOTE — PROGRESS NOTES
HPI     Diabetes     Additional comments: annual           Comments    Stable vision for her distance and she wears a +1.50 for her readers.   States that her eyes shake when looking at things.          Last edited by Sue Ordoñez on 9/20/2024  9:30 AM.            Assessment /Plan     For exam results, see Encounter Report.    Type 2 diabetes mellitus without complication, without long-term current use of insulin  - last A1c 4.9   Strict BG control, f/u w/ PCP, and annual DFE  Stressed importance of DM control to preserve vision    RPE mottling of macula- No MOCT avaliable at [facility],   Will have patient return for MOCT       Return to clinic in 1M for MOCT and IOP

## 2024-10-15 ENCOUNTER — TELEPHONE (OUTPATIENT)
Dept: OBSTETRICS AND GYNECOLOGY | Facility: CLINIC | Age: 49
End: 2024-10-15
Payer: MEDICAID

## 2024-10-15 NOTE — TELEPHONE ENCOUNTER
----- Message from Summer sent at 10/15/2024  2:53 PM CDT -----  Contact: Adeel Denis is needing a call back to schedule her pap smear. Please call back at [phone].

## 2024-10-25 ENCOUNTER — OFFICE VISIT (OUTPATIENT)
Dept: OPHTHALMOLOGY | Facility: CLINIC | Age: 49
End: 2024-10-25
Payer: MEDICAID

## 2024-10-25 DIAGNOSIS — H35.89 RPE MOTTLING OF MACULA: ICD-10-CM

## 2024-10-25 DIAGNOSIS — E11.9 TYPE 2 DIABETES MELLITUS WITHOUT COMPLICATION, WITHOUT LONG-TERM CURRENT USE OF INSULIN: Primary | ICD-10-CM

## 2024-10-25 PROCEDURE — 99212 OFFICE O/P EST SF 10 MIN: CPT | Mod: PBBFAC | Performed by: STUDENT IN AN ORGANIZED HEALTH CARE EDUCATION/TRAINING PROGRAM

## 2024-10-25 PROCEDURE — 99999 PR PBB SHADOW E&M-EST. PATIENT-LVL II: CPT | Mod: PBBFAC,,, | Performed by: STUDENT IN AN ORGANIZED HEALTH CARE EDUCATION/TRAINING PROGRAM

## 2024-10-25 NOTE — PROGRESS NOTES
HPI     RPE mottling of macula     Additional comments: Pt here for MOCT and IOP check.           Comments    Pt states no problems since her last visit. She still have trouble seeing   lines at night when driving. No pain or irritation. Not using any drops.   Want to get a prescription for readers.           Last edited by Pedrito Chang MA on 10/25/2024  8:08 AM.            Assessment /Plan     For exam results, see Encounter Report.    Type 2 diabetes mellitus without complication, without long-term current use of insulin  RPE mottling of macula  -   Stable, no macular edema present on MOCT today      Return to clinic in 1 year DFE or PRN

## 2024-11-14 ENCOUNTER — LAB VISIT (OUTPATIENT)
Dept: LAB | Facility: HOSPITAL | Age: 49
End: 2024-11-14
Attending: FAMILY MEDICINE
Payer: MEDICAID

## 2024-11-14 DIAGNOSIS — R80.9 TYPE 2 DIABETES MELLITUS WITH MICROALBUMINURIA, WITHOUT LONG-TERM CURRENT USE OF INSULIN: Chronic | ICD-10-CM

## 2024-11-14 DIAGNOSIS — E11.29 TYPE 2 DIABETES MELLITUS WITH MICROALBUMINURIA, WITHOUT LONG-TERM CURRENT USE OF INSULIN: Chronic | ICD-10-CM

## 2024-11-14 LAB
ALBUMIN/CREAT UR: 15.8 UG/MG (ref 0–30)
CREAT UR-MCNC: 120 MG/DL (ref 15–325)
MICROALBUMIN UR DL<=1MG/L-MCNC: 19 UG/ML

## 2024-11-14 PROCEDURE — 82043 UR ALBUMIN QUANTITATIVE: CPT | Performed by: FAMILY MEDICINE

## 2024-11-20 ENCOUNTER — TELEPHONE (OUTPATIENT)
Dept: INTERNAL MEDICINE | Facility: CLINIC | Age: 49
End: 2024-11-20

## 2024-11-20 ENCOUNTER — OFFICE VISIT (OUTPATIENT)
Dept: INTERNAL MEDICINE | Facility: CLINIC | Age: 49
End: 2024-11-20
Payer: MEDICAID

## 2024-11-20 DIAGNOSIS — I10 HYPERTENSION COMPLICATING DIABETES: Chronic | ICD-10-CM

## 2024-11-20 DIAGNOSIS — E11.29 TYPE 2 DIABETES MELLITUS WITH MICROALBUMINURIA, WITHOUT LONG-TERM CURRENT USE OF INSULIN: Chronic | ICD-10-CM

## 2024-11-20 DIAGNOSIS — E78.5 HYPERLIPIDEMIA ASSOCIATED WITH TYPE 2 DIABETES MELLITUS: Chronic | ICD-10-CM

## 2024-11-20 DIAGNOSIS — E11.69 DIABETES MELLITUS TYPE 2 IN OBESE: Primary | Chronic | ICD-10-CM

## 2024-11-20 DIAGNOSIS — E66.9 DIABETES MELLITUS TYPE 2 IN OBESE: Primary | Chronic | ICD-10-CM

## 2024-11-20 DIAGNOSIS — E11.69 HYPERLIPIDEMIA ASSOCIATED WITH TYPE 2 DIABETES MELLITUS: Chronic | ICD-10-CM

## 2024-11-20 DIAGNOSIS — E11.9 HYPERTENSION COMPLICATING DIABETES: Chronic | ICD-10-CM

## 2024-11-20 DIAGNOSIS — R80.9 TYPE 2 DIABETES MELLITUS WITH MICROALBUMINURIA, WITHOUT LONG-TERM CURRENT USE OF INSULIN: Chronic | ICD-10-CM

## 2024-11-20 PROCEDURE — 3066F NEPHROPATHY DOC TX: CPT | Mod: CPTII,95,, | Performed by: FAMILY MEDICINE

## 2024-11-20 PROCEDURE — 99214 OFFICE O/P EST MOD 30 MIN: CPT | Mod: 95,,, | Performed by: FAMILY MEDICINE

## 2024-11-20 PROCEDURE — 4010F ACE/ARB THERAPY RXD/TAKEN: CPT | Mod: CPTII,95,, | Performed by: FAMILY MEDICINE

## 2024-11-20 PROCEDURE — 1160F RVW MEDS BY RX/DR IN RCRD: CPT | Mod: CPTII,95,, | Performed by: FAMILY MEDICINE

## 2024-11-20 PROCEDURE — 1159F MED LIST DOCD IN RCRD: CPT | Mod: CPTII,95,, | Performed by: FAMILY MEDICINE

## 2024-11-20 PROCEDURE — G2211 COMPLEX E/M VISIT ADD ON: HCPCS | Mod: 95,,, | Performed by: FAMILY MEDICINE

## 2024-11-20 PROCEDURE — 3044F HG A1C LEVEL LT 7.0%: CPT | Mod: CPTII,95,, | Performed by: FAMILY MEDICINE

## 2024-11-20 PROCEDURE — 3061F NEG MICROALBUMINURIA REV: CPT | Mod: CPTII,95,, | Performed by: FAMILY MEDICINE

## 2024-11-20 RX ORDER — ATORVASTATIN CALCIUM 20 MG/1
20 TABLET, FILM COATED ORAL NIGHTLY
Qty: 90 TABLET | Refills: 3 | Status: SHIPPED | OUTPATIENT
Start: 2024-11-20 | End: 2025-11-20

## 2024-11-20 RX ORDER — AMLODIPINE AND VALSARTAN 10; 320 MG/1; MG/1
1 TABLET ORAL DAILY
Qty: 90 TABLET | Refills: 2 | Status: SHIPPED | OUTPATIENT
Start: 2024-11-20

## 2024-11-20 NOTE — TELEPHONE ENCOUNTER
----- Message from KAREN Leigh MD sent at 11/20/2024  8:35 AM CST -----  Schedule OV with Jesu in mid-May.  Schedule standing order labs (A1c and fasting lipid panel) a few days before that appointment.

## 2024-11-20 NOTE — ASSESSMENT & PLAN NOTE
"Estimated body mass index is 35.59 kg/m² as calculated from the following:    Height as of 6/27/24: 5' 5" (1.651 m).    Weight as of 6/27/24: 97 kg (213 lb 13.5 oz).   "

## 2024-11-20 NOTE — PROGRESS NOTES
TELEMEDICINE VIRTUAL VIDEO VISIT  11/20/24  8:20 AM CST    Visit Type: Audiovisual    Patient's Location: Adeel represents that they are located within the state [facility].    History of Present Illness    CHIEF COMPLAINT:  Adeel presents today for review of recent test results and discussion of current medications.    RECENT TEST RESULTS:  She reports no concerns regarding recent test results. Urine microalbumin, hemoglobin A1c, and comprehensive metabolic panel were all normal. Lipid panel results were noted to be elevated.    MEDICATIONS:  She is currently taking blood pressure medication, terzepatide, and amlodipine valsartan. She reports not taking prescribed atorvastatin for cholesterol management but acknowledges the need to start due to elevated cholesterol levels. She has been receiving 30-day supplies instead of the usual 90-day supplies for her blood pressure medication. She prefers to keep her current prescriptions at their existing pharmacy, except for atorvastatin, which she requests to be sent to a different location for immediate pickup.    WEIGHT MANAGEMENT:  She reports significant weight loss since starting tirzepatide. She has not weighed herself in a long time and does not own a scale.    FAMILY HISTORY:  She denies family history of thyroid cancer or multiple endocrine neoplasia.       Lab Results   Component Value Date    HGBA1C 4.9 11/14/2024    HGBA1C 4.9 06/27/2024    HGBA1C 5.7 (H) 02/27/2024    EGFRNORACEVR >60.0 11/14/2024    MICALBCREAT 15.8 11/14/2024    LDLCALC 134.4 11/14/2024      Review of Systems   Constitutional:  Negative for fatigue.   Cardiovascular:  Negative for chest pain.   Endocrine: Negative for polydipsia, polyphagia and polyuria.   Integumentary:  Negative for pallor.   Neurological:  Negative for dizziness, tremors, seizures, speech difficulty, weakness and headaches.   Psychiatric/Behavioral:  Negative for confusion. The patient is not nervous/anxious.   "      Assessment & Plan    E78.2 Mixed hyperlipidemia  I15.9 Secondary hypertension, unspecified  E11.21 Type 2 diabetes mellitus with diabetic nephropathy     Noted improvement in urine microalbumin, now normal compared to elevated levels 1 year ago   Hemoglobin A1c now completely normal, indicating good blood sugar control   Comprehensive metabolic panel normal, including kidney and liver function   Lipid panel slightly elevated, necessitating cholesterol medication   Continued current regimen of tirzepatide and antihypertensive medication   Assessed no family history of thyroid cancer or multiple endocrine neoplasia    PATIENT EDUCATION:   Educated on the importance of taking atorvastatin at night    MEDICATIONS:   Started atorvastatin for cholesterol management, to be taken at night   Continued tirzepatide for diabetes management   Continued Amlodipine/valsartan (X-forge) for blood pressure management   Provided 90-day prescriptions for medications    ORDERS:   Labs ordered for 6 months from now    FOLLOW UP:   Follow up for in-office visit   Staff to contact patient to schedule follow-up labs and appointment   Introduced advanced practice partner, Emily Strickland, as additional team member for patient care       1. Obesity complicating type 2 diabetes mellitus  Overview:  No history or family history of thyroid cancer or multiple endocrine neoplasia syndrome.     Assessment & Plan:  Estimated body mass index is 35.59 kg/m² as calculated from the following:    Height as of 6/27/24: 5' 5" (1.651 m).    Weight as of 6/27/24: 97 kg (213 lb 13.5 oz).     Orders:  -     tirzepatide 7.5 mg/0.5 mL PnIj; Inject 7.5 mg into the skin every 7 days.  Dispense: 6 mL; Refill: 1    2. Type 2 diabetes mellitus with microalbuminuria, without long-term current use of insulin  Overview:  Metformin caused GI side-effects.    Orders:  -     amlodipine-valsartan (EXFORGE)  mg per tablet; Take 1 tablet by mouth once daily.  " Dispense: 90 tablet; Refill: 2  -     tirzepatide 7.5 mg/0.5 mL PnIj; Inject 7.5 mg into the skin every 7 days.  Dispense: 6 mL; Refill: 1    3. Hypertension complicating diabetes  -     amlodipine-valsartan (EXFORGE)  mg per tablet; Take 1 tablet by mouth once daily.  Dispense: 90 tablet; Refill: 2    4. Hyperlipidemia complicating type 2 diabetes mellitus  -     atorvastatin (LIPITOR) 20 MG tablet; Take 1 tablet (20 mg total) by mouth every evening. (for cholesterol and heart health)  Dispense: 90 tablet; Refill: 3    No other significant complaints or concerns were reported.  Today's visit involved the intricate management of episodic problem(s) and the ongoing care for the patient's serious or complex condition(s) listed above, reflecting the inherent complexity of providing longitudinal, comprehensive evaluation and management as the central hub for the patient's primary care services.  There were no vitals filed for this visit.  PHYSICAL EXAM:  GENERAL APPEARANCE:  - Alert and grossly oriented.  - No apparent distress, breathing comfortably.     EYES:  - Sclera without icterus.     EARS, NOSE, AND THROAT:  - No visible abnormalities.     RESPIRATORY:  - No respiratory distress.  - No audible wheezing or cough.     PSYCHIATRIC:  - Mood and affect appropriate; behavior cooperative.  This note was generated with the assistance of ambient listening technology. Verbal consent was obtained by the patient and accompanying visitor(s) for the recording of patient appointment to facilitate this note. I attest to having reviewed and edited the generated note for accuracy, though some syntax or spelling errors may persist. Please contact the author of this note for any clarification.      I spent a total of 10 minutes today evaluating and managing this patient for this encounter.  This includes face to face time and non-face to face time preparing to see the patient (eg, review of tests), obtaining and/or reviewing  "separately obtained history, documenting clinical information in the electronic or other health record, independently interpreting results and communicating results to the patient/family/caregiver, or care coordinator.  This time was exclusive of any separately billable procedures for this patient and exclusive of time spent treating any other patient.    Documentation entered by me for this encounter may have been done in part using speech-recognition technology. Although I have made an effort to ensure accuracy, "sound like" errors may exist and should be interpreted in context.    Each patient to whom medical services are provided by telemedicine is: (1) informed of the relationship between the physician and patient and the respective role of any other health care provider with respect to management of the patient; and (2) notified that he or she may decline to receive medical services by telemedicine and may withdraw from such care at any time.    WRAP-UP INSTRUCTIONS  Schedule OV with Jesu in mid-May.  Schedule standing order labs (A1c and fasting lipid panel) a few days before that appointment.    FOR FOLLOW-UP AT NEXT APPOINTMENT  @[place]:  Review lab results. Diabetic foot exam. Assess blood pressure. Refill meds. Schedule six-month follow-up with me, either virtual visit or in-office. Schedule EKG and labs a few days before that appointment. (Labs = Standing Orders: Hemoglobin A1C, Comprehensive Metabolic Panel, Lipid Panel, Urine Microalbumin/Creatinine Ratio).  "

## 2024-11-20 NOTE — Clinical Note
Schedule OV with Jesu in mid-May. Schedule standing order labs (A1c and fasting lipid panel) a few days before that appointment.

## 2024-12-31 ENCOUNTER — OFFICE VISIT (OUTPATIENT)
Dept: OBSTETRICS AND GYNECOLOGY | Facility: CLINIC | Age: 49
End: 2024-12-31
Payer: MEDICAID

## 2024-12-31 VITALS
HEIGHT: 65 IN | SYSTOLIC BLOOD PRESSURE: 116 MMHG | BODY MASS INDEX: 32.76 KG/M2 | DIASTOLIC BLOOD PRESSURE: 82 MMHG | WEIGHT: 196.63 LBS

## 2024-12-31 DIAGNOSIS — Z12.31 ENCOUNTER FOR SCREENING MAMMOGRAM FOR BREAST CANCER: ICD-10-CM

## 2024-12-31 DIAGNOSIS — Z01.419 ENCOUNTER FOR GYNECOLOGICAL EXAMINATION WITHOUT ABNORMAL FINDING: Primary | ICD-10-CM

## 2024-12-31 PROCEDURE — 99213 OFFICE O/P EST LOW 20 MIN: CPT | Mod: PBBFAC | Performed by: NURSE PRACTITIONER

## 2024-12-31 PROCEDURE — 3079F DIAST BP 80-89 MM HG: CPT | Mod: CPTII,,, | Performed by: NURSE PRACTITIONER

## 2024-12-31 PROCEDURE — 3061F NEG MICROALBUMINURIA REV: CPT | Mod: CPTII,,, | Performed by: NURSE PRACTITIONER

## 2024-12-31 PROCEDURE — 3074F SYST BP LT 130 MM HG: CPT | Mod: CPTII,,, | Performed by: NURSE PRACTITIONER

## 2024-12-31 PROCEDURE — 99396 PREV VISIT EST AGE 40-64: CPT | Mod: S$PBB,,, | Performed by: NURSE PRACTITIONER

## 2024-12-31 PROCEDURE — 3008F BODY MASS INDEX DOCD: CPT | Mod: CPTII,,, | Performed by: NURSE PRACTITIONER

## 2024-12-31 PROCEDURE — 4010F ACE/ARB THERAPY RXD/TAKEN: CPT | Mod: CPTII,,, | Performed by: NURSE PRACTITIONER

## 2024-12-31 PROCEDURE — 99999 PR PBB SHADOW E&M-EST. PATIENT-LVL III: CPT | Mod: PBBFAC,,, | Performed by: NURSE PRACTITIONER

## 2024-12-31 PROCEDURE — 3066F NEPHROPATHY DOC TX: CPT | Mod: CPTII,,, | Performed by: NURSE PRACTITIONER

## 2024-12-31 PROCEDURE — 1160F RVW MEDS BY RX/DR IN RCRD: CPT | Mod: CPTII,,, | Performed by: NURSE PRACTITIONER

## 2024-12-31 PROCEDURE — 1159F MED LIST DOCD IN RCRD: CPT | Mod: CPTII,,, | Performed by: NURSE PRACTITIONER

## 2024-12-31 PROCEDURE — 3044F HG A1C LEVEL LT 7.0%: CPT | Mod: CPTII,,, | Performed by: NURSE PRACTITIONER

## 2024-12-31 NOTE — PROGRESS NOTES
CC: Well woman exam    Adeel Gordon is a 49 y.o. female  presents for well woman exam.  LMP: No LMP recorded. Patient has had an ablation..      Health Maintenance Topics with due status: Not Due       Topic Last Completion Date    TETANUS VACCINE 11/10/2016    Cervical Cancer Screening 2022    Colorectal Cancer Screening 2023    Foot Exam 2024    Eye Exam 2024    Diabetes Urine Screening 2024    Lipid Panel 2024    Hemoglobin A1c 2024    Low Dose Statin 2024    RSV Vaccine (Age 60+ and Pregnant patients) Not Due     Past Medical History:   Diagnosis Date    Benign essential hypertension 2014    Class 1 obesity due to excess calories with serious comorbidity and body mass index (BMI) of 34.0 to 34.9 in adult 2015    Class 2 severe obesity due to excess calories with serious comorbidity and body mass index (BMI) of 38.0 to 38.9 in adult 2015    Depression     not taking meds     Diabetes mellitus     Generalized anxiety disorder 2022    History of endometrial ablation 2017    Hypertension     Iron deficiency anemia due to chronic blood loss 2017    Obesity     S/P tubal ligation 2017    Severe obesity with body mass index (BMI) of 36.0 to 36.9 with serious comorbidity 2015    Tobacco use disorder      Past Surgical History:   Procedure Laterality Date    COLONOSCOPY N/A 2023    Procedure: COLONOSCOPY;  Surgeon: Michael Albert MD;  Location: [facility];  Service: Endoscopy;  Laterality: N/A;    ENDOMETRIAL ABLATION  2017    TUBAL LIGATION  2017    VAGINAL DELIVERY      x 3     Social History     Socioeconomic History    Marital status:     Number of children: 3   Occupational History    Occupation:      Employer: [employer]     Comment: [employer]   Tobacco Use    Smoking status: Former     Current packs/day: 0.20     Average packs/day: 0.2  "packs/day for 20.0 years (4.0 ttl pk-yrs)     Types: Vaping with nicotine, Cigarettes     Quit date:     Smokeless tobacco: Never    Tobacco comments:     Vape pen >10 x / day   Substance and Sexual Activity    Alcohol use: No     Alcohol/week: 0.0 standard drinks of alcohol    Drug use: No    Sexual activity: Yes     Partners: Male     Birth control/protection: None     Social Drivers of Health     Financial Resource Strain: Low Risk  (2024)    Overall Financial Resource Strain (CARDIA)     Difficulty of Paying Living Expenses: Not hard at all   Food Insecurity: No Food Insecurity (2024)    Hunger Vital Sign     Worried About Running Out of Food in the Last Year: Never true     Ran Out of Food in the Last Year: Never true   Transportation Needs: No Transportation Needs (2020)    PRAPARE - Transportation     Lack of Transportation (Medical): No     Lack of Transportation (Non-Medical): No   Physical Activity: Insufficiently Active (2024)    Exercise Vital Sign     Days of Exercise per Week: 2 days     Minutes of Exercise per Session: 40 min   Stress: No Stress Concern Present (2024)    [employer] - Occupational Stress Questionnaire     Feeling of Stress : Not at all   Housing Stability: Unknown (2024)    Housing Stability Vital Sign     Unable to Pay for Housing in the Last Year: No     Family History   Problem Relation Name Age of Onset    No Known Problems Sister      No Known Problems Sister      No Known Problems Brother      Kidney disease Son      Hypertension Son      Cancer Father          THROAT    Hypertension Father      Hypertension Mother       OB History          3    Para   3    Term   3            AB        Living   3         SAB        IAB        Ectopic        Multiple        Live Births   3                 /82 (BP Location: Left arm, Patient Position: Sitting)   Ht 5' 5" (1.651 m)   Wt 89.2 kg (196 lb 10.4 oz)   " BMI 32.72 kg/m²       ROS:  Per hpi    PHYSICAL EXAM:  APPEARANCE: Well nourished, well developed, in no acute distress.  AFFECT: WNL, alert and oriented x 3  SKIN: No acne or hirsutism  NECK: Neck symmetric without masses or thyromegaly  NODES: No inguinal, cervical, axillary, or femoral lymph node enlargement  CHEST: Good respiratory effect  ABDOMEN: Soft.  No tenderness or masses.  No hepatosplenomegaly.  No hernias.  BREASTS: Symmetrical, no skin changes or visible lesions.  No palpable masses, nipple discharge bilaterally.  PELVIC: Normal external genitalia without lesions.  Normal hair distribution.  Adequate perineal body, normal urethral meatus.  Vagina moist and well rugated without lesions or discharge.  Cervix pink, without lesions, discharge or tenderness.  No significant cystocele or rectocele.  Bimanual exam shows uterus to be normal size, regular, mobile and nontender.  Adnexa without masses or tenderness.    EXTREMITIES: No edema.  Physical Exam    1. Encounter for gynecological examination without abnormal finding  Liquid-Based Pap Smear, Screening    HPV High Risk Genotypes, PCR      2. Encounter for screening mammogram for breast cancer  Mammo Digital Screening Bilat w/ Donla       AND PLAN:    Adeel was seen today for well woman.    Diagnoses and all orders for this visit:    Encounter for gynecological examination without abnormal finding  -     Liquid-Based Pap Smear, Screening  -     HPV High Risk Genotypes, PCR    Encounter for screening mammogram for breast cancer  -     Mammo Digital Screening Bilat w/ Donal; Future         Patient was counseled today on A.C.S. Pap guidelines and recommendations for yearly pelvic exams, mammograms and monthly self breast exams; to see her PCP for other health maintenance.

## 2025-01-30 ENCOUNTER — TELEPHONE (OUTPATIENT)
Dept: INTERNAL MEDICINE | Facility: CLINIC | Age: 50
End: 2025-01-30
Payer: MEDICAID

## 2025-01-31 ENCOUNTER — OFFICE VISIT (OUTPATIENT)
Dept: INTERNAL MEDICINE | Facility: CLINIC | Age: 50
End: 2025-01-31
Payer: MEDICAID

## 2025-01-31 VITALS
TEMPERATURE: 97 F | OXYGEN SATURATION: 97 % | SYSTOLIC BLOOD PRESSURE: 120 MMHG | HEIGHT: 65 IN | HEART RATE: 96 BPM | WEIGHT: 193.31 LBS | BODY MASS INDEX: 32.21 KG/M2 | DIASTOLIC BLOOD PRESSURE: 88 MMHG

## 2025-01-31 DIAGNOSIS — N95.1 PERIMENOPAUSAL VASOMOTOR SYMPTOMS: Primary | ICD-10-CM

## 2025-01-31 DIAGNOSIS — N91.2 AMENORRHEA: ICD-10-CM

## 2025-01-31 DIAGNOSIS — D22.9 ATYPICAL MOLE: ICD-10-CM

## 2025-01-31 PROCEDURE — 87070 CULTURE OTHR SPECIMN AEROBIC: CPT | Performed by: NURSE PRACTITIONER

## 2025-01-31 PROCEDURE — 87186 SC STD MICRODIL/AGAR DIL: CPT | Performed by: NURSE PRACTITIONER

## 2025-01-31 PROCEDURE — 87077 CULTURE AEROBIC IDENTIFY: CPT | Performed by: NURSE PRACTITIONER

## 2025-01-31 PROCEDURE — 99215 OFFICE O/P EST HI 40 MIN: CPT | Mod: PBBFAC | Performed by: NURSE PRACTITIONER

## 2025-01-31 RX ORDER — MUPIROCIN 20 MG/G
OINTMENT TOPICAL 3 TIMES DAILY
Qty: 22 G | Refills: 0 | Status: SHIPPED | OUTPATIENT
Start: 2025-01-31

## 2025-01-31 NOTE — PROGRESS NOTES
Subjective:      Patient ID: Adeel Gordon is a 49 y.o. female.    Chief Complaint: Physical Exam    History of Present Illness    CHIEF COMPLAINT:  Adeel presents today for evaluation of a concerning mole    SKIN CONCERNS:  She reports a long-standing mole that has recently undergone changes. The mole was previously black but has changed to brown in color, with development of bleeding and purulent drainage, followed by skin sloughing. She also notes multiple moles present throughout her body, including under the arms.    MENOPAUSE SYMPTOMS:  She reports experiencing hot flashes for approximately one month, characterized by sudden intense heat requiring removal of clothing and use of fan, followed by feeling extremely cold requiring heat. These temperature fluctuations occur rapidly without significant sweating.    MENSTRUAL HISTORY:  Her last menstrual period was 2-2.5 months ago. She reports irregular menstrual cycles with periods occurring inconsistently, sometimes skipping months.      ROS:  General: -fever, -chills, -fatigue, -weight gain, -weight loss  Eyes: -vision changes, -redness, -discharge  ENT: -ear pain, -nasal congestion, -sore throat  Cardiovascular: -chest pain, -palpitations, -lower extremity edema  Respiratory: -cough, -shortness of breath  Gastrointestinal: -abdominal pain, -nausea, -vomiting, -diarrhea, -constipation, -blood in stool  Genitourinary: -dysuria, -hematuria, -frequency  Musculoskeletal: -joint pain, -muscle pain  Skin: -rash, +lesion  Neurological: -headache, -dizziness, -numbness, -tingling  Psychiatric: -anxiety, -depression, -sleep difficulty  Endocrine: -excessive sweating          Patient Active Problem List   Diagnosis    Hypertension complicating diabetes    Obesity complicating type 2 diabetes mellitus    Gastro-esophageal reflux disease without esophagitis    S/P tubal ligation    History of endometrial ablation    At increased risk for Influenza and Pneumococcal  "infections due to unvaccinated state AMA    Generalized anxiety disorder with panic attacks    Mood disorder with anxiety    Type 2 diabetes mellitus with microalbuminuria, without long-term current use of insulin    Hyperlipidemia complicating type 2 diabetes mellitus    Pain in both feet    Chronic foot pain    Other constipation         Current Outpatient Medications:     amlodipine-valsartan (EXFORGE)  mg per tablet, Take 1 tablet by mouth once daily., Disp: 90 tablet, Rfl: 2    atorvastatin (LIPITOR) 20 MG tablet, Take 1 tablet (20 mg total) by mouth every evening. (for cholesterol and heart health), Disp: 90 tablet, Rfl: 3    blood sugar diagnostic (BLOOD GLUCOSE TEST) Strp, Check blood glucose 1-2 times daily as directed and as needed for symptoms of low or high blood sugar (Patient not taking: Reported on 1/31/2025), Disp: 100 strip, Rfl: 11    blood-glucose meter kit, Use as instructed (Patient not taking: Reported on 1/31/2025), Disp: 1 each, Rfl: 0    lancets Misc, Check blood glucose 1-2 times daily as directed and as needed for symptoms of low or high blood sugar (Patient not taking: Reported on 1/31/2025), Disp: 100 each, Rfl: 11    mupirocin (BACTROBAN) 2 % ointment, Apply topically 3 (three) times daily., Disp: 22 g, Rfl: 0    tirzepatide 7.5 mg/0.5 mL PnIj, Inject 7.5 mg into the skin every 7 days. (Patient not taking: Reported on 1/31/2025), Disp: 6 mL, Rfl: 1      Objective:   /88 (BP Location: Left arm, Patient Position: Sitting)   Pulse 96   Temp 97.3 °F (36.3 °C) (Tympanic)   Ht 5' 5" (1.651 m)   Wt 87.7 kg (193 lb 5.5 oz)   SpO2 97%   BMI 32.17 kg/m²     Physical Exam             Physical Exam  Vitals and nursing note reviewed.   Constitutional:       General: She is awake. She is not in acute distress.     Appearance: Normal appearance. She is well-developed and well-groomed. She is not ill-appearing, toxic-appearing or diaphoretic.   HENT:      Head: Normocephalic and " atraumatic.      Right Ear: External ear normal.      Left Ear: External ear normal.   Eyes:      Pupils: Pupils are equal, round, and reactive to light.   Cardiovascular:      Rate and Rhythm: Normal rate and regular rhythm.      Heart sounds: Normal heart sounds. No murmur heard.  Pulmonary:      Effort: Pulmonary effort is normal. No tachypnea, bradypnea, accessory muscle usage, prolonged expiration, respiratory distress or retractions.      Breath sounds: Normal breath sounds. No stridor, decreased air movement or transmitted upper airway sounds. No decreased breath sounds, wheezing, rhonchi or rales.   Abdominal:      General: Abdomen is flat. Bowel sounds are normal.      Palpations: Abdomen is soft.   Musculoskeletal:         General: No swelling, tenderness, deformity or signs of injury.      Cervical back: Normal range of motion and neck supple.      Right lower leg: No edema.      Left lower leg: No edema.   Skin:     General: Skin is warm and dry.      Capillary Refill: Capillary refill takes less than 2 seconds.      Findings: Lesion present.      Comments: See attached pic   Neurological:      General: No focal deficit present.      Mental Status: She is alert and oriented to person, place, and time.      Gait: Gait normal.   Psychiatric:         Attention and Perception: Attention normal.         Mood and Affect: Mood normal.         Speech: Speech normal.         Behavior: Behavior normal. Behavior is cooperative.          Assessment/Plan :   1. Perimenopausal vasomotor symptoms    2. Amenorrhea    3. Atypical mole  -     Ambulatory referral/consult to Dermatology; Future; Expected date: 02/07/2025  -     CULTURE, AEROBIC  (SPECIFY SOURCE)  -     mupirocin (BACTROBAN) 2 % ointment; Apply topically 3 (three) times daily.  Dispense: 22 g; Refill: 0         Assessment & Plan    Assessed patient for perimenopausal symptoms, including hot flashes  Considered patient's age, menstrual history, and current  symptoms to determine perimenopausal state  Evaluated skin lesion on patient's side, noting drainage and color changes  Will culture the lesion to rule out infection    PERIMENOPAUSE:  - Assessed the patient's perimenopausal symptoms, including hot flashes experienced for about a month with sudden temperature changes from hot to cold.  - Noted irregular menstrual cycles, with the last period occurring 2-2.5 months ago.  - Determined that the patient is not yet in menopause, as she has not gone a full year without a menstrual cycle.  - Explained the definition of menopause as 1 year without menstrual cycles.  - Discussed the importance of water intake in managing perimenopausal symptoms, particularly hot flashes.  - Recommend increasing water intake to 85 ounces per day.  - Suggested OTC options for hot flashes: Black cohosh and Evening primrose.  - Glennetta to drink 85 ounces (4 20-ounce bottles) of water daily.    SKIN LESION:  - Evaluated multiple moles on the patient's body, with particular concern for a changed mole on her side.  - Visually examined the mole, noting its appearance and drainage.  - Ordered a culture of the skin lesion.  - Took a picture of the mole for documentation.  - Instructed the patient to clean the skin lesion with antibacterial soap, pat dry, apply ointment, and cover with a bandaid 3 times daily.  - Prescribed piercing ointment: Apply to skin lesion 3 times daily after cleaning.  - Advised the patient to keep the mole covered while draining.    DERMATOLOGY REFERRAL:  - Referred the patient to dermatology for evaluation of multiple moles.  - Scheduled an in-person dermatology appointment for April 16th at 7:45 AM.    OTHER INSTRUCTIONS:  - Informed patient about the role of water in kidney health.    FOLLOW UP:  - Follow up as scheduled by .          No follow-ups on file.    This note was generated with the assistance of ambient listening technology. Verbal consent was  obtained by the patient and accompanying visitor(s) for the recording of patient appointment to facilitate this note. I attest to having reviewed and edited the generated note for accuracy, though some syntax or spelling errors may persist. Please contact the author of this note for any clarification.

## 2025-01-31 NOTE — PATIENT INSTRUCTIONS
You may take OTC Black Cohosh or Evening Primrose for your hot flashes with drinking 85 ounces of water daily

## 2025-02-03 LAB — BACTERIA SPEC AEROBE CULT: ABNORMAL

## 2025-02-05 ENCOUNTER — PATIENT MESSAGE (OUTPATIENT)
Dept: INTERNAL MEDICINE | Facility: CLINIC | Age: 50
End: 2025-02-05
Payer: MEDICAID

## 2025-02-05 DIAGNOSIS — A49.01 STAPH AUREUS INFECTION: Primary | ICD-10-CM

## 2025-02-05 RX ORDER — SULFAMETHOXAZOLE AND TRIMETHOPRIM 800; 160 MG/1; MG/1
1 TABLET ORAL 2 TIMES DAILY
Qty: 14 TABLET | Refills: 0 | Status: SHIPPED | OUTPATIENT
Start: 2025-02-05 | End: 2025-02-12

## 2025-02-12 ENCOUNTER — HOSPITAL ENCOUNTER (OUTPATIENT)
Dept: RADIOLOGY | Facility: HOSPITAL | Age: 50
Discharge: HOME | End: 2025-02-12
Attending: NURSE PRACTITIONER
Payer: MEDICAID

## 2025-02-12 VITALS — WEIGHT: 193.31 LBS | HEIGHT: 65 IN | BODY MASS INDEX: 32.21 KG/M2

## 2025-02-12 DIAGNOSIS — Z12.31 ENCOUNTER FOR SCREENING MAMMOGRAM FOR BREAST CANCER: ICD-10-CM

## 2025-02-12 PROCEDURE — 77067 SCR MAMMO BI INCL CAD: CPT | Mod: TC

## 2025-02-12 PROCEDURE — 77063 BREAST TOMOSYNTHESIS BI: CPT | Mod: 26,,, | Performed by: STUDENT IN AN ORGANIZED HEALTH CARE EDUCATION/TRAINING PROGRAM

## 2025-02-12 PROCEDURE — 77067 SCR MAMMO BI INCL CAD: CPT | Mod: 26,,, | Performed by: STUDENT IN AN ORGANIZED HEALTH CARE EDUCATION/TRAINING PROGRAM

## 2025-05-09 ENCOUNTER — TELEPHONE (OUTPATIENT)
Dept: INTERNAL MEDICINE | Facility: CLINIC | Age: 50
End: 2025-05-09
Payer: MEDICAID

## 2025-05-14 ENCOUNTER — LAB VISIT (OUTPATIENT)
Dept: LAB | Facility: HOSPITAL | Age: 50
End: 2025-05-14
Attending: FAMILY MEDICINE
Payer: MEDICAID

## 2025-05-14 DIAGNOSIS — E78.5 HYPERLIPIDEMIA ASSOCIATED WITH TYPE 2 DIABETES MELLITUS: Chronic | ICD-10-CM

## 2025-05-14 DIAGNOSIS — I10 HYPERTENSION COMPLICATING DIABETES: Chronic | ICD-10-CM

## 2025-05-14 DIAGNOSIS — E11.9 HYPERTENSION COMPLICATING DIABETES: Chronic | ICD-10-CM

## 2025-05-14 DIAGNOSIS — R80.9 TYPE 2 DIABETES MELLITUS WITH MICROALBUMINURIA, WITHOUT LONG-TERM CURRENT USE OF INSULIN: Chronic | ICD-10-CM

## 2025-05-14 DIAGNOSIS — E11.69 HYPERLIPIDEMIA ASSOCIATED WITH TYPE 2 DIABETES MELLITUS: Chronic | ICD-10-CM

## 2025-05-14 DIAGNOSIS — E11.29 TYPE 2 DIABETES MELLITUS WITH MICROALBUMINURIA, WITHOUT LONG-TERM CURRENT USE OF INSULIN: Chronic | ICD-10-CM

## 2025-05-14 LAB
ALBUMIN SERPL BCP-MCNC: 3.7 G/DL (ref 3.5–5.2)
ALBUMIN/CREAT UR: 10.1 UG/MG
ALP SERPL-CCNC: 64 UNIT/L (ref 40–150)
ALT SERPL W/O P-5'-P-CCNC: 12 UNIT/L (ref 10–44)
ANION GAP (OHS): 9 MMOL/L (ref 8–16)
AST SERPL-CCNC: 12 UNIT/L (ref 11–45)
BILIRUB SERPL-MCNC: 0.6 MG/DL (ref 0.1–1)
BUN SERPL-MCNC: 12 MG/DL (ref 6–20)
CALCIUM SERPL-MCNC: 9.8 MG/DL (ref 8.7–10.5)
CHLORIDE SERPL-SCNC: 110 MMOL/L (ref 95–110)
CHOLEST SERPL-MCNC: 185 MG/DL (ref 120–199)
CHOLEST/HDLC SERPL: 4 {RATIO} (ref 2–5)
CO2 SERPL-SCNC: 25 MMOL/L (ref 23–29)
CREAT SERPL-MCNC: 0.8 MG/DL (ref 0.5–1.4)
CREAT UR-MCNC: 169 MG/DL (ref 15–325)
EAG (OHS): 97 MG/DL (ref 68–131)
GFR SERPLBLD CREATININE-BSD FMLA CKD-EPI: >60 ML/MIN/1.73/M2
GLUCOSE SERPL-MCNC: 80 MG/DL (ref 70–110)
HBA1C MFR BLD: 5 % (ref 4–5.6)
HDLC SERPL-MCNC: 46 MG/DL (ref 40–75)
HDLC SERPL: 24.9 % (ref 20–50)
LDLC SERPL CALC-MCNC: 111.2 MG/DL (ref 63–159)
MICROALBUMIN UR-MCNC: 17 UG/ML (ref ?–5000)
NONHDLC SERPL-MCNC: 139 MG/DL
POTASSIUM SERPL-SCNC: 4.1 MMOL/L (ref 3.5–5.1)
PROT SERPL-MCNC: 6.8 GM/DL (ref 6–8.4)
SODIUM SERPL-SCNC: 144 MMOL/L (ref 136–145)
TRIGL SERPL-MCNC: 139 MG/DL (ref 30–150)

## 2025-05-14 PROCEDURE — 36415 COLL VENOUS BLD VENIPUNCTURE: CPT

## 2025-05-14 PROCEDURE — 82043 UR ALBUMIN QUANTITATIVE: CPT

## 2025-05-14 PROCEDURE — 80053 COMPREHEN METABOLIC PANEL: CPT

## 2025-05-14 PROCEDURE — 80061 LIPID PANEL: CPT

## 2025-05-14 PROCEDURE — 83036 HEMOGLOBIN GLYCOSYLATED A1C: CPT

## 2025-05-17 ENCOUNTER — RESULTS FOLLOW-UP (OUTPATIENT)
Dept: INTERNAL MEDICINE | Facility: CLINIC | Age: 50
End: 2025-05-17

## 2025-08-04 ENCOUNTER — PATIENT MESSAGE (OUTPATIENT)
Dept: DERMATOLOGY | Facility: CLINIC | Age: 50
End: 2025-08-04
Payer: MEDICAID

## 2025-08-06 ENCOUNTER — TELEPHONE (OUTPATIENT)
Dept: INTERNAL MEDICINE | Facility: CLINIC | Age: 50
End: 2025-08-06
Payer: MEDICAID

## 2025-08-06 ENCOUNTER — TELEPHONE (OUTPATIENT)
Dept: DERMATOLOGY | Facility: CLINIC | Age: 50
End: 2025-08-06
Payer: MEDICAID

## 2025-08-06 NOTE — TELEPHONE ENCOUNTER
Copied from CRM #[id number]. Topic: Appointments - Appointment Access  >> Aug 6, 2025 11:34 AM Sumaya wrote:  Type:  Sooner Apoointment Request    Caller is requesting a sooner appointment.  Caller declined first available appointment listed below.  Caller will not accept being placed on the waitlist and is requesting a message be sent to doctor.  Name of Caller: pt  When is the first available appointment? No solutions  Symptoms: back pain  Would the patient rather a call back or a response via [facility]? phone  Best Call Back Number:[phone]   Additional Information:

## 2025-08-06 NOTE — TELEPHONE ENCOUNTER
Copied from CRM #[id number]. Topic: Appointments - Appointment Access  >> Aug 6, 2025 11:32 AM Sumaya wrote:  Type:  Sooner Apoointment Request    Caller is requesting a sooner appointment.  Caller declined first available appointment listed below.  Caller will not accept being placed on the waitlist and is requesting a message be sent to doctor.  Name of Caller: pt  When is the first available appointment? Pt's appt was cancelled by office, please cristobal  Symptoms: moles  Would the patient rather a call back or a response via [facility]? phone  Best Call Back Number:[phone]   Additional Information:

## 2025-08-11 ENCOUNTER — HOSPITAL ENCOUNTER (OUTPATIENT)
Dept: RADIOLOGY | Facility: HOSPITAL | Age: 50
Discharge: HOME | End: 2025-08-11
Attending: PHYSICIAN ASSISTANT
Payer: MEDICAID

## 2025-08-11 ENCOUNTER — OFFICE VISIT (OUTPATIENT)
Dept: INTERNAL MEDICINE | Facility: CLINIC | Age: 50
End: 2025-08-11
Payer: MEDICAID

## 2025-08-11 VITALS
HEIGHT: 65 IN | WEIGHT: 195.56 LBS | SYSTOLIC BLOOD PRESSURE: 132 MMHG | TEMPERATURE: 98 F | HEART RATE: 81 BPM | OXYGEN SATURATION: 97 % | BODY MASS INDEX: 32.58 KG/M2 | DIASTOLIC BLOOD PRESSURE: 80 MMHG

## 2025-08-11 DIAGNOSIS — E11.29 TYPE 2 DIABETES MELLITUS WITH MICROALBUMINURIA, WITHOUT LONG-TERM CURRENT USE OF INSULIN: Chronic | ICD-10-CM

## 2025-08-11 DIAGNOSIS — M54.50 ACUTE LEFT-SIDED LOW BACK PAIN WITHOUT SCIATICA: ICD-10-CM

## 2025-08-11 DIAGNOSIS — M54.50 ACUTE LEFT-SIDED LOW BACK PAIN WITHOUT SCIATICA: Primary | ICD-10-CM

## 2025-08-11 DIAGNOSIS — E78.5 HYPERLIPIDEMIA ASSOCIATED WITH TYPE 2 DIABETES MELLITUS: Chronic | ICD-10-CM

## 2025-08-11 DIAGNOSIS — M54.9 MID BACK PAIN ON LEFT SIDE: ICD-10-CM

## 2025-08-11 DIAGNOSIS — E11.69 HYPERLIPIDEMIA ASSOCIATED WITH TYPE 2 DIABETES MELLITUS: Chronic | ICD-10-CM

## 2025-08-11 DIAGNOSIS — I10 HYPERTENSION COMPLICATING DIABETES: Chronic | ICD-10-CM

## 2025-08-11 DIAGNOSIS — E11.9 HYPERTENSION COMPLICATING DIABETES: Chronic | ICD-10-CM

## 2025-08-11 DIAGNOSIS — R80.9 TYPE 2 DIABETES MELLITUS WITH MICROALBUMINURIA, WITHOUT LONG-TERM CURRENT USE OF INSULIN: Chronic | ICD-10-CM

## 2025-08-11 PROCEDURE — 99999 PR PBB SHADOW E&M-EST. PATIENT-LVL III: CPT | Mod: PBBFAC,,, | Performed by: PHYSICIAN ASSISTANT

## 2025-08-11 PROCEDURE — 72100 X-RAY EXAM L-S SPINE 2/3 VWS: CPT | Mod: 26,,, | Performed by: RADIOLOGY

## 2025-08-11 PROCEDURE — 3066F NEPHROPATHY DOC TX: CPT | Mod: CPTII,,, | Performed by: PHYSICIAN ASSISTANT

## 2025-08-11 PROCEDURE — 3075F SYST BP GE 130 - 139MM HG: CPT | Mod: CPTII,,, | Performed by: PHYSICIAN ASSISTANT

## 2025-08-11 PROCEDURE — 3061F NEG MICROALBUMINURIA REV: CPT | Mod: CPTII,,, | Performed by: PHYSICIAN ASSISTANT

## 2025-08-11 PROCEDURE — 72070 X-RAY EXAM THORAC SPINE 2VWS: CPT | Mod: 26,,, | Performed by: RADIOLOGY

## 2025-08-11 PROCEDURE — 99213 OFFICE O/P EST LOW 20 MIN: CPT | Mod: PBBFAC,25 | Performed by: PHYSICIAN ASSISTANT

## 2025-08-11 PROCEDURE — 3079F DIAST BP 80-89 MM HG: CPT | Mod: CPTII,,, | Performed by: PHYSICIAN ASSISTANT

## 2025-08-11 PROCEDURE — 4010F ACE/ARB THERAPY RXD/TAKEN: CPT | Mod: CPTII,,, | Performed by: PHYSICIAN ASSISTANT

## 2025-08-11 PROCEDURE — 3008F BODY MASS INDEX DOCD: CPT | Mod: CPTII,,, | Performed by: PHYSICIAN ASSISTANT

## 2025-08-11 PROCEDURE — 1159F MED LIST DOCD IN RCRD: CPT | Mod: CPTII,,, | Performed by: PHYSICIAN ASSISTANT

## 2025-08-11 PROCEDURE — 3044F HG A1C LEVEL LT 7.0%: CPT | Mod: CPTII,,, | Performed by: PHYSICIAN ASSISTANT

## 2025-08-11 PROCEDURE — 72100 X-RAY EXAM L-S SPINE 2/3 VWS: CPT | Mod: TC

## 2025-08-11 PROCEDURE — 72070 X-RAY EXAM THORAC SPINE 2VWS: CPT | Mod: TC

## 2025-08-11 PROCEDURE — 99214 OFFICE O/P EST MOD 30 MIN: CPT | Mod: S$PBB,,, | Performed by: PHYSICIAN ASSISTANT

## 2025-08-11 PROCEDURE — 3072F LOW RISK FOR RETINOPATHY: CPT | Mod: CPTII,,, | Performed by: PHYSICIAN ASSISTANT

## 2025-08-11 RX ORDER — DICLOFENAC SODIUM 50 MG/1
50 TABLET, DELAYED RELEASE ORAL 2 TIMES DAILY
Qty: 28 TABLET | Refills: 0 | Status: SHIPPED | OUTPATIENT
Start: 2025-08-11 | End: 2025-08-11 | Stop reason: SDUPTHER

## 2025-08-11 RX ORDER — ATORVASTATIN CALCIUM 20 MG/1
20 TABLET, FILM COATED ORAL NIGHTLY
Qty: 90 TABLET | Refills: 3 | Status: SHIPPED | OUTPATIENT
Start: 2025-08-11 | End: 2026-08-11

## 2025-08-11 RX ORDER — DICLOFENAC SODIUM 50 MG/1
50 TABLET, DELAYED RELEASE ORAL 2 TIMES DAILY
Qty: 28 TABLET | Refills: 0 | Status: SHIPPED | OUTPATIENT
Start: 2025-08-11 | End: 2025-08-25

## 2025-08-11 RX ORDER — AMLODIPINE AND VALSARTAN 10; 320 MG/1; MG/1
1 TABLET ORAL DAILY
Qty: 90 TABLET | Refills: 1 | Status: SHIPPED | OUTPATIENT
Start: 2025-08-11

## 2025-08-11 RX ORDER — TIZANIDINE 4 MG/1
4 TABLET ORAL EVERY 8 HOURS
Qty: 30 TABLET | Refills: 0 | Status: SHIPPED | OUTPATIENT
Start: 2025-08-11 | End: 2025-08-21

## 2025-08-11 RX ORDER — AMLODIPINE AND VALSARTAN 10; 320 MG/1; MG/1
1 TABLET ORAL DAILY
Qty: 90 TABLET | Refills: 1 | Status: SHIPPED | OUTPATIENT
Start: 2025-08-11 | End: 2025-08-11 | Stop reason: SDUPTHER

## 2025-08-11 RX ORDER — ATORVASTATIN CALCIUM 20 MG/1
20 TABLET, FILM COATED ORAL NIGHTLY
Qty: 90 TABLET | Refills: 3 | Status: SHIPPED | OUTPATIENT
Start: 2025-08-11 | End: 2025-08-11 | Stop reason: SDUPTHER

## 2025-08-11 RX ORDER — TIZANIDINE 4 MG/1
4 TABLET ORAL EVERY 8 HOURS
Qty: 30 TABLET | Refills: 0 | Status: SHIPPED | OUTPATIENT
Start: 2025-08-11 | End: 2025-08-11 | Stop reason: SDUPTHER

## 2025-08-11 RX ORDER — TIRZEPATIDE 7.5 MG/.5ML
7.5 INJECTION, SOLUTION SUBCUTANEOUS
Qty: 6 ML | Refills: 1 | Status: SHIPPED | OUTPATIENT
Start: 2025-08-11 | End: 2025-08-11

## 2025-08-11 RX ORDER — TIRZEPATIDE 7.5 MG/.5ML
7.5 INJECTION, SOLUTION SUBCUTANEOUS
COMMUNITY
End: 2025-08-11 | Stop reason: SDUPTHER

## (undated) DEVICE — SOL 9P NACL IRR PIC IL

## (undated) DEVICE — GLOVE SURGICAL LATEX SZ 6

## (undated) DEVICE — TUBING HEATED INSUFFLATOR

## (undated) DEVICE — SEE MEDLINE ITEM 157181

## (undated) DEVICE — GLOVE SURGICAL LATEX SZ 6.5

## (undated) DEVICE — SEE MEDLINE ITEM 154981

## (undated) DEVICE — ELECTRODE REM PLYHSV RETURN 9

## (undated) DEVICE — SYR 3CC LUER LOC

## (undated) DEVICE — KIT DEV NOVASURE ENDOMETRIAL.

## (undated) DEVICE — COVER OVERHEAD SURG LT BLUE

## (undated) DEVICE — TROCAR ENDOPATH XCEL 8MM 10CM

## (undated) DEVICE — SEE MEDLINE ITEM 157027

## (undated) DEVICE — APPLICATOR CHLORAPREP ORN 26ML

## (undated) DEVICE — KIT ANTIFOG

## (undated) DEVICE — GLOVE SURG BIOGEL LATEX SZ 7.5

## (undated) DEVICE — ADHESIVE DERMABOND ADVANCED

## (undated) DEVICE — SYR 10CC LUER LOCK

## (undated) DEVICE — SUT CTD VICRYL PLUS 4/0

## (undated) DEVICE — CANNULA ENDOPATH XCEL 5X100MM

## (undated) DEVICE — DRAPE LAVH LAPAROSCOPY W/FLUID

## (undated) DEVICE — SET CYSTO IRRIGATION UNIV SPIK

## (undated) DEVICE — NDL PNEUMO INSUFFLATI 120MM

## (undated) DEVICE — MANIFOLD 4 PORT

## (undated) DEVICE — SEE MEDLINE ITEM 152622

## (undated) DEVICE — GLOVE SURGICAL LATEX SZ 7

## (undated) DEVICE — SEE MEDLINE ITEM 146292

## (undated) DEVICE — SEE MEDLINE ITEM 157117

## (undated) DEVICE — CATH URETHRAL 18FR

## (undated) DEVICE — PACK DRAPE PERI/GYN TIBURON

## (undated) DEVICE — CATH 16FR URETHRL RED RUB

## (undated) DEVICE — TROCAR ENDOPATH XCEL 5X100MM

## (undated) DEVICE — PAD PERI POST REPLACEMNT